# Patient Record
Sex: FEMALE | Race: OTHER | HISPANIC OR LATINO
[De-identification: names, ages, dates, MRNs, and addresses within clinical notes are randomized per-mention and may not be internally consistent; named-entity substitution may affect disease eponyms.]

---

## 2017-08-29 ENCOUNTER — LABORATORY RESULT (OUTPATIENT)
Age: 32
End: 2017-08-29

## 2017-08-30 ENCOUNTER — APPOINTMENT (OUTPATIENT)
Dept: INTERNAL MEDICINE | Facility: CLINIC | Age: 32
End: 2017-08-30
Payer: COMMERCIAL

## 2017-08-30 ENCOUNTER — OUTPATIENT (OUTPATIENT)
Dept: OUTPATIENT SERVICES | Facility: HOSPITAL | Age: 32
LOS: 1 days | End: 2017-08-30
Payer: SELF-PAY

## 2017-08-30 VITALS
HEIGHT: 61.5 IN | HEART RATE: 76 BPM | SYSTOLIC BLOOD PRESSURE: 110 MMHG | WEIGHT: 171 LBS | OXYGEN SATURATION: 98 % | DIASTOLIC BLOOD PRESSURE: 70 MMHG | BODY MASS INDEX: 31.87 KG/M2

## 2017-08-30 DIAGNOSIS — R63.5 ABNORMAL WEIGHT GAIN: ICD-10-CM

## 2017-08-30 DIAGNOSIS — I10 ESSENTIAL (PRIMARY) HYPERTENSION: ICD-10-CM

## 2017-08-30 PROCEDURE — 80053 COMPREHEN METABOLIC PANEL: CPT

## 2017-08-30 PROCEDURE — 84443 ASSAY THYROID STIM HORMONE: CPT

## 2017-08-30 PROCEDURE — 80061 LIPID PANEL: CPT

## 2017-08-30 PROCEDURE — 99203 OFFICE O/P NEW LOW 30 MIN: CPT | Mod: GC

## 2017-08-30 PROCEDURE — G0463: CPT

## 2017-08-30 PROCEDURE — 83036 HEMOGLOBIN GLYCOSYLATED A1C: CPT

## 2017-08-30 PROCEDURE — 87389 HIV-1 AG W/HIV-1&-2 AB AG IA: CPT

## 2017-08-31 LAB
ALBUMIN SERPL ELPH-MCNC: 4.6 G/DL — SIGNIFICANT CHANGE UP (ref 3.3–5)
ALP SERPL-CCNC: 76 U/L — SIGNIFICANT CHANGE UP (ref 40–120)
ALT FLD-CCNC: 43 U/L — SIGNIFICANT CHANGE UP (ref 10–45)
ANION GAP SERPL CALC-SCNC: 14 MMOL/L — SIGNIFICANT CHANGE UP (ref 5–17)
AST SERPL-CCNC: 33 U/L — SIGNIFICANT CHANGE UP (ref 10–40)
BILIRUB SERPL-MCNC: 0.4 MG/DL — SIGNIFICANT CHANGE UP (ref 0.2–1.2)
BUN SERPL-MCNC: 11 MG/DL — SIGNIFICANT CHANGE UP (ref 7–23)
C TRACH RRNA SPEC QL NAA+PROBE: SIGNIFICANT CHANGE UP
CALCIUM SERPL-MCNC: 9.8 MG/DL — SIGNIFICANT CHANGE UP (ref 8.4–10.5)
CHLORIDE SERPL-SCNC: 103 MMOL/L — SIGNIFICANT CHANGE UP (ref 96–108)
CHOLEST SERPL-MCNC: 201 MG/DL — HIGH (ref 10–199)
CO2 SERPL-SCNC: 24 MMOL/L — SIGNIFICANT CHANGE UP (ref 22–31)
CREAT SERPL-MCNC: 0.72 MG/DL — SIGNIFICANT CHANGE UP (ref 0.5–1.3)
GLUCOSE SERPL-MCNC: 93 MG/DL — SIGNIFICANT CHANGE UP (ref 70–99)
HBA1C BLD-MCNC: 5.3 % — SIGNIFICANT CHANGE UP (ref 4–5.6)
HDLC SERPL-MCNC: 56 MG/DL — SIGNIFICANT CHANGE UP (ref 40–125)
HIV 1+2 AB+HIV1 P24 AG SERPL QL IA: SIGNIFICANT CHANGE UP
LIPID PNL WITH DIRECT LDL SERPL: 112 MG/DL — SIGNIFICANT CHANGE UP
N GONORRHOEA RRNA SPEC QL NAA+PROBE: SIGNIFICANT CHANGE UP
POTASSIUM SERPL-MCNC: 3.8 MMOL/L — SIGNIFICANT CHANGE UP (ref 3.5–5.3)
POTASSIUM SERPL-SCNC: 3.8 MMOL/L — SIGNIFICANT CHANGE UP (ref 3.5–5.3)
PROT SERPL-MCNC: 7.5 G/DL — SIGNIFICANT CHANGE UP (ref 6–8.3)
SODIUM SERPL-SCNC: 141 MMOL/L — SIGNIFICANT CHANGE UP (ref 135–145)
SPECIMEN SOURCE: SIGNIFICANT CHANGE UP
T4 FREE+ TSH PNL SERPL: 2.88 UIU/ML — SIGNIFICANT CHANGE UP (ref 0.27–4.2)
TOTAL CHOLESTEROL/HDL RATIO MEASUREMENT: 3.6 RATIO — SIGNIFICANT CHANGE UP (ref 3.3–7.1)
TRIGL SERPL-MCNC: 166 MG/DL — HIGH (ref 10–149)

## 2017-09-18 ENCOUNTER — LABORATORY RESULT (OUTPATIENT)
Age: 32
End: 2017-09-18

## 2017-09-18 ENCOUNTER — RESULT REVIEW (OUTPATIENT)
Age: 32
End: 2017-09-18

## 2017-09-19 ENCOUNTER — APPOINTMENT (OUTPATIENT)
Dept: OBGYN | Facility: CLINIC | Age: 32
End: 2017-09-19
Payer: MEDICAID

## 2017-09-19 ENCOUNTER — OUTPATIENT (OUTPATIENT)
Dept: OUTPATIENT SERVICES | Facility: HOSPITAL | Age: 32
LOS: 1 days | End: 2017-09-19
Payer: SELF-PAY

## 2017-09-19 VITALS
SYSTOLIC BLOOD PRESSURE: 120 MMHG | DIASTOLIC BLOOD PRESSURE: 70 MMHG | HEIGHT: 61.5 IN | BODY MASS INDEX: 30.94 KG/M2 | WEIGHT: 166 LBS

## 2017-09-19 DIAGNOSIS — Z31.89 ENCOUNTER FOR OTHER PROCREATIVE MANAGEMENT: ICD-10-CM

## 2017-09-19 DIAGNOSIS — O34.30 MATERNAL CARE FOR CERVICAL INCOMPETENCE, UNSPECIFIED TRIMESTER: ICD-10-CM

## 2017-09-19 DIAGNOSIS — N76.0 ACUTE VAGINITIS: ICD-10-CM

## 2017-09-19 PROCEDURE — 81003 URINALYSIS AUTO W/O SCOPE: CPT | Mod: QW,NC

## 2017-09-19 PROCEDURE — 99214 OFFICE O/P EST MOD 30 MIN: CPT | Mod: NC

## 2017-09-20 ENCOUNTER — LABORATORY RESULT (OUTPATIENT)
Age: 32
End: 2017-09-20

## 2017-09-20 LAB — HPV HIGH+LOW RISK DNA PNL CVX: SIGNIFICANT CHANGE UP

## 2017-09-20 PROCEDURE — 83001 ASSAY OF GONADOTROPIN (FSH): CPT

## 2017-09-20 PROCEDURE — 87624 HPV HI-RISK TYP POOLED RSLT: CPT

## 2017-09-20 PROCEDURE — G0463: CPT

## 2017-09-20 PROCEDURE — 88175 CYTOPATH C/V AUTO FLUID REDO: CPT

## 2017-09-20 PROCEDURE — 81003 URINALYSIS AUTO W/O SCOPE: CPT

## 2017-09-21 LAB
CYTOLOGY SPEC DOC CYTO: SIGNIFICANT CHANGE UP
FSH SERPL-MCNC: 5.1 IU/L — SIGNIFICANT CHANGE UP

## 2017-10-09 ENCOUNTER — OUTPATIENT (OUTPATIENT)
Dept: OUTPATIENT SERVICES | Facility: HOSPITAL | Age: 32
LOS: 1 days | End: 2017-10-09
Payer: SELF-PAY

## 2017-10-09 ENCOUNTER — MESSAGE (OUTPATIENT)
Age: 32
End: 2017-10-09

## 2017-10-09 ENCOUNTER — LABORATORY RESULT (OUTPATIENT)
Age: 32
End: 2017-10-09

## 2017-10-09 DIAGNOSIS — O34.30 MATERNAL CARE FOR CERVICAL INCOMPETENCE, UNSPECIFIED TRIMESTER: ICD-10-CM

## 2017-10-09 DIAGNOSIS — Z31.89 ENCOUNTER FOR OTHER PROCREATIVE MANAGEMENT: ICD-10-CM

## 2017-10-09 PROCEDURE — 83001 ASSAY OF GONADOTROPIN (FSH): CPT

## 2017-10-10 LAB — FSH SERPL-MCNC: 5.4 IU/L — SIGNIFICANT CHANGE UP

## 2018-01-31 ENCOUNTER — APPOINTMENT (OUTPATIENT)
Dept: INTERNAL MEDICINE | Facility: CLINIC | Age: 33
End: 2018-01-31

## 2018-01-31 VITALS
BODY MASS INDEX: 31.13 KG/M2 | OXYGEN SATURATION: 96 % | DIASTOLIC BLOOD PRESSURE: 70 MMHG | HEART RATE: 88 BPM | SYSTOLIC BLOOD PRESSURE: 126 MMHG | WEIGHT: 167 LBS | HEIGHT: 61.5 IN

## 2018-01-31 LAB
HCG UR QL: POSITIVE
QUALITY CONTROL: YES

## 2018-02-08 ENCOUNTER — LABORATORY RESULT (OUTPATIENT)
Age: 33
End: 2018-02-08

## 2018-02-08 ENCOUNTER — APPOINTMENT (OUTPATIENT)
Dept: OBGYN | Facility: CLINIC | Age: 33
End: 2018-02-08
Payer: MEDICAID

## 2018-02-08 ENCOUNTER — OUTPATIENT (OUTPATIENT)
Dept: OUTPATIENT SERVICES | Facility: HOSPITAL | Age: 33
LOS: 1 days | End: 2018-02-08
Payer: SELF-PAY

## 2018-02-08 VITALS
HEIGHT: 61.5 IN | WEIGHT: 167.5 LBS | DIASTOLIC BLOOD PRESSURE: 58 MMHG | SYSTOLIC BLOOD PRESSURE: 100 MMHG | BODY MASS INDEX: 31.22 KG/M2

## 2018-02-08 DIAGNOSIS — Z30.41 ENCOUNTER FOR SURVEILLANCE OF CONTRACEPTIVE PILLS: ICD-10-CM

## 2018-02-08 DIAGNOSIS — N76.0 ACUTE VAGINITIS: ICD-10-CM

## 2018-02-08 DIAGNOSIS — Z87.898 PERSONAL HISTORY OF OTHER SPECIFIED CONDITIONS: ICD-10-CM

## 2018-02-08 DIAGNOSIS — Z31.89 ENCOUNTER FOR OTHER PROCREATIVE MANAGEMENT: ICD-10-CM

## 2018-02-08 LAB
HCT VFR BLD CALC: 39.3 % — SIGNIFICANT CHANGE UP (ref 34.5–45)
HGB BLD-MCNC: 13.8 G/DL — SIGNIFICANT CHANGE UP (ref 11.5–15.5)
MCHC RBC-ENTMCNC: 35.1 GM/DL — SIGNIFICANT CHANGE UP (ref 32–36)
MCHC RBC-ENTMCNC: 35.2 PG — HIGH (ref 27–34)
MCV RBC AUTO: 100.3 FL — HIGH (ref 80–100)
PLATELET # BLD AUTO: 211 K/UL — SIGNIFICANT CHANGE UP (ref 150–400)
RBC # BLD: 3.92 M/UL — SIGNIFICANT CHANGE UP (ref 3.8–5.2)
RBC # FLD: 12.8 % — SIGNIFICANT CHANGE UP (ref 10.3–14.5)
WBC # BLD: 9.75 K/UL — SIGNIFICANT CHANGE UP (ref 3.8–10.5)
WBC # FLD AUTO: 9.75 K/UL — SIGNIFICANT CHANGE UP (ref 3.8–10.5)

## 2018-02-08 PROCEDURE — 86480 TB TEST CELL IMMUN MEASURE: CPT

## 2018-02-08 PROCEDURE — 83020 HEMOGLOBIN ELECTROPHORESIS: CPT | Mod: 26

## 2018-02-08 PROCEDURE — 99214 OFFICE O/P EST MOD 30 MIN: CPT | Mod: 25,NC

## 2018-02-08 PROCEDURE — 76830 TRANSVAGINAL US NON-OB: CPT | Mod: 26,NC

## 2018-02-08 PROCEDURE — G0463: CPT | Mod: 25

## 2018-02-08 PROCEDURE — 84443 ASSAY THYROID STIM HORMONE: CPT

## 2018-02-08 PROCEDURE — 87340 HEPATITIS B SURFACE AG IA: CPT

## 2018-02-08 PROCEDURE — 83020 HEMOGLOBIN ELECTROPHORESIS: CPT

## 2018-02-08 PROCEDURE — 86787 VARICELLA-ZOSTER ANTIBODY: CPT

## 2018-02-08 PROCEDURE — 76830 TRANSVAGINAL US NON-OB: CPT

## 2018-02-08 PROCEDURE — 83655 ASSAY OF LEAD: CPT

## 2018-02-08 PROCEDURE — 86900 BLOOD TYPING SEROLOGIC ABO: CPT

## 2018-02-08 PROCEDURE — 85027 COMPLETE CBC AUTOMATED: CPT

## 2018-02-08 PROCEDURE — 86762 RUBELLA ANTIBODY: CPT

## 2018-02-08 PROCEDURE — 86850 RBC ANTIBODY SCREEN: CPT

## 2018-02-08 PROCEDURE — 86780 TREPONEMA PALLIDUM: CPT

## 2018-02-09 LAB
HBV SURFACE AG SER-ACNC: SIGNIFICANT CHANGE UP
LEAD BLD-MCNC: <1 UG/DL — SIGNIFICANT CHANGE UP (ref 0–19)
RUBV IGG SER-ACNC: 6.8 INDEX — SIGNIFICANT CHANGE UP
RUBV IGG SER-IMP: POSITIVE — SIGNIFICANT CHANGE UP
T PALLIDUM AB TITR SER: NEGATIVE — SIGNIFICANT CHANGE UP
TSH SERPL-MCNC: 2.44 UIU/ML — SIGNIFICANT CHANGE UP (ref 0.27–4.2)
VZV IGG FLD QL IA: 13.4 INDEX — SIGNIFICANT CHANGE UP
VZV IGG FLD QL IA: NEGATIVE — SIGNIFICANT CHANGE UP

## 2018-02-10 LAB
M TB TUBERC IFN-G BLD QL: 0.05 IU/ML — SIGNIFICANT CHANGE UP
M TB TUBERC IFN-G BLD QL: 0.11 IU/ML — SIGNIFICANT CHANGE UP
M TB TUBERC IFN-G BLD QL: NEGATIVE — SIGNIFICANT CHANGE UP
MITOGEN IGNF BCKGRD COR BLD-ACNC: >10 IU/ML — SIGNIFICANT CHANGE UP

## 2018-02-12 LAB
HEMOGLOBIN INTERPRETATION: SIGNIFICANT CHANGE UP
HGB A MFR BLD: 97 % — SIGNIFICANT CHANGE UP (ref 95.8–98)
HGB A2 MFR BLD: 2.8 % — SIGNIFICANT CHANGE UP (ref 2–3.2)
HGB F MFR BLD: 0.2 % — SIGNIFICANT CHANGE UP (ref 0–1)

## 2018-02-21 DIAGNOSIS — O34.30 MATERNAL CARE FOR CERVICAL INCOMPETENCE, UNSPECIFIED TRIMESTER: ICD-10-CM

## 2018-02-21 DIAGNOSIS — Z34.90 ENCOUNTER FOR SUPERVISION OF NORMAL PREGNANCY, UNSPECIFIED, UNSPECIFIED TRIMESTER: ICD-10-CM

## 2018-02-25 ENCOUNTER — LABORATORY RESULT (OUTPATIENT)
Age: 33
End: 2018-02-25

## 2018-02-26 ENCOUNTER — ASOB RESULT (OUTPATIENT)
Age: 33
End: 2018-02-26

## 2018-02-26 ENCOUNTER — OUTPATIENT (OUTPATIENT)
Dept: OUTPATIENT SERVICES | Facility: HOSPITAL | Age: 33
LOS: 1 days | End: 2018-02-26
Payer: SELF-PAY

## 2018-02-26 ENCOUNTER — APPOINTMENT (OUTPATIENT)
Dept: MATERNAL FETAL MEDICINE | Facility: CLINIC | Age: 33
End: 2018-02-26

## 2018-02-26 ENCOUNTER — APPOINTMENT (OUTPATIENT)
Dept: MATERNAL FETAL MEDICINE | Facility: CLINIC | Age: 33
End: 2018-02-26
Payer: SELF-PAY

## 2018-02-26 ENCOUNTER — NON-APPOINTMENT (OUTPATIENT)
Age: 33
End: 2018-02-26

## 2018-02-26 ENCOUNTER — APPOINTMENT (OUTPATIENT)
Dept: ANTEPARTUM | Facility: CLINIC | Age: 33
End: 2018-02-26
Payer: MEDICAID

## 2018-02-26 VITALS
DIASTOLIC BLOOD PRESSURE: 60 MMHG | HEIGHT: 61.5 IN | WEIGHT: 159 LBS | BODY MASS INDEX: 29.64 KG/M2 | SYSTOLIC BLOOD PRESSURE: 104 MMHG

## 2018-02-26 DIAGNOSIS — O09.899 SUPERVISION OF OTHER HIGH RISK PREGNANCIES, UNSPECIFIED TRIMESTER: ICD-10-CM

## 2018-02-26 PROCEDURE — 99203 OFFICE O/P NEW LOW 30 MIN: CPT | Mod: 25,GC

## 2018-02-26 PROCEDURE — 76801 OB US < 14 WKS SINGLE FETUS: CPT

## 2018-02-26 PROCEDURE — 90471 IMMUNIZATION ADMIN: CPT

## 2018-02-26 PROCEDURE — 81003 URINALYSIS AUTO W/O SCOPE: CPT

## 2018-02-26 PROCEDURE — G0463: CPT

## 2018-02-26 PROCEDURE — 84704 HCG FREE BETACHAIN TEST: CPT

## 2018-02-26 PROCEDURE — 86336 INHIBIN A: CPT

## 2018-02-26 PROCEDURE — 76801 OB US < 14 WKS SINGLE FETUS: CPT | Mod: 26

## 2018-02-26 PROCEDURE — 86900 BLOOD TYPING SEROLOGIC ABO: CPT

## 2018-02-26 PROCEDURE — 90471 IMMUNIZATION ADMIN: CPT | Mod: NC

## 2018-02-26 PROCEDURE — 90656 IIV3 VACC NO PRSV 0.5 ML IM: CPT

## 2018-02-26 PROCEDURE — 86850 RBC ANTIBODY SCREEN: CPT

## 2018-02-26 PROCEDURE — 81003 URINALYSIS AUTO W/O SCOPE: CPT | Mod: QW,NC

## 2018-02-26 PROCEDURE — 90656 IIV3 VACC NO PRSV 0.5 ML IM: CPT | Mod: NC

## 2018-02-27 DIAGNOSIS — O34.30 MATERNAL CARE FOR CERVICAL INCOMPETENCE, UNSPECIFIED TRIMESTER: ICD-10-CM

## 2018-02-27 DIAGNOSIS — Z34.90 ENCOUNTER FOR SUPERVISION OF NORMAL PREGNANCY, UNSPECIFIED, UNSPECIFIED TRIMESTER: ICD-10-CM

## 2018-03-02 ENCOUNTER — NON-APPOINTMENT (OUTPATIENT)
Age: 33
End: 2018-03-02

## 2018-03-05 ENCOUNTER — APPOINTMENT (OUTPATIENT)
Dept: MATERNAL FETAL MEDICINE | Facility: CLINIC | Age: 33
End: 2018-03-05
Payer: MEDICAID

## 2018-03-05 ENCOUNTER — OUTPATIENT (OUTPATIENT)
Dept: OUTPATIENT SERVICES | Facility: HOSPITAL | Age: 33
LOS: 1 days | End: 2018-03-05
Payer: MEDICAID

## 2018-03-05 ENCOUNTER — ASOB RESULT (OUTPATIENT)
Age: 33
End: 2018-03-05

## 2018-03-05 ENCOUNTER — APPOINTMENT (OUTPATIENT)
Dept: ANTEPARTUM | Facility: CLINIC | Age: 33
End: 2018-03-05
Payer: MEDICAID

## 2018-03-05 ENCOUNTER — NON-APPOINTMENT (OUTPATIENT)
Age: 33
End: 2018-03-05

## 2018-03-05 VITALS
HEIGHT: 61.5 IN | SYSTOLIC BLOOD PRESSURE: 110 MMHG | DIASTOLIC BLOOD PRESSURE: 60 MMHG | BODY MASS INDEX: 29.64 KG/M2 | WEIGHT: 159 LBS

## 2018-03-05 LAB
1ST TRIMESTER DATA: SIGNIFICANT CHANGE UP
ADDENDUM DOC: SIGNIFICANT CHANGE UP
AFP AMN-MCNC: SIGNIFICANT CHANGE UP
B-HCG FREE SERPL-MCNC: SIGNIFICANT CHANGE UP
BILIRUB UR QL STRIP: NEGATIVE
BILIRUB UR QL STRIP: NEGATIVE
CLINICAL BIOCHEMIST REVIEW: SIGNIFICANT CHANGE UP
CLINICAL BIOCHEMIST REVIEW: SIGNIFICANT CHANGE UP
COLLECTION METHOD: NORMAL
COLLECTION METHOD: NORMAL
DEMOGRAPHIC DATA: SIGNIFICANT CHANGE UP
GLUCOSE UR-MCNC: NEGATIVE
GLUCOSE UR-MCNC: NEGATIVE
HCG UR QL: 0.2 EU/DL
HCG UR QL: 1 EU/DL
HGB UR QL STRIP.AUTO: NEGATIVE
HGB UR QL STRIP.AUTO: NORMAL
KETONES UR-MCNC: NEGATIVE
KETONES UR-MCNC: NORMAL
LEUKOCYTE ESTERASE UR QL STRIP: NEGATIVE
LEUKOCYTE ESTERASE UR QL STRIP: NORMAL
NITRITE UR QL STRIP: NEGATIVE
NITRITE UR QL STRIP: NEGATIVE
NT: SIGNIFICANT CHANGE UP
PAPP-A SERPL-ACNC: SIGNIFICANT CHANGE UP
PH UR STRIP: 5.5
PH UR STRIP: 5.5
PROT UR STRIP-MCNC: NEGATIVE
PROT UR STRIP-MCNC: NEGATIVE
SCREEN-FOOTER: SIGNIFICANT CHANGE UP
SCREEN-RECOMMENDATIONS: SIGNIFICANT CHANGE UP
SP GR UR STRIP: 1.02
SP GR UR STRIP: <=1.005

## 2018-03-05 PROCEDURE — 76813 OB US NUCHAL MEAS 1 GEST: CPT

## 2018-03-05 PROCEDURE — 76813 OB US NUCHAL MEAS 1 GEST: CPT | Mod: 26

## 2018-03-05 PROCEDURE — G0463: CPT

## 2018-03-05 PROCEDURE — 81003 URINALYSIS AUTO W/O SCOPE: CPT

## 2018-03-05 PROCEDURE — 99213 OFFICE O/P EST LOW 20 MIN: CPT | Mod: 25,GC

## 2018-03-05 PROCEDURE — 81003 URINALYSIS AUTO W/O SCOPE: CPT | Mod: QW,NC

## 2018-03-07 DIAGNOSIS — O09.291 SUPERVISION OF PREGNANCY WITH OTHER POOR REPRODUCTIVE OR OBSTETRIC HISTORY, FIRST TRIMESTER: ICD-10-CM

## 2018-03-07 DIAGNOSIS — O36.80X0 PREGNANCY WITH INCONCLUSIVE FETAL VIABILITY, NOT APPLICABLE OR UNSPECIFIED: ICD-10-CM

## 2018-03-07 DIAGNOSIS — Z23 ENCOUNTER FOR IMMUNIZATION: ICD-10-CM

## 2018-03-07 DIAGNOSIS — Z3A.11 11 WEEKS GESTATION OF PREGNANCY: ICD-10-CM

## 2018-03-13 DIAGNOSIS — O09.291 SUPERVISION OF PREGNANCY WITH OTHER POOR REPRODUCTIVE OR OBSTETRIC HISTORY, FIRST TRIMESTER: ICD-10-CM

## 2018-03-13 DIAGNOSIS — O09.899 SUPERVISION OF OTHER HIGH RISK PREGNANCIES, UNSPECIFIED TRIMESTER: ICD-10-CM

## 2018-03-13 DIAGNOSIS — Z3A.11 11 WEEKS GESTATION OF PREGNANCY: ICD-10-CM

## 2018-03-15 DIAGNOSIS — N83.292 OTHER OVARIAN CYST, LEFT SIDE: ICD-10-CM

## 2018-03-15 DIAGNOSIS — Z36.3 ENCOUNTER FOR ANTENATAL SCREENING FOR MALFORMATIONS: ICD-10-CM

## 2018-03-15 DIAGNOSIS — Z3A.12 12 WEEKS GESTATION OF PREGNANCY: ICD-10-CM

## 2018-03-25 ENCOUNTER — LABORATORY RESULT (OUTPATIENT)
Age: 33
End: 2018-03-25

## 2018-03-26 ENCOUNTER — NON-APPOINTMENT (OUTPATIENT)
Age: 33
End: 2018-03-26

## 2018-03-26 ENCOUNTER — OUTPATIENT (OUTPATIENT)
Dept: OUTPATIENT SERVICES | Facility: HOSPITAL | Age: 33
LOS: 1 days | End: 2018-03-26
Payer: MEDICAID

## 2018-03-26 ENCOUNTER — APPOINTMENT (OUTPATIENT)
Dept: MATERNAL FETAL MEDICINE | Facility: CLINIC | Age: 33
End: 2018-03-26
Payer: MEDICAID

## 2018-03-26 ENCOUNTER — APPOINTMENT (OUTPATIENT)
Dept: ANTEPARTUM | Facility: CLINIC | Age: 33
End: 2018-03-26
Payer: MEDICAID

## 2018-03-26 ENCOUNTER — ASOB RESULT (OUTPATIENT)
Age: 33
End: 2018-03-26

## 2018-03-26 VITALS
WEIGHT: 158 LBS | HEIGHT: 61.5 IN | BODY MASS INDEX: 29.45 KG/M2 | DIASTOLIC BLOOD PRESSURE: 64 MMHG | SYSTOLIC BLOOD PRESSURE: 110 MMHG

## 2018-03-26 DIAGNOSIS — O34.30 MATERNAL CARE FOR CERVICAL INCOMPETENCE, UNSPECIFIED TRIMESTER: ICD-10-CM

## 2018-03-26 DIAGNOSIS — O09.899 SUPERVISION OF OTHER HIGH RISK PREGNANCIES, UNSPECIFIED TRIMESTER: ICD-10-CM

## 2018-03-26 LAB
BILIRUB UR QL STRIP: NEGATIVE
COLLECTION METHOD: NORMAL
GLUCOSE UR-MCNC: NEGATIVE
HCG UR QL: 1 EU/DL
HGB UR QL STRIP.AUTO: NEGATIVE
KETONES UR-MCNC: NORMAL
LEUKOCYTE ESTERASE UR QL STRIP: NEGATIVE
NITRITE UR QL STRIP: NEGATIVE
PH UR STRIP: 6
PROT UR STRIP-MCNC: NEGATIVE
SP GR UR STRIP: 1.01

## 2018-03-26 PROCEDURE — 99213 OFFICE O/P EST LOW 20 MIN: CPT

## 2018-03-26 PROCEDURE — 76817 TRANSVAGINAL US OBSTETRIC: CPT

## 2018-03-26 PROCEDURE — 76805 OB US >/= 14 WKS SNGL FETUS: CPT | Mod: 26

## 2018-03-26 PROCEDURE — 76805 OB US >/= 14 WKS SNGL FETUS: CPT

## 2018-03-26 PROCEDURE — 86336 INHIBIN A: CPT

## 2018-03-26 PROCEDURE — 81003 URINALYSIS AUTO W/O SCOPE: CPT

## 2018-03-26 PROCEDURE — 76817 TRANSVAGINAL US OBSTETRIC: CPT | Mod: 26,59

## 2018-03-26 PROCEDURE — 82677 ASSAY OF ESTRIOL: CPT

## 2018-03-26 PROCEDURE — 81003 URINALYSIS AUTO W/O SCOPE: CPT | Mod: QW,NC

## 2018-03-26 PROCEDURE — G0463: CPT

## 2018-03-26 PROCEDURE — 82105 ALPHA-FETOPROTEIN SERUM: CPT

## 2018-03-26 PROCEDURE — 84704 HCG FREE BETACHAIN TEST: CPT

## 2018-03-28 DIAGNOSIS — Z36.3 ENCOUNTER FOR ANTENATAL SCREENING FOR MALFORMATIONS: ICD-10-CM

## 2018-03-28 DIAGNOSIS — O09.292 SUPERVISION OF PREGNANCY WITH OTHER POOR REPRODUCTIVE OR OBSTETRIC HISTORY, SECOND TRIMESTER: ICD-10-CM

## 2018-03-28 DIAGNOSIS — N83.292 OTHER OVARIAN CYST, LEFT SIDE: ICD-10-CM

## 2018-03-29 LAB
1ST TRIMESTER DATA: SIGNIFICANT CHANGE UP
2ND TRIMESTER DATA: SIGNIFICANT CHANGE UP
AFP AMN-MCNC: SIGNIFICANT CHANGE UP
AFP SERPL-ACNC: SIGNIFICANT CHANGE UP
B-HCG FREE SERPL-MCNC: SIGNIFICANT CHANGE UP
B-HCG FREE SERPL-MCNC: SIGNIFICANT CHANGE UP
CLINICAL BIOCHEMIST REVIEW: SIGNIFICANT CHANGE UP
DEMOGRAPHIC DATA: SIGNIFICANT CHANGE UP
INHIBIN A SERPL-MCNC: SIGNIFICANT CHANGE UP
NT: SIGNIFICANT CHANGE UP
PAPP-A SERPL-ACNC: SIGNIFICANT CHANGE UP
SCREEN-FOOTER: SIGNIFICANT CHANGE UP
U ESTRIOL SERPL-SCNC: SIGNIFICANT CHANGE UP

## 2018-03-30 ENCOUNTER — NON-APPOINTMENT (OUTPATIENT)
Age: 33
End: 2018-03-30

## 2018-04-09 ENCOUNTER — APPOINTMENT (OUTPATIENT)
Dept: MATERNAL FETAL MEDICINE | Facility: CLINIC | Age: 33
End: 2018-04-09
Payer: MEDICAID

## 2018-04-09 ENCOUNTER — ASOB RESULT (OUTPATIENT)
Age: 33
End: 2018-04-09

## 2018-04-09 ENCOUNTER — NON-APPOINTMENT (OUTPATIENT)
Age: 33
End: 2018-04-09

## 2018-04-09 ENCOUNTER — APPOINTMENT (OUTPATIENT)
Dept: ANTEPARTUM | Facility: CLINIC | Age: 33
End: 2018-04-09
Payer: MEDICAID

## 2018-04-09 ENCOUNTER — OUTPATIENT (OUTPATIENT)
Dept: OUTPATIENT SERVICES | Facility: HOSPITAL | Age: 33
LOS: 1 days | End: 2018-04-09
Payer: MEDICAID

## 2018-04-09 VITALS
WEIGHT: 162.3 LBS | HEIGHT: 61.5 IN | SYSTOLIC BLOOD PRESSURE: 90 MMHG | DIASTOLIC BLOOD PRESSURE: 60 MMHG | BODY MASS INDEX: 30.25 KG/M2

## 2018-04-09 LAB
BILIRUB UR QL STRIP: NEGATIVE
COLLECTION METHOD: NORMAL
GLUCOSE UR-MCNC: NEGATIVE
HCG UR QL: 1 EU/DL
HGB UR QL STRIP.AUTO: NEGATIVE
KETONES UR-MCNC: NEGATIVE
LEUKOCYTE ESTERASE UR QL STRIP: NEGATIVE
NITRITE UR QL STRIP: NEGATIVE
PH UR STRIP: 7
PROT UR STRIP-MCNC: NORMAL
SP GR UR STRIP: 1.02

## 2018-04-09 PROCEDURE — 76817 TRANSVAGINAL US OBSTETRIC: CPT

## 2018-04-09 PROCEDURE — G0463: CPT

## 2018-04-09 PROCEDURE — 99213 OFFICE O/P EST LOW 20 MIN: CPT | Mod: 25

## 2018-04-09 PROCEDURE — 76815 OB US LIMITED FETUS(S): CPT

## 2018-04-09 PROCEDURE — 76817 TRANSVAGINAL US OBSTETRIC: CPT | Mod: 26

## 2018-04-09 PROCEDURE — 76815 OB US LIMITED FETUS(S): CPT | Mod: 26

## 2018-04-09 PROCEDURE — 81003 URINALYSIS AUTO W/O SCOPE: CPT

## 2018-04-09 PROCEDURE — 81003 URINALYSIS AUTO W/O SCOPE: CPT | Mod: QW,NC

## 2018-04-16 ENCOUNTER — OTHER (OUTPATIENT)
Age: 33
End: 2018-04-16

## 2018-04-16 ENCOUNTER — OUTPATIENT (OUTPATIENT)
Dept: OUTPATIENT SERVICES | Facility: HOSPITAL | Age: 33
LOS: 1 days | End: 2018-04-16
Payer: MEDICAID

## 2018-04-16 ENCOUNTER — MED ADMIN CHARGE (OUTPATIENT)
Age: 33
End: 2018-04-16

## 2018-04-16 ENCOUNTER — APPOINTMENT (OUTPATIENT)
Dept: MATERNAL FETAL MEDICINE | Facility: CLINIC | Age: 33
End: 2018-04-16
Payer: MEDICAID

## 2018-04-16 DIAGNOSIS — O09.90 SUPERVISION OF HIGH RISK PREGNANCY, UNSPECIFIED, UNSPECIFIED TRIMESTER: ICD-10-CM

## 2018-04-16 DIAGNOSIS — O34.30 MATERNAL CARE FOR CERVICAL INCOMPETENCE, UNSPECIFIED TRIMESTER: ICD-10-CM

## 2018-04-16 PROCEDURE — 81003 URINALYSIS AUTO W/O SCOPE: CPT

## 2018-04-16 PROCEDURE — G0463: CPT

## 2018-04-16 PROCEDURE — 81003 URINALYSIS AUTO W/O SCOPE: CPT | Mod: QW,NC

## 2018-04-16 PROCEDURE — 99213 OFFICE O/P EST LOW 20 MIN: CPT | Mod: 25,GC

## 2018-04-23 ENCOUNTER — APPOINTMENT (OUTPATIENT)
Dept: MATERNAL FETAL MEDICINE | Facility: CLINIC | Age: 33
End: 2018-04-23

## 2018-04-23 VITALS — SYSTOLIC BLOOD PRESSURE: 100 MMHG | DIASTOLIC BLOOD PRESSURE: 60 MMHG

## 2018-04-24 ENCOUNTER — MED ADMIN CHARGE (OUTPATIENT)
Age: 33
End: 2018-04-24

## 2018-04-24 ENCOUNTER — APPOINTMENT (OUTPATIENT)
Dept: MATERNAL FETAL MEDICINE | Facility: CLINIC | Age: 33
End: 2018-04-24
Payer: MEDICAID

## 2018-04-24 ENCOUNTER — OTHER (OUTPATIENT)
Age: 33
End: 2018-04-24

## 2018-04-24 ENCOUNTER — OUTPATIENT (OUTPATIENT)
Dept: OUTPATIENT SERVICES | Facility: HOSPITAL | Age: 33
LOS: 1 days | End: 2018-04-24
Payer: MEDICAID

## 2018-04-24 VITALS — WEIGHT: 163 LBS | DIASTOLIC BLOOD PRESSURE: 60 MMHG | SYSTOLIC BLOOD PRESSURE: 92 MMHG | BODY MASS INDEX: 30.3 KG/M2

## 2018-04-24 DIAGNOSIS — O47.00 FALSE LABOR BEFORE 37 COMPLETED WEEKS OF GESTATION, UNSPECIFIED TRIMESTER: ICD-10-CM

## 2018-04-24 PROCEDURE — G0463: CPT

## 2018-04-24 PROCEDURE — 99211 OFF/OP EST MAY X REQ PHY/QHP: CPT

## 2018-04-30 ENCOUNTER — OUTPATIENT (OUTPATIENT)
Dept: OUTPATIENT SERVICES | Facility: HOSPITAL | Age: 33
LOS: 1 days | End: 2018-04-30
Payer: MEDICAID

## 2018-04-30 ENCOUNTER — TRANSCRIPTION ENCOUNTER (OUTPATIENT)
Age: 33
End: 2018-04-30

## 2018-04-30 ENCOUNTER — INPATIENT (INPATIENT)
Facility: HOSPITAL | Age: 33
LOS: 3 days | Discharge: HOME | DRG: 782 | End: 2018-05-04
Attending: OBSTETRICS & GYNECOLOGY | Admitting: OBSTETRICS & GYNECOLOGY
Payer: MEDICAID

## 2018-04-30 ENCOUNTER — ASOB RESULT (OUTPATIENT)
Age: 33
End: 2018-04-30

## 2018-04-30 ENCOUNTER — APPOINTMENT (OUTPATIENT)
Dept: ANTEPARTUM | Facility: CLINIC | Age: 33
End: 2018-04-30
Payer: MEDICAID

## 2018-04-30 ENCOUNTER — APPOINTMENT (OUTPATIENT)
Dept: MATERNAL FETAL MEDICINE | Facility: CLINIC | Age: 33
End: 2018-04-30

## 2018-04-30 VITALS
WEIGHT: 163 LBS | DIASTOLIC BLOOD PRESSURE: 60 MMHG | SYSTOLIC BLOOD PRESSURE: 106 MMHG | BODY MASS INDEX: 30.38 KG/M2 | HEIGHT: 61.5 IN

## 2018-04-30 VITALS — WEIGHT: 169.76 LBS

## 2018-04-30 DIAGNOSIS — O09.899 SUPERVISION OF OTHER HIGH RISK PREGNANCIES, UNSPECIFIED TRIMESTER: ICD-10-CM

## 2018-04-30 DIAGNOSIS — O26.872 CERVICAL SHORTENING, SECOND TRIMESTER: ICD-10-CM

## 2018-04-30 DIAGNOSIS — Z3A.00 WEEKS OF GESTATION OF PREGNANCY NOT SPECIFIED: ICD-10-CM

## 2018-04-30 DIAGNOSIS — O26.899 OTHER SPECIFIED PREGNANCY RELATED CONDITIONS, UNSPECIFIED TRIMESTER: ICD-10-CM

## 2018-04-30 DIAGNOSIS — Z3A.20 20 WEEKS GESTATION OF PREGNANCY: ICD-10-CM

## 2018-04-30 DIAGNOSIS — Z34.80 ENCOUNTER FOR SUPERVISION OF OTHER NORMAL PREGNANCY, UNSPECIFIED TRIMESTER: ICD-10-CM

## 2018-04-30 DIAGNOSIS — O35.8XX0 MATERNAL CARE FOR OTHER (SUSPECTED) FETAL ABNORMALITY AND DAMAGE, NOT APPLICABLE OR UNSPECIFIED: ICD-10-CM

## 2018-04-30 DIAGNOSIS — O09.292 SUPERVISION OF PREGNANCY WITH OTHER POOR REPRODUCTIVE OR OBSTETRIC HISTORY, SECOND TRIMESTER: ICD-10-CM

## 2018-04-30 LAB
AMNISURE ROM (RUPTURE OF MEMBRANES): NEGATIVE — SIGNIFICANT CHANGE UP
APPEARANCE UR: CLEAR — SIGNIFICANT CHANGE UP
BACTERIA # UR AUTO: ABNORMAL /HPF
BASOPHILS # BLD AUTO: 0 K/UL — SIGNIFICANT CHANGE UP (ref 0–0.2)
BASOPHILS NFR BLD AUTO: 0.4 % — SIGNIFICANT CHANGE UP (ref 0–2)
BILIRUB UR-MCNC: NEGATIVE — SIGNIFICANT CHANGE UP
BLD GP AB SCN SERPL QL: NEGATIVE — SIGNIFICANT CHANGE UP
COLOR SPEC: YELLOW — SIGNIFICANT CHANGE UP
DIFF PNL FLD: ABNORMAL
EOSINOPHIL # BLD AUTO: 0.1 K/UL — SIGNIFICANT CHANGE UP (ref 0–0.5)
EOSINOPHIL NFR BLD AUTO: 1.2 % — SIGNIFICANT CHANGE UP (ref 0–6)
EPI CELLS # UR: SIGNIFICANT CHANGE UP /HPF
GLUCOSE UR QL: NEGATIVE — SIGNIFICANT CHANGE UP
HCT VFR BLD CALC: 40.2 % — SIGNIFICANT CHANGE UP (ref 34.5–45)
HGB BLD-MCNC: 14.2 G/DL — SIGNIFICANT CHANGE UP (ref 11.5–15.5)
KETONES UR-MCNC: NEGATIVE — SIGNIFICANT CHANGE UP
LEUKOCYTE ESTERASE UR-ACNC: NEGATIVE — SIGNIFICANT CHANGE UP
LYMPHOCYTES # BLD AUTO: 27.8 % — SIGNIFICANT CHANGE UP (ref 13–44)
LYMPHOCYTES # BLD AUTO: 3.1 K/UL — SIGNIFICANT CHANGE UP (ref 1–3.3)
MCHC RBC-ENTMCNC: 35.2 PG — HIGH (ref 27–34)
MCHC RBC-ENTMCNC: 35.3 GM/DL — SIGNIFICANT CHANGE UP (ref 32–36)
MCV RBC AUTO: 99.6 FL — SIGNIFICANT CHANGE UP (ref 80–100)
MONOCYTES # BLD AUTO: 0.7 K/UL — SIGNIFICANT CHANGE UP (ref 0–0.9)
MONOCYTES NFR BLD AUTO: 6.1 % — SIGNIFICANT CHANGE UP (ref 2–14)
NEUTROPHILS # BLD AUTO: 7.1 K/UL — SIGNIFICANT CHANGE UP (ref 1.8–7.4)
NEUTROPHILS NFR BLD AUTO: 64.5 % — SIGNIFICANT CHANGE UP (ref 43–77)
NITRITE UR-MCNC: NEGATIVE — SIGNIFICANT CHANGE UP
PH UR: 7 — SIGNIFICANT CHANGE UP (ref 5–8)
PLATELET # BLD AUTO: 192 K/UL — SIGNIFICANT CHANGE UP (ref 150–400)
PROT UR-MCNC: NEGATIVE — SIGNIFICANT CHANGE UP
RBC # BLD: 4.03 M/UL — SIGNIFICANT CHANGE UP (ref 3.8–5.2)
RBC # FLD: 12.1 % — SIGNIFICANT CHANGE UP (ref 10.3–14.5)
RBC CASTS # UR COMP ASSIST: SIGNIFICANT CHANGE UP /HPF (ref 0–2)
RH IG SCN BLD-IMP: POSITIVE — SIGNIFICANT CHANGE UP
SP GR SPEC: 1.01 — SIGNIFICANT CHANGE UP (ref 1.01–1.02)
UROBILINOGEN FLD QL: NEGATIVE — SIGNIFICANT CHANGE UP
WBC # BLD: 11.1 K/UL — HIGH (ref 3.8–10.5)
WBC # FLD AUTO: 11.1 K/UL — HIGH (ref 3.8–10.5)
WBC UR QL: SIGNIFICANT CHANGE UP /HPF (ref 0–5)

## 2018-04-30 PROCEDURE — 76811 OB US DETAILED SNGL FETUS: CPT

## 2018-04-30 PROCEDURE — 76811 OB US DETAILED SNGL FETUS: CPT | Mod: 26

## 2018-04-30 RX ORDER — DOCUSATE SODIUM 100 MG
100 CAPSULE ORAL THREE TIMES A DAY
Qty: 0 | Refills: 0 | Status: DISCONTINUED | OUTPATIENT
Start: 2018-04-30 | End: 2018-05-04

## 2018-05-01 ENCOUNTER — APPOINTMENT (OUTPATIENT)
Dept: ANTEPARTUM | Facility: CLINIC | Age: 33
End: 2018-05-01

## 2018-05-01 ENCOUNTER — ASOB RESULT (OUTPATIENT)
Age: 33
End: 2018-05-01

## 2018-05-01 ENCOUNTER — LABORATORY RESULT (OUTPATIENT)
Age: 33
End: 2018-05-01

## 2018-05-01 LAB
B PERT IGG+IGM PNL SER: ABNORMAL
BASOPHILS NFR AMN MANUAL: 14 /UL — HIGH (ref 0–5)
COLOR FLD: YELLOW — SIGNIFICANT CHANGE UP
CULTURE RESULTS: SIGNIFICANT CHANGE UP
FLUID INTAKE SUBSTANCE CLASS: SIGNIFICANT CHANGE UP
FLUID SEGMENTED GRANULOCYTES: 48 % — SIGNIFICANT CHANGE UP
GLUCOSE FLD-MCNC: 28 MG/DL — SIGNIFICANT CHANGE UP
GRAM STN FLD: SIGNIFICANT CHANGE UP
GRAM STN FLD: SIGNIFICANT CHANGE UP
LDH SERPL L TO P-CCNC: 103 U/L — SIGNIFICANT CHANGE UP
LYMPHOCYTES # FLD: 16 % — SIGNIFICANT CHANGE UP
MONOS+MACROS # FLD: 36 % — SIGNIFICANT CHANGE UP
RCV VOL RI: 98 /UL — HIGH (ref 0–5)
SPECIMEN SOURCE: SIGNIFICANT CHANGE UP
SPECIMEN SOURCE: SIGNIFICANT CHANGE UP
T PALLIDUM AB TITR SER: NEGATIVE — SIGNIFICANT CHANGE UP
TUBE TYPE: SIGNIFICANT CHANGE UP

## 2018-05-01 PROCEDURE — 88291 CYTO/MOLECULAR REPORT: CPT

## 2018-05-01 RX ORDER — METRONIDAZOLE 500 MG
500 TABLET ORAL ONCE
Qty: 0 | Refills: 0 | Status: COMPLETED | OUTPATIENT
Start: 2018-05-01 | End: 2018-05-01

## 2018-05-01 RX ORDER — METRONIDAZOLE 500 MG
500 TABLET ORAL EVERY 8 HOURS
Qty: 0 | Refills: 0 | Status: DISCONTINUED | OUTPATIENT
Start: 2018-05-01 | End: 2018-05-04

## 2018-05-01 RX ORDER — INDOMETHACIN 50 MG
50 CAPSULE ORAL ONCE
Qty: 0 | Refills: 0 | Status: COMPLETED | OUTPATIENT
Start: 2018-05-01 | End: 2018-05-01

## 2018-05-01 RX ORDER — AMPICILLIN TRIHYDRATE 250 MG
2000 CAPSULE ORAL ONCE
Qty: 0 | Refills: 0 | Status: COMPLETED | OUTPATIENT
Start: 2018-05-01 | End: 2018-05-01

## 2018-05-01 RX ORDER — METRONIDAZOLE 500 MG
TABLET ORAL
Qty: 0 | Refills: 0 | Status: DISCONTINUED | OUTPATIENT
Start: 2018-05-01 | End: 2018-05-04

## 2018-05-01 RX ORDER — CEFAZOLIN SODIUM 1 G
VIAL (EA) INJECTION
Qty: 0 | Refills: 0 | Status: DISCONTINUED | OUTPATIENT
Start: 2018-05-01 | End: 2018-05-04

## 2018-05-01 RX ORDER — CEFAZOLIN SODIUM 1 G
2000 VIAL (EA) INJECTION ONCE
Qty: 0 | Refills: 0 | Status: COMPLETED | OUTPATIENT
Start: 2018-05-01 | End: 2018-05-01

## 2018-05-01 RX ORDER — CEFAZOLIN SODIUM 1 G
2000 VIAL (EA) INJECTION EVERY 8 HOURS
Qty: 0 | Refills: 0 | Status: DISCONTINUED | OUTPATIENT
Start: 2018-05-01 | End: 2018-05-04

## 2018-05-01 RX ORDER — INDOMETHACIN 50 MG
25 CAPSULE ORAL EVERY 6 HOURS
Qty: 0 | Refills: 0 | Status: COMPLETED | OUTPATIENT
Start: 2018-05-01 | End: 2018-05-03

## 2018-05-01 RX ADMIN — Medication 100 MILLIGRAM(S): at 15:30

## 2018-05-01 RX ADMIN — Medication 216 MILLIGRAM(S): at 13:15

## 2018-05-01 RX ADMIN — Medication 100 MILLIGRAM(S): at 22:41

## 2018-05-01 RX ADMIN — Medication 25 MILLIGRAM(S): at 20:23

## 2018-05-01 RX ADMIN — Medication 100 MILLIGRAM(S): at 22:42

## 2018-05-01 RX ADMIN — Medication 50 MILLIGRAM(S): at 14:51

## 2018-05-01 RX ADMIN — Medication 100 MILLIGRAM(S): at 22:05

## 2018-05-01 RX ADMIN — Medication 100 MILLIGRAM(S): at 14:41

## 2018-05-02 ENCOUNTER — TRANSCRIPTION ENCOUNTER (OUTPATIENT)
Age: 33
End: 2018-05-02

## 2018-05-02 ENCOUNTER — ASOB RESULT (OUTPATIENT)
Age: 33
End: 2018-05-02

## 2018-05-02 ENCOUNTER — APPOINTMENT (OUTPATIENT)
Dept: ANTEPARTUM | Facility: CLINIC | Age: 33
End: 2018-05-02

## 2018-05-02 LAB
BASOPHILS # BLD AUTO: 0.1 K/UL — SIGNIFICANT CHANGE UP (ref 0–0.2)
BASOPHILS NFR BLD AUTO: 0.6 % — SIGNIFICANT CHANGE UP (ref 0–2)
EOSINOPHIL # BLD AUTO: 0.2 K/UL — SIGNIFICANT CHANGE UP (ref 0–0.5)
EOSINOPHIL NFR BLD AUTO: 2 % — SIGNIFICANT CHANGE UP (ref 0–6)
HCT VFR BLD CALC: 32.8 % — LOW (ref 34.5–45)
HCT VFR BLD CALC: 33.2 % — LOW (ref 34.5–45)
HGB BLD-MCNC: 12 G/DL — SIGNIFICANT CHANGE UP (ref 11.5–15.5)
HGB BLD-MCNC: 12 G/DL — SIGNIFICANT CHANGE UP (ref 11.5–15.5)
LYMPHOCYTES # BLD AUTO: 19.7 % — SIGNIFICANT CHANGE UP (ref 13–44)
LYMPHOCYTES # BLD AUTO: 2 K/UL — SIGNIFICANT CHANGE UP (ref 1–3.3)
MCHC RBC-ENTMCNC: 36.1 GM/DL — HIGH (ref 32–36)
MCHC RBC-ENTMCNC: 36.3 PG — HIGH (ref 27–34)
MCHC RBC-ENTMCNC: 36.6 GM/DL — HIGH (ref 32–36)
MCHC RBC-ENTMCNC: 36.8 PG — HIGH (ref 27–34)
MCV RBC AUTO: 100 FL — SIGNIFICANT CHANGE UP (ref 80–100)
MCV RBC AUTO: 100 FL — SIGNIFICANT CHANGE UP (ref 80–100)
MONOCYTES # BLD AUTO: 0.7 K/UL — SIGNIFICANT CHANGE UP (ref 0–0.9)
MONOCYTES NFR BLD AUTO: 6.6 % — SIGNIFICANT CHANGE UP (ref 2–14)
NEUTROPHILS # BLD AUTO: 7.2 K/UL — SIGNIFICANT CHANGE UP (ref 1.8–7.4)
NEUTROPHILS NFR BLD AUTO: 71.1 % — SIGNIFICANT CHANGE UP (ref 43–77)
PLATELET # BLD AUTO: 165 K/UL — SIGNIFICANT CHANGE UP (ref 150–400)
PLATELET # BLD AUTO: 175 K/UL — SIGNIFICANT CHANGE UP (ref 150–400)
RBC # BLD: 3.27 M/UL — LOW (ref 3.8–5.2)
RBC # BLD: 3.3 M/UL — LOW (ref 3.8–5.2)
RBC # FLD: 12.2 % — SIGNIFICANT CHANGE UP (ref 10.3–14.5)
RBC # FLD: 12.3 % — SIGNIFICANT CHANGE UP (ref 10.3–14.5)
WBC # BLD: 10.1 K/UL — SIGNIFICANT CHANGE UP (ref 3.8–10.5)
WBC # BLD: 10.4 K/UL — SIGNIFICANT CHANGE UP (ref 3.8–10.5)
WBC # FLD AUTO: 10.1 K/UL — SIGNIFICANT CHANGE UP (ref 3.8–10.5)
WBC # FLD AUTO: 10.4 K/UL — SIGNIFICANT CHANGE UP (ref 3.8–10.5)

## 2018-05-02 PROCEDURE — 76816 OB US FOLLOW-UP PER FETUS: CPT | Mod: 26

## 2018-05-02 RX ORDER — SODIUM CHLORIDE 9 MG/ML
1000 INJECTION, SOLUTION INTRAVENOUS
Qty: 0 | Refills: 0 | Status: DISCONTINUED | OUTPATIENT
Start: 2018-05-02 | End: 2018-05-04

## 2018-05-02 RX ADMIN — Medication 100 MILLIGRAM(S): at 08:39

## 2018-05-02 RX ADMIN — Medication 100 MILLIGRAM(S): at 06:19

## 2018-05-02 RX ADMIN — Medication 1 TABLET(S): at 09:02

## 2018-05-02 RX ADMIN — Medication 100 MILLIGRAM(S): at 16:42

## 2018-05-02 RX ADMIN — Medication 25 MILLIGRAM(S): at 15:41

## 2018-05-02 RX ADMIN — Medication 25 MILLIGRAM(S): at 09:01

## 2018-05-02 RX ADMIN — Medication 100 MILLIGRAM(S): at 22:46

## 2018-05-02 RX ADMIN — Medication 100 MILLIGRAM(S): at 14:40

## 2018-05-02 RX ADMIN — SODIUM CHLORIDE 125 MILLILITER(S): 9 INJECTION, SOLUTION INTRAVENOUS at 14:41

## 2018-05-02 RX ADMIN — Medication 25 MILLIGRAM(S): at 14:42

## 2018-05-02 RX ADMIN — Medication 25 MILLIGRAM(S): at 20:35

## 2018-05-02 RX ADMIN — Medication 25 MILLIGRAM(S): at 03:14

## 2018-05-02 RX ADMIN — Medication 25 MILLIGRAM(S): at 09:48

## 2018-05-02 NOTE — DISCHARGE NOTE ANTEPARTUM - CARE PROVIDER_API CALL
Sonu Golden (YENY), Obstetrics and Gynecology  [address] [place] [address]  Phone: [phone]  Fax: [phone]

## 2018-05-02 NOTE — DISCHARGE NOTE ANTEPARTUM - PATIENT PORTAL LINK FT
You can access the [facility], offered by [facility], by registering with the following website: [email or web address]

## 2018-05-02 NOTE — DISCHARGE NOTE ANTEPARTUM - HOSPITAL COURSE
Pt is a 33 yo  a/w Cervical dilation 4cm @ 20.0 wks, s/p amnio/amnioreduction (250cc) (). Pt has a h/o  cervical dilation 2 20 2ks , rescue cerclage placed, membranes past stitch on routine f/u. s/p KCL and IOL @ 23 weeks with . Pt was extensively counselled and wanted all possible intervention. Pt was taken to the OR and underwent membrane reduction and cervical cerclage (). Pt was kept on IV antibiotics for 48 hours and Indocin for 48 hours. Pt was discharged in stable condition with close outpatient follow up at high risk OB clinic. Pt is a 31 yo  a/w Cervical dilation 4cm @ 20.0 wks, s/p amnio/amnioreduction (250cc) (). Pt has a h/o  cervical dilation 2 20 2ks , rescue cerclage placed, membranes past stitch on routine f/u. s/p KCL and IOL @ 23 weeks with . Pt was extensively counselled and wanted all possible intervention. Pt was taken to the OR and underwent membrane reduction and cervical cerclage (). Pt was kept on IV antibiotics for 48 hours and Indocin for 48 hours. Pt was discharged on HD#5 in stable condition with close outpatient follow up at high [facility] OB clinic. Pt is a 33 yo  a/w Cervical dilation 4cm @ 20.0 wks, s/p amnio/amnioreduction (250cc) (). Pt has a h/o  cervical dilation 2 20 2ks , rescue cerclage placed, membranes past stitch on routine f/u. s/p KCL and IOL @ 23 weeks with . Pt was extensively counselled and wanted all possible intervention. Pt was taken to the OR and underwent membrane reduction and cervical cerclage (). Pt was kept on IV antibiotics for 48 hours and Indocin for 48 hours. Pt was discharged on HD#5 in stable condition with close outpatient follow up at high risk OB clinic on  @ 8AM.

## 2018-05-02 NOTE — DISCHARGE NOTE ANTEPARTUM - PLAN OF CARE
continued pregnancy close antepartum testing. HRC visit weekly, sonos weekly  Follow up in one week in [facility] ob clinic for a visit close antepartum monitoring  Follow up in one week (FRIDAY 5/11) in [facility] ob clinic for a visit @ 8AM

## 2018-05-02 NOTE — DISCHARGE NOTE ANTEPARTUM - CARE PLAN
Principal Discharge DX:	Cervical insufficiency during pregnancy in second trimester, antepartum  Goal:	continued pregnancy  Assessment and plan of treatment:	close antepartum testing. HRC visit weekly, sonos weekly  Follow up in one week in [facility] ob clinic for a visit Principal Discharge DX:	Cervical insufficiency during pregnancy in second trimester, antepartum  Goal:	continued pregnancy  Assessment and plan of treatment:	close antepartum monitoring  Follow up in one week (FRIDAY 5/11) in [facility] risk ob clinic for a visit @ 8AM

## 2018-05-03 ENCOUNTER — APPOINTMENT (OUTPATIENT)
Dept: ANTEPARTUM | Facility: CLINIC | Age: 33
End: 2018-05-03

## 2018-05-03 LAB
HCT VFR BLD CALC: 33.9 % — LOW (ref 34.5–45)
HGB BLD-MCNC: 11.7 G/DL — SIGNIFICANT CHANGE UP (ref 11.5–15.5)
MCHC RBC-ENTMCNC: 34.4 GM/DL — SIGNIFICANT CHANGE UP (ref 32–36)
MCHC RBC-ENTMCNC: 34.9 PG — HIGH (ref 27–34)
MCV RBC AUTO: 102 FL — HIGH (ref 80–100)
PLATELET # BLD AUTO: 147 K/UL — LOW (ref 150–400)
RBC # BLD: 3.34 M/UL — LOW (ref 3.8–5.2)
RBC # FLD: 12.3 % — SIGNIFICANT CHANGE UP (ref 10.3–14.5)
WBC # BLD: 10.5 K/UL — SIGNIFICANT CHANGE UP (ref 3.8–10.5)
WBC # FLD AUTO: 10.5 K/UL — SIGNIFICANT CHANGE UP (ref 3.8–10.5)

## 2018-05-03 RX ADMIN — Medication 100 MILLIGRAM(S): at 14:20

## 2018-05-03 RX ADMIN — Medication 100 MILLIGRAM(S): at 23:54

## 2018-05-03 RX ADMIN — Medication 100 MILLIGRAM(S): at 00:53

## 2018-05-03 RX ADMIN — Medication 100 MILLIGRAM(S): at 17:28

## 2018-05-03 RX ADMIN — Medication 100 MILLIGRAM(S): at 22:15

## 2018-05-03 RX ADMIN — Medication 25 MILLIGRAM(S): at 02:59

## 2018-05-03 RX ADMIN — Medication 100 MILLIGRAM(S): at 05:50

## 2018-05-03 RX ADMIN — Medication 1 TABLET(S): at 12:14

## 2018-05-03 RX ADMIN — Medication 100 MILLIGRAM(S): at 08:33

## 2018-05-03 RX ADMIN — Medication 25 MILLIGRAM(S): at 08:33

## 2018-05-03 RX ADMIN — Medication 100 MILLIGRAM(S): at 14:19

## 2018-05-04 ENCOUNTER — APPOINTMENT (OUTPATIENT)
Dept: ANTEPARTUM | Facility: CLINIC | Age: 33
End: 2018-05-04

## 2018-05-04 ENCOUNTER — ASOB RESULT (OUTPATIENT)
Age: 33
End: 2018-05-04

## 2018-05-04 VITALS
SYSTOLIC BLOOD PRESSURE: 90 MMHG | TEMPERATURE: 100 F | HEART RATE: 81 BPM | RESPIRATION RATE: 16 BRPM | OXYGEN SATURATION: 99 % | DIASTOLIC BLOOD PRESSURE: 61 MMHG

## 2018-05-04 LAB
BASOPHILS # BLD AUTO: 0 K/UL — SIGNIFICANT CHANGE UP (ref 0–0.2)
BASOPHILS NFR BLD AUTO: 0 % — SIGNIFICANT CHANGE UP (ref 0–2)
EOSINOPHIL # BLD AUTO: 0.2 K/UL — SIGNIFICANT CHANGE UP (ref 0–0.5)
EOSINOPHIL NFR BLD AUTO: 2 % — SIGNIFICANT CHANGE UP (ref 0–6)
HCT VFR BLD CALC: 33.5 % — LOW (ref 34.5–45)
HGB BLD-MCNC: 11.6 G/DL — SIGNIFICANT CHANGE UP (ref 11.5–15.5)
LYMPHOCYTES # BLD AUTO: 2.2 K/UL — SIGNIFICANT CHANGE UP (ref 1–3.3)
LYMPHOCYTES # BLD AUTO: 24 % — SIGNIFICANT CHANGE UP (ref 13–44)
MCHC RBC-ENTMCNC: 34.7 GM/DL — SIGNIFICANT CHANGE UP (ref 32–36)
MCHC RBC-ENTMCNC: 35 PG — HIGH (ref 27–34)
MCV RBC AUTO: 101 FL — HIGH (ref 80–100)
MONOCYTES # BLD AUTO: 0.6 K/UL — SIGNIFICANT CHANGE UP (ref 0–0.9)
MONOCYTES NFR BLD AUTO: 6 % — SIGNIFICANT CHANGE UP (ref 2–14)
NEUTROPHILS # BLD AUTO: 7 K/UL — SIGNIFICANT CHANGE UP (ref 1.8–7.4)
NEUTROPHILS NFR BLD AUTO: 68 % — SIGNIFICANT CHANGE UP (ref 43–77)
PLAT MORPH BLD: NORMAL — SIGNIFICANT CHANGE UP
PLATELET # BLD AUTO: 145 K/UL — LOW (ref 150–400)
RBC # BLD: 3.32 M/UL — LOW (ref 3.8–5.2)
RBC # FLD: 12.1 % — SIGNIFICANT CHANGE UP (ref 10.3–14.5)
RBC BLD AUTO: SIGNIFICANT CHANGE UP
WBC # BLD: 10 K/UL — SIGNIFICANT CHANGE UP (ref 3.8–10.5)
WBC # FLD AUTO: 10 K/UL — SIGNIFICANT CHANGE UP (ref 3.8–10.5)

## 2018-05-04 PROCEDURE — 76815 OB US LIMITED FETUS(S): CPT | Mod: 26

## 2018-05-04 PROCEDURE — 76817 TRANSVAGINAL US OBSTETRIC: CPT | Mod: 26

## 2018-05-04 RX ADMIN — Medication 100 MILLIGRAM(S): at 08:34

## 2018-05-04 RX ADMIN — Medication 1 TABLET(S): at 12:25

## 2018-05-04 RX ADMIN — Medication 100 MILLIGRAM(S): at 06:28

## 2018-05-06 LAB
CULTURE RESULTS: SIGNIFICANT CHANGE UP
SPECIMEN SOURCE: SIGNIFICANT CHANGE UP

## 2018-05-09 LAB — CHROM ANALY OVERALL INTERP SPEC-IMP: SIGNIFICANT CHANGE UP

## 2018-05-10 DIAGNOSIS — N83.292 OTHER OVARIAN CYST, LEFT SIDE: ICD-10-CM

## 2018-05-10 DIAGNOSIS — Z36.3 ENCOUNTER FOR ANTENATAL SCREENING FOR MALFORMATIONS: ICD-10-CM

## 2018-05-10 DIAGNOSIS — O09.899 SUPERVISION OF OTHER HIGH RISK PREGNANCIES, UNSPECIFIED TRIMESTER: ICD-10-CM

## 2018-05-10 DIAGNOSIS — O09.292 SUPERVISION OF PREGNANCY WITH OTHER POOR REPRODUCTIVE OR OBSTETRIC HISTORY, SECOND TRIMESTER: ICD-10-CM

## 2018-05-11 ENCOUNTER — APPOINTMENT (OUTPATIENT)
Dept: ANTEPARTUM | Facility: CLINIC | Age: 33
End: 2018-05-11
Payer: MEDICAID

## 2018-05-11 ENCOUNTER — APPOINTMENT (OUTPATIENT)
Dept: MATERNAL FETAL MEDICINE | Facility: CLINIC | Age: 33
End: 2018-05-11

## 2018-05-11 ENCOUNTER — APPOINTMENT (OUTPATIENT)
Dept: ANTEPARTUM | Facility: CLINIC | Age: 33
End: 2018-05-11

## 2018-05-11 ENCOUNTER — ASOB RESULT (OUTPATIENT)
Age: 33
End: 2018-05-11

## 2018-05-11 PROCEDURE — 76817 TRANSVAGINAL US OBSTETRIC: CPT | Mod: 59

## 2018-05-11 PROCEDURE — 99213 OFFICE O/P EST LOW 20 MIN: CPT | Mod: 25

## 2018-05-11 PROCEDURE — 76811 OB US DETAILED SNGL FETUS: CPT

## 2018-05-14 ENCOUNTER — APPOINTMENT (OUTPATIENT)
Dept: MATERNAL FETAL MEDICINE | Facility: CLINIC | Age: 33
End: 2018-05-14

## 2018-05-14 ENCOUNTER — APPOINTMENT (OUTPATIENT)
Dept: ANTEPARTUM | Facility: CLINIC | Age: 33
End: 2018-05-14

## 2018-05-15 ENCOUNTER — APPOINTMENT (OUTPATIENT)
Dept: MATERNAL FETAL MEDICINE | Facility: CLINIC | Age: 33
End: 2018-05-15
Payer: MEDICAID

## 2018-05-15 ENCOUNTER — OUTPATIENT (OUTPATIENT)
Dept: OUTPATIENT SERVICES | Facility: HOSPITAL | Age: 33
LOS: 1 days | End: 2018-05-15
Payer: MEDICAID

## 2018-05-15 ENCOUNTER — OTHER (OUTPATIENT)
Age: 33
End: 2018-05-15

## 2018-05-15 PROCEDURE — 99211 OFF/OP EST MAY X REQ PHY/QHP: CPT

## 2018-05-15 PROCEDURE — G0463: CPT

## 2018-05-16 ENCOUNTER — APPOINTMENT (OUTPATIENT)
Dept: PEDIATRIC CARDIOLOGY | Facility: CLINIC | Age: 33
End: 2018-05-16
Payer: MEDICAID

## 2018-05-16 ENCOUNTER — OUTPATIENT (OUTPATIENT)
Dept: OUTPATIENT SERVICES | Age: 33
LOS: 1 days | Discharge: HOME | End: 2018-05-16

## 2018-05-16 PROCEDURE — 93325 DOPPLER ECHO COLOR FLOW MAPG: CPT | Mod: 59

## 2018-05-16 PROCEDURE — 99203 OFFICE O/P NEW LOW 30 MIN: CPT | Mod: 25

## 2018-05-16 PROCEDURE — 76820 UMBILICAL ARTERY ECHO: CPT

## 2018-05-16 PROCEDURE — 76827 ECHO EXAM OF FETAL HEART: CPT

## 2018-05-16 PROCEDURE — 76825 ECHO EXAM OF FETAL HEART: CPT

## 2018-05-18 ENCOUNTER — APPOINTMENT (OUTPATIENT)
Dept: ANTEPARTUM | Facility: CLINIC | Age: 33
End: 2018-05-18
Payer: MEDICAID

## 2018-05-18 ENCOUNTER — OTHER (OUTPATIENT)
Age: 33
End: 2018-05-18

## 2018-05-18 ENCOUNTER — ASOB RESULT (OUTPATIENT)
Age: 33
End: 2018-05-18

## 2018-05-18 DIAGNOSIS — O09.899 SUPERVISION OF OTHER HIGH RISK PREGNANCIES, UNSPECIFIED TRIMESTER: ICD-10-CM

## 2018-05-18 DIAGNOSIS — O09.292 SUPERVISION OF PREGNANCY WITH OTHER POOR REPRODUCTIVE OR OBSTETRIC HISTORY, SECOND TRIMESTER: ICD-10-CM

## 2018-05-18 DIAGNOSIS — N83.292 OTHER OVARIAN CYST, LEFT SIDE: ICD-10-CM

## 2018-05-18 DIAGNOSIS — Z36.3 ENCOUNTER FOR ANTENATAL SCREENING FOR MALFORMATIONS: ICD-10-CM

## 2018-05-18 PROCEDURE — 76817 TRANSVAGINAL US OBSTETRIC: CPT

## 2018-05-21 ENCOUNTER — NON-APPOINTMENT (OUTPATIENT)
Age: 33
End: 2018-05-21

## 2018-05-21 ENCOUNTER — OUTPATIENT (OUTPATIENT)
Dept: OUTPATIENT SERVICES | Facility: HOSPITAL | Age: 33
LOS: 1 days | End: 2018-05-21
Payer: MEDICAID

## 2018-05-21 ENCOUNTER — APPOINTMENT (OUTPATIENT)
Dept: MATERNAL FETAL MEDICINE | Facility: CLINIC | Age: 33
End: 2018-05-21
Payer: MEDICAID

## 2018-05-21 VITALS
SYSTOLIC BLOOD PRESSURE: 90 MMHG | DIASTOLIC BLOOD PRESSURE: 50 MMHG | HEIGHT: 61.5 IN | BODY MASS INDEX: 30.57 KG/M2 | WEIGHT: 164 LBS

## 2018-05-21 DIAGNOSIS — O09.899 SUPERVISION OF OTHER HIGH RISK PREGNANCIES, UNSPECIFIED TRIMESTER: ICD-10-CM

## 2018-05-21 DIAGNOSIS — K59.09 OTHER CONSTIPATION: ICD-10-CM

## 2018-05-21 LAB
BILIRUB UR QL STRIP: NEGATIVE
COLLECTION METHOD: NORMAL
GLUCOSE UR-MCNC: NORMAL
HCG UR QL: 0.2 EU/DL
HGB UR QL STRIP.AUTO: NEGATIVE
KETONES UR-MCNC: NORMAL
LEUKOCYTE ESTERASE UR QL STRIP: NEGATIVE
NITRITE UR QL STRIP: NEGATIVE
PH UR STRIP: 6
PROT UR STRIP-MCNC: NEGATIVE
SP GR UR STRIP: 1.02

## 2018-05-21 PROCEDURE — 96372 THER/PROPH/DIAG INJ SC/IM: CPT

## 2018-05-21 PROCEDURE — 99213 OFFICE O/P EST LOW 20 MIN: CPT | Mod: 25,GE

## 2018-05-21 PROCEDURE — 96372 THER/PROPH/DIAG INJ SC/IM: CPT | Mod: NC

## 2018-05-21 PROCEDURE — G0463: CPT

## 2018-05-22 ENCOUNTER — NON-APPOINTMENT (OUTPATIENT)
Age: 33
End: 2018-05-22

## 2018-05-23 PROCEDURE — 84112 EVAL AMNIOTIC FLUID PROTEIN: CPT

## 2018-05-23 PROCEDURE — 87075 CULTR BACTERIA EXCEPT BLOOD: CPT

## 2018-05-23 PROCEDURE — 81001 URINALYSIS AUTO W/SCOPE: CPT

## 2018-05-23 PROCEDURE — 83615 LACTATE (LD) (LDH) ENZYME: CPT

## 2018-05-23 PROCEDURE — 88280 CHROMOSOME KARYOTYPE STUDY: CPT

## 2018-05-23 PROCEDURE — 86850 RBC ANTIBODY SCREEN: CPT

## 2018-05-23 PROCEDURE — 86901 BLOOD TYPING SEROLOGIC RH(D): CPT

## 2018-05-23 PROCEDURE — 86900 BLOOD TYPING SEROLOGIC ABO: CPT

## 2018-05-23 PROCEDURE — 88269 CHROMOSOME ANALYS AMNIOTIC: CPT

## 2018-05-23 PROCEDURE — 88235 TISSUE CULTURE PLACENTA: CPT

## 2018-05-23 PROCEDURE — 86780 TREPONEMA PALLIDUM: CPT

## 2018-05-23 PROCEDURE — 87205 SMEAR GRAM STAIN: CPT

## 2018-05-23 PROCEDURE — 89051 BODY FLUID CELL COUNT: CPT

## 2018-05-23 PROCEDURE — G0463: CPT

## 2018-05-23 PROCEDURE — 87086 URINE CULTURE/COLONY COUNT: CPT

## 2018-05-23 PROCEDURE — 85027 COMPLETE CBC AUTOMATED: CPT

## 2018-05-23 PROCEDURE — 82945 GLUCOSE OTHER FLUID: CPT

## 2018-05-23 PROCEDURE — 87070 CULTURE OTHR SPECIMN AEROBIC: CPT

## 2018-05-23 PROCEDURE — 88285 CHROMOSOME COUNT ADDITIONAL: CPT

## 2018-05-25 ENCOUNTER — ASOB RESULT (OUTPATIENT)
Age: 33
End: 2018-05-25

## 2018-05-25 ENCOUNTER — APPOINTMENT (OUTPATIENT)
Dept: ANTEPARTUM | Facility: CLINIC | Age: 33
End: 2018-05-25
Payer: MEDICAID

## 2018-05-25 PROCEDURE — 76817 TRANSVAGINAL US OBSTETRIC: CPT

## 2018-05-25 PROCEDURE — 76816 OB US FOLLOW-UP PER FETUS: CPT

## 2018-05-29 ENCOUNTER — OTHER (OUTPATIENT)
Age: 33
End: 2018-05-29

## 2018-05-29 ENCOUNTER — APPOINTMENT (OUTPATIENT)
Dept: MATERNAL FETAL MEDICINE | Facility: CLINIC | Age: 33
End: 2018-05-29
Payer: MEDICAID

## 2018-05-29 ENCOUNTER — OUTPATIENT (OUTPATIENT)
Dept: OUTPATIENT SERVICES | Facility: HOSPITAL | Age: 33
LOS: 1 days | End: 2018-05-29
Payer: MEDICAID

## 2018-05-29 VITALS — WEIGHT: 164 LBS | SYSTOLIC BLOOD PRESSURE: 100 MMHG | BODY MASS INDEX: 30.49 KG/M2 | DIASTOLIC BLOOD PRESSURE: 60 MMHG

## 2018-05-29 DIAGNOSIS — O09.292 SUPERVISION OF PREGNANCY WITH OTHER POOR REPRODUCTIVE OR OBSTETRIC HISTORY, SECOND TRIMESTER: ICD-10-CM

## 2018-05-29 DIAGNOSIS — O47.00 FALSE LABOR BEFORE 37 COMPLETED WEEKS OF GESTATION, UNSPECIFIED TRIMESTER: ICD-10-CM

## 2018-05-29 PROCEDURE — 99211 OFF/OP EST MAY X REQ PHY/QHP: CPT | Mod: 25

## 2018-05-29 PROCEDURE — G0463: CPT

## 2018-06-01 DIAGNOSIS — O09.899 SUPERVISION OF OTHER HIGH RISK PREGNANCIES, UNSPECIFIED TRIMESTER: ICD-10-CM

## 2018-06-02 DIAGNOSIS — O47.00 FALSE LABOR BEFORE 37 COMPLETED WEEKS OF GESTATION, UNSPECIFIED TRIMESTER: ICD-10-CM

## 2018-06-04 ENCOUNTER — ASOB RESULT (OUTPATIENT)
Age: 33
End: 2018-06-04

## 2018-06-04 ENCOUNTER — LABORATORY RESULT (OUTPATIENT)
Age: 33
End: 2018-06-04

## 2018-06-04 ENCOUNTER — APPOINTMENT (OUTPATIENT)
Dept: MATERNAL FETAL MEDICINE | Facility: CLINIC | Age: 33
End: 2018-06-04
Payer: MEDICAID

## 2018-06-04 ENCOUNTER — APPOINTMENT (OUTPATIENT)
Dept: ANTEPARTUM | Facility: CLINIC | Age: 33
End: 2018-06-04
Payer: MEDICAID

## 2018-06-04 ENCOUNTER — OUTPATIENT (OUTPATIENT)
Dept: OUTPATIENT SERVICES | Facility: HOSPITAL | Age: 33
LOS: 1 days | End: 2018-06-04
Payer: MEDICAID

## 2018-06-04 ENCOUNTER — NON-APPOINTMENT (OUTPATIENT)
Age: 33
End: 2018-06-04

## 2018-06-04 VITALS
SYSTOLIC BLOOD PRESSURE: 84 MMHG | BODY MASS INDEX: 30.94 KG/M2 | WEIGHT: 166 LBS | HEIGHT: 61.5 IN | DIASTOLIC BLOOD PRESSURE: 50 MMHG

## 2018-06-04 DIAGNOSIS — O09.899 SUPERVISION OF OTHER HIGH RISK PREGNANCIES, UNSPECIFIED TRIMESTER: ICD-10-CM

## 2018-06-04 LAB
BILIRUB UR QL STRIP: NEGATIVE
COLLECTION METHOD: NORMAL
GLUCOSE UR-MCNC: NEGATIVE
HCG UR QL: 0.2 EU/DL
HGB UR QL STRIP.AUTO: NEGATIVE
KETONES UR-MCNC: NEGATIVE
LEUKOCYTE ESTERASE UR QL STRIP: NEGATIVE
NITRITE UR QL STRIP: NEGATIVE
PH UR STRIP: 5.5
PROT UR STRIP-MCNC: NEGATIVE
SP GR UR STRIP: >=1.03

## 2018-06-04 PROCEDURE — G0463: CPT

## 2018-06-04 PROCEDURE — 76817 TRANSVAGINAL US OBSTETRIC: CPT | Mod: 26

## 2018-06-04 PROCEDURE — 76815 OB US LIMITED FETUS(S): CPT

## 2018-06-04 PROCEDURE — 99213 OFFICE O/P EST LOW 20 MIN: CPT | Mod: 25,GC

## 2018-06-04 PROCEDURE — 76815 OB US LIMITED FETUS(S): CPT | Mod: 26

## 2018-06-04 PROCEDURE — 76817 TRANSVAGINAL US OBSTETRIC: CPT

## 2018-06-04 PROCEDURE — 86900 BLOOD TYPING SEROLOGIC ABO: CPT

## 2018-06-04 PROCEDURE — 82950 GLUCOSE TEST: CPT

## 2018-06-04 PROCEDURE — 85027 COMPLETE CBC AUTOMATED: CPT

## 2018-06-04 PROCEDURE — 86850 RBC ANTIBODY SCREEN: CPT

## 2018-06-05 LAB
GLUCOSE 1H P MEAL SERPL-MCNC: 105 MG/DL — SIGNIFICANT CHANGE UP (ref 70–134)
HCT VFR BLD CALC: 37.8 % — SIGNIFICANT CHANGE UP (ref 34.5–45)
HGB BLD-MCNC: 12.4 G/DL — SIGNIFICANT CHANGE UP (ref 11.5–15.5)
MCHC RBC-ENTMCNC: 32.8 GM/DL — SIGNIFICANT CHANGE UP (ref 32–36)
MCHC RBC-ENTMCNC: 35 PG — HIGH (ref 27–34)
MCV RBC AUTO: 106.8 FL — HIGH (ref 80–100)
PLATELET # BLD AUTO: 163 K/UL — SIGNIFICANT CHANGE UP (ref 150–400)
RBC # BLD: 3.54 M/UL — LOW (ref 3.8–5.2)
RBC # FLD: 14.2 % — SIGNIFICANT CHANGE UP (ref 10.3–14.5)
WBC # BLD: 8.71 K/UL — SIGNIFICANT CHANGE UP (ref 3.8–10.5)
WBC # FLD AUTO: 8.71 K/UL — SIGNIFICANT CHANGE UP (ref 3.8–10.5)

## 2018-06-09 DIAGNOSIS — O47.00 FALSE LABOR BEFORE 37 COMPLETED WEEKS OF GESTATION, UNSPECIFIED TRIMESTER: ICD-10-CM

## 2018-06-09 DIAGNOSIS — O34.30 MATERNAL CARE FOR CERVICAL INCOMPETENCE, UNSPECIFIED TRIMESTER: ICD-10-CM

## 2018-06-11 ENCOUNTER — APPOINTMENT (OUTPATIENT)
Dept: MATERNAL FETAL MEDICINE | Facility: CLINIC | Age: 33
End: 2018-06-11
Payer: MEDICAID

## 2018-06-11 ENCOUNTER — APPOINTMENT (OUTPATIENT)
Dept: ANTEPARTUM | Facility: CLINIC | Age: 33
End: 2018-06-11
Payer: MEDICAID

## 2018-06-11 ENCOUNTER — ASOB RESULT (OUTPATIENT)
Age: 33
End: 2018-06-11

## 2018-06-11 ENCOUNTER — OUTPATIENT (OUTPATIENT)
Dept: OUTPATIENT SERVICES | Facility: HOSPITAL | Age: 33
LOS: 1 days | End: 2018-06-11
Payer: MEDICAID

## 2018-06-11 VITALS
WEIGHT: 166.2 LBS | DIASTOLIC BLOOD PRESSURE: 70 MMHG | SYSTOLIC BLOOD PRESSURE: 124 MMHG | HEIGHT: 61.5 IN | BODY MASS INDEX: 30.98 KG/M2

## 2018-06-11 DIAGNOSIS — O09.899 SUPERVISION OF OTHER HIGH RISK PREGNANCIES, UNSPECIFIED TRIMESTER: ICD-10-CM

## 2018-06-11 LAB
BILIRUB UR QL STRIP: NEGATIVE
COLLECTION METHOD: NORMAL
GLUCOSE UR-MCNC: NEGATIVE
HCG UR QL: 0.2 EU/DL
HGB UR QL STRIP.AUTO: NORMAL
KETONES UR-MCNC: NORMAL
LEUKOCYTE ESTERASE UR QL STRIP: NORMAL
NITRITE UR QL STRIP: NEGATIVE
PH UR STRIP: 5.5
PROT UR STRIP-MCNC: NEGATIVE
SP GR UR STRIP: 1.01

## 2018-06-11 PROCEDURE — G0463: CPT

## 2018-06-11 PROCEDURE — 76817 TRANSVAGINAL US OBSTETRIC: CPT | Mod: 26,59

## 2018-06-11 PROCEDURE — 76816 OB US FOLLOW-UP PER FETUS: CPT | Mod: 26

## 2018-06-11 PROCEDURE — 76816 OB US FOLLOW-UP PER FETUS: CPT

## 2018-06-11 PROCEDURE — 99213 OFFICE O/P EST LOW 20 MIN: CPT | Mod: GE

## 2018-06-11 PROCEDURE — 76817 TRANSVAGINAL US OBSTETRIC: CPT

## 2018-06-18 ENCOUNTER — NON-APPOINTMENT (OUTPATIENT)
Age: 33
End: 2018-06-18

## 2018-06-18 ENCOUNTER — APPOINTMENT (OUTPATIENT)
Dept: MATERNAL FETAL MEDICINE | Facility: CLINIC | Age: 33
End: 2018-06-18
Payer: MEDICAID

## 2018-06-18 ENCOUNTER — APPOINTMENT (OUTPATIENT)
Dept: ANTEPARTUM | Facility: CLINIC | Age: 33
End: 2018-06-18
Payer: MEDICAID

## 2018-06-18 ENCOUNTER — ASOB RESULT (OUTPATIENT)
Age: 33
End: 2018-06-18

## 2018-06-18 ENCOUNTER — OUTPATIENT (OUTPATIENT)
Dept: OUTPATIENT SERVICES | Facility: HOSPITAL | Age: 33
LOS: 1 days | End: 2018-06-18
Payer: MEDICAID

## 2018-06-18 DIAGNOSIS — O47.00 FALSE LABOR BEFORE 37 COMPLETED WEEKS OF GESTATION, UNSPECIFIED TRIMESTER: ICD-10-CM

## 2018-06-18 DIAGNOSIS — O09.292 SUPERVISION OF PREGNANCY WITH OTHER POOR REPRODUCTIVE OR OBSTETRIC HISTORY, SECOND TRIMESTER: ICD-10-CM

## 2018-06-18 DIAGNOSIS — O09.899 SUPERVISION OF OTHER HIGH RISK PREGNANCIES, UNSPECIFIED TRIMESTER: ICD-10-CM

## 2018-06-18 DIAGNOSIS — O09.90 SUPERVISION OF HIGH RISK PREGNANCY, UNSPECIFIED, UNSPECIFIED TRIMESTER: ICD-10-CM

## 2018-06-18 LAB
BILIRUB UR QL STRIP: NEGATIVE
COLLECTION METHOD: NORMAL
GLUCOSE UR-MCNC: NEGATIVE
HCG UR QL: 0.2 EU/DL
HGB UR QL STRIP.AUTO: NEGATIVE
KETONES UR-MCNC: NEGATIVE
LEUKOCYTE ESTERASE UR QL STRIP: NEGATIVE
NITRITE UR QL STRIP: NEGATIVE
PH UR STRIP: 6.5
PROT UR STRIP-MCNC: NEGATIVE
SP GR UR STRIP: 1.02

## 2018-06-18 PROCEDURE — 81003 URINALYSIS AUTO W/O SCOPE: CPT

## 2018-06-18 PROCEDURE — 76815 OB US LIMITED FETUS(S): CPT | Mod: 26

## 2018-06-18 PROCEDURE — 99213 OFFICE O/P EST LOW 20 MIN: CPT | Mod: 25,GC

## 2018-06-18 PROCEDURE — 96372 THER/PROPH/DIAG INJ SC/IM: CPT | Mod: NC

## 2018-06-18 PROCEDURE — 90471 IMMUNIZATION ADMIN: CPT | Mod: NC

## 2018-06-18 PROCEDURE — 90471 IMMUNIZATION ADMIN: CPT

## 2018-06-18 PROCEDURE — 81003 URINALYSIS AUTO W/O SCOPE: CPT | Mod: NC,QW

## 2018-06-18 PROCEDURE — 76815 OB US LIMITED FETUS(S): CPT

## 2018-06-18 PROCEDURE — 96372 THER/PROPH/DIAG INJ SC/IM: CPT

## 2018-06-18 PROCEDURE — G0463: CPT | Mod: 25

## 2018-06-19 DIAGNOSIS — O34.30 MATERNAL CARE FOR CERVICAL INCOMPETENCE, UNSPECIFIED TRIMESTER: ICD-10-CM

## 2018-06-19 DIAGNOSIS — O26.872 CERVICAL SHORTENING, SECOND TRIMESTER: ICD-10-CM

## 2018-06-19 DIAGNOSIS — O09.292 SUPERVISION OF PREGNANCY WITH OTHER POOR REPRODUCTIVE OR OBSTETRIC HISTORY, SECOND TRIMESTER: ICD-10-CM

## 2018-06-19 DIAGNOSIS — O35.8XX0 MATERNAL CARE FOR OTHER (SUSPECTED) FETAL ABNORMALITY AND DAMAGE, NOT APPLICABLE OR UNSPECIFIED: ICD-10-CM

## 2018-06-19 DIAGNOSIS — Z3A.27 27 WEEKS GESTATION OF PREGNANCY: ICD-10-CM

## 2018-06-26 ENCOUNTER — APPOINTMENT (OUTPATIENT)
Dept: MATERNAL FETAL MEDICINE | Facility: CLINIC | Age: 33
End: 2018-06-26

## 2018-06-26 ENCOUNTER — OTHER (OUTPATIENT)
Age: 33
End: 2018-06-26

## 2018-06-26 DIAGNOSIS — O47.00 FALSE LABOR BEFORE 37 COMPLETED WEEKS OF GESTATION, UNSPECIFIED TRIMESTER: ICD-10-CM

## 2018-06-26 DIAGNOSIS — O09.899 SUPERVISION OF OTHER HIGH RISK PREGNANCIES, UNSPECIFIED TRIMESTER: ICD-10-CM

## 2018-06-26 DIAGNOSIS — O34.30 MATERNAL CARE FOR CERVICAL INCOMPETENCE, UNSPECIFIED TRIMESTER: ICD-10-CM

## 2018-07-02 ENCOUNTER — ASOB RESULT (OUTPATIENT)
Age: 33
End: 2018-07-02

## 2018-07-02 ENCOUNTER — APPOINTMENT (OUTPATIENT)
Dept: ANTEPARTUM | Facility: CLINIC | Age: 33
End: 2018-07-02
Payer: MEDICAID

## 2018-07-02 ENCOUNTER — APPOINTMENT (OUTPATIENT)
Dept: MATERNAL FETAL MEDICINE | Facility: CLINIC | Age: 33
End: 2018-07-02
Payer: MEDICAID

## 2018-07-02 ENCOUNTER — OUTPATIENT (OUTPATIENT)
Dept: OUTPATIENT SERVICES | Facility: HOSPITAL | Age: 33
LOS: 1 days | End: 2018-07-02
Payer: MEDICAID

## 2018-07-02 VITALS
WEIGHT: 169 LBS | SYSTOLIC BLOOD PRESSURE: 86 MMHG | DIASTOLIC BLOOD PRESSURE: 50 MMHG | BODY MASS INDEX: 31.5 KG/M2 | HEIGHT: 61.5 IN

## 2018-07-02 DIAGNOSIS — O09.899 SUPERVISION OF OTHER HIGH RISK PREGNANCIES, UNSPECIFIED TRIMESTER: ICD-10-CM

## 2018-07-02 LAB
BILIRUB UR QL STRIP: NEGATIVE
COLLECTION METHOD: NORMAL
GLUCOSE UR-MCNC: NORMAL
HCG UR QL: 0.2 EU/DL
HGB UR QL STRIP.AUTO: NEGATIVE
KETONES UR-MCNC: NORMAL
LEUKOCYTE ESTERASE UR QL STRIP: NORMAL
NITRITE UR QL STRIP: NEGATIVE
PH UR STRIP: 6
PROT UR STRIP-MCNC: NEGATIVE
SP GR UR STRIP: >=1.03

## 2018-07-02 PROCEDURE — 81003 URINALYSIS AUTO W/O SCOPE: CPT | Mod: NC,QW

## 2018-07-02 PROCEDURE — 99213 OFFICE O/P EST LOW 20 MIN: CPT | Mod: 25,GE

## 2018-07-02 PROCEDURE — 76816 OB US FOLLOW-UP PER FETUS: CPT | Mod: 26

## 2018-07-02 PROCEDURE — 81003 URINALYSIS AUTO W/O SCOPE: CPT

## 2018-07-02 PROCEDURE — G0463: CPT

## 2018-07-03 ENCOUNTER — NON-APPOINTMENT (OUTPATIENT)
Age: 33
End: 2018-07-03

## 2018-07-10 ENCOUNTER — OTHER (OUTPATIENT)
Age: 33
End: 2018-07-10

## 2018-07-10 ENCOUNTER — MED ADMIN CHARGE (OUTPATIENT)
Age: 33
End: 2018-07-10

## 2018-07-10 DIAGNOSIS — O09.93 SUPERVISION OF HIGH RISK PREGNANCY, UNSPECIFIED, THIRD TRIMESTER: ICD-10-CM

## 2018-07-16 ENCOUNTER — NON-APPOINTMENT (OUTPATIENT)
Age: 33
End: 2018-07-16

## 2018-07-16 ENCOUNTER — APPOINTMENT (OUTPATIENT)
Dept: ANTEPARTUM | Facility: CLINIC | Age: 33
End: 2018-07-16
Payer: MEDICAID

## 2018-07-16 ENCOUNTER — APPOINTMENT (OUTPATIENT)
Dept: MATERNAL FETAL MEDICINE | Facility: CLINIC | Age: 33
End: 2018-07-16
Payer: MEDICAID

## 2018-07-16 ENCOUNTER — OUTPATIENT (OUTPATIENT)
Dept: OUTPATIENT SERVICES | Facility: HOSPITAL | Age: 33
LOS: 1 days | End: 2018-07-16
Payer: MEDICAID

## 2018-07-16 VITALS
BODY MASS INDEX: 31.87 KG/M2 | SYSTOLIC BLOOD PRESSURE: 90 MMHG | DIASTOLIC BLOOD PRESSURE: 56 MMHG | HEIGHT: 61.5 IN | WEIGHT: 171 LBS

## 2018-07-16 DIAGNOSIS — O09.899 SUPERVISION OF OTHER HIGH RISK PREGNANCIES, UNSPECIFIED TRIMESTER: ICD-10-CM

## 2018-07-16 LAB
BILIRUB UR QL STRIP: NEGATIVE
COLLECTION METHOD: NORMAL
GLUCOSE UR-MCNC: NEGATIVE
HCG UR QL: 1 EU/DL
HGB UR QL STRIP.AUTO: NEGATIVE
KETONES UR-MCNC: NEGATIVE
LEUKOCYTE ESTERASE UR QL STRIP: NEGATIVE
NITRITE UR QL STRIP: NEGATIVE
PH UR STRIP: 5.5
PROT UR STRIP-MCNC: NEGATIVE
SP GR UR STRIP: >=1.03

## 2018-07-16 PROCEDURE — 81003 URINALYSIS AUTO W/O SCOPE: CPT

## 2018-07-16 PROCEDURE — 81003 URINALYSIS AUTO W/O SCOPE: CPT | Mod: NC,QW

## 2018-07-16 PROCEDURE — 99213 OFFICE O/P EST LOW 20 MIN: CPT | Mod: 25,GE

## 2018-07-16 PROCEDURE — 76819 FETAL BIOPHYS PROFIL W/O NST: CPT

## 2018-07-16 PROCEDURE — 76819 FETAL BIOPHYS PROFIL W/O NST: CPT | Mod: 26

## 2018-07-16 PROCEDURE — G0463: CPT

## 2018-07-17 ENCOUNTER — NON-APPOINTMENT (OUTPATIENT)
Age: 33
End: 2018-07-17

## 2018-07-17 DIAGNOSIS — O35.8XX0 MATERNAL CARE FOR OTHER (SUSPECTED) FETAL ABNORMALITY AND DAMAGE, NOT APPLICABLE OR UNSPECIFIED: ICD-10-CM

## 2018-07-17 DIAGNOSIS — O34.30 MATERNAL CARE FOR CERVICAL INCOMPETENCE, UNSPECIFIED TRIMESTER: ICD-10-CM

## 2018-07-17 DIAGNOSIS — O09.293 SUPERVISION OF PREGNANCY WITH OTHER POOR REPRODUCTIVE OR OBSTETRIC HISTORY, THIRD TRIMESTER: ICD-10-CM

## 2018-07-17 DIAGNOSIS — O26.873 CERVICAL SHORTENING, THIRD TRIMESTER: ICD-10-CM

## 2018-07-17 DIAGNOSIS — Z3A.31 31 WEEKS GESTATION OF PREGNANCY: ICD-10-CM

## 2018-07-24 ENCOUNTER — MED ADMIN CHARGE (OUTPATIENT)
Age: 33
End: 2018-07-24

## 2018-07-24 ENCOUNTER — OTHER (OUTPATIENT)
Age: 33
End: 2018-07-24

## 2018-07-24 ENCOUNTER — APPOINTMENT (OUTPATIENT)
Dept: MATERNAL FETAL MEDICINE | Facility: CLINIC | Age: 33
End: 2018-07-24
Payer: MEDICAID

## 2018-07-24 PROCEDURE — 99211 OFF/OP EST MAY X REQ PHY/QHP: CPT

## 2018-07-30 ENCOUNTER — APPOINTMENT (OUTPATIENT)
Dept: MATERNAL FETAL MEDICINE | Facility: CLINIC | Age: 33
End: 2018-07-30
Payer: MEDICAID

## 2018-07-30 ENCOUNTER — ASOB RESULT (OUTPATIENT)
Age: 33
End: 2018-07-30

## 2018-07-30 ENCOUNTER — OUTPATIENT (OUTPATIENT)
Dept: OUTPATIENT SERVICES | Facility: HOSPITAL | Age: 33
LOS: 1 days | End: 2018-07-30
Payer: MEDICAID

## 2018-07-30 ENCOUNTER — NON-APPOINTMENT (OUTPATIENT)
Age: 33
End: 2018-07-30

## 2018-07-30 ENCOUNTER — APPOINTMENT (OUTPATIENT)
Dept: ANTEPARTUM | Facility: CLINIC | Age: 33
End: 2018-07-30
Payer: MEDICAID

## 2018-07-30 DIAGNOSIS — O09.899 SUPERVISION OF OTHER HIGH RISK PREGNANCIES, UNSPECIFIED TRIMESTER: ICD-10-CM

## 2018-07-30 PROCEDURE — 76816 OB US FOLLOW-UP PER FETUS: CPT | Mod: 26

## 2018-07-30 PROCEDURE — 99213 OFFICE O/P EST LOW 20 MIN: CPT | Mod: 25,GE

## 2018-07-30 PROCEDURE — G0463: CPT

## 2018-07-30 PROCEDURE — 76816 OB US FOLLOW-UP PER FETUS: CPT

## 2018-07-30 PROCEDURE — 81003 URINALYSIS AUTO W/O SCOPE: CPT | Mod: NC,QW

## 2018-07-30 PROCEDURE — 81003 URINALYSIS AUTO W/O SCOPE: CPT

## 2018-08-03 DIAGNOSIS — Z87.51 PERSONAL HISTORY OF PRE-TERM LABOR: ICD-10-CM

## 2018-08-03 DIAGNOSIS — O09.93 SUPERVISION OF HIGH RISK PREGNANCY, UNSPECIFIED, THIRD TRIMESTER: ICD-10-CM

## 2018-08-06 ENCOUNTER — APPOINTMENT (OUTPATIENT)
Dept: ANTEPARTUM | Facility: CLINIC | Age: 33
End: 2018-08-06
Payer: MEDICAID

## 2018-08-06 ENCOUNTER — ASOB RESULT (OUTPATIENT)
Age: 33
End: 2018-08-06

## 2018-08-06 ENCOUNTER — RESULT REVIEW (OUTPATIENT)
Age: 33
End: 2018-08-06

## 2018-08-06 ENCOUNTER — OUTPATIENT (OUTPATIENT)
Dept: OUTPATIENT SERVICES | Facility: HOSPITAL | Age: 33
LOS: 1 days | End: 2018-08-06
Payer: MEDICAID

## 2018-08-06 ENCOUNTER — TRANSCRIPTION ENCOUNTER (OUTPATIENT)
Age: 33
End: 2018-08-06

## 2018-08-06 ENCOUNTER — INPATIENT (INPATIENT)
Facility: HOSPITAL | Age: 33
LOS: 3 days | Discharge: HOME | End: 2018-08-10
Attending: OBSTETRICS & GYNECOLOGY | Admitting: OBSTETRICS & GYNECOLOGY
Payer: MEDICAID

## 2018-08-06 DIAGNOSIS — Z3A.00 WEEKS OF GESTATION OF PREGNANCY NOT SPECIFIED: ICD-10-CM

## 2018-08-06 DIAGNOSIS — O26.899 OTHER SPECIFIED PREGNANCY RELATED CONDITIONS, UNSPECIFIED TRIMESTER: ICD-10-CM

## 2018-08-06 DIAGNOSIS — Z34.80 ENCOUNTER FOR SUPERVISION OF OTHER NORMAL PREGNANCY, UNSPECIFIED TRIMESTER: ICD-10-CM

## 2018-08-06 LAB
AMNISURE ROM (RUPTURE OF MEMBRANES): POSITIVE
APPEARANCE UR: ABNORMAL
BILIRUB UR-MCNC: NEGATIVE — SIGNIFICANT CHANGE UP
COLOR SPEC: YELLOW — SIGNIFICANT CHANGE UP
COMMENT - URINE: SIGNIFICANT CHANGE UP
DIFF PNL FLD: NEGATIVE — SIGNIFICANT CHANGE UP
EPI CELLS # UR: SIGNIFICANT CHANGE UP /HPF
GLUCOSE UR QL: NEGATIVE — SIGNIFICANT CHANGE UP
HCT VFR BLD CALC: 39.7 % — SIGNIFICANT CHANGE UP (ref 34.5–45)
HGB BLD-MCNC: 13.5 G/DL — SIGNIFICANT CHANGE UP (ref 11.5–15.5)
KETONES UR-MCNC: NEGATIVE — SIGNIFICANT CHANGE UP
LEUKOCYTE ESTERASE UR-ACNC: ABNORMAL
MCHC RBC-ENTMCNC: 33.3 PG — SIGNIFICANT CHANGE UP (ref 27–34)
MCHC RBC-ENTMCNC: 34 GM/DL — SIGNIFICANT CHANGE UP (ref 32–36)
MCV RBC AUTO: 97.9 FL — SIGNIFICANT CHANGE UP (ref 80–100)
NITRITE UR-MCNC: NEGATIVE — SIGNIFICANT CHANGE UP
PH UR: 6.5 — SIGNIFICANT CHANGE UP (ref 5–8)
PLATELET # BLD AUTO: 169 K/UL — SIGNIFICANT CHANGE UP (ref 150–400)
PROT UR-MCNC: SIGNIFICANT CHANGE UP
RBC # BLD: 4.05 M/UL — SIGNIFICANT CHANGE UP (ref 3.8–5.2)
RBC # FLD: 12.6 % — SIGNIFICANT CHANGE UP (ref 10.3–14.5)
RBC CASTS # UR COMP ASSIST: NEGATIVE /HPF — SIGNIFICANT CHANGE UP (ref 0–2)
SP GR SPEC: 1.02 — SIGNIFICANT CHANGE UP (ref 1.01–1.02)
UROBILINOGEN FLD QL: NEGATIVE — SIGNIFICANT CHANGE UP
WBC # BLD: 10 K/UL — SIGNIFICANT CHANGE UP (ref 3.8–10.5)
WBC # FLD AUTO: 10 K/UL — SIGNIFICANT CHANGE UP (ref 3.8–10.5)
WBC UR QL: SIGNIFICANT CHANGE UP /HPF (ref 0–5)

## 2018-08-06 PROCEDURE — 76818 FETAL BIOPHYS PROFILE W/NST: CPT | Mod: 26

## 2018-08-06 PROCEDURE — 76818 FETAL BIOPHYS PROFILE W/NST: CPT

## 2018-08-06 RX ORDER — AMPICILLIN TRIHYDRATE 250 MG
2 CAPSULE ORAL ONCE
Qty: 0 | Refills: 0 | Status: COMPLETED | OUTPATIENT
Start: 2018-08-06 | End: 2018-08-07

## 2018-08-06 RX ORDER — AMPICILLIN TRIHYDRATE 250 MG
CAPSULE ORAL
Qty: 0 | Refills: 0 | Status: DISCONTINUED | OUTPATIENT
Start: 2018-08-07 | End: 2018-08-07

## 2018-08-06 RX ORDER — AMPICILLIN TRIHYDRATE 250 MG
2 CAPSULE ORAL EVERY 6 HOURS
Qty: 0 | Refills: 0 | Status: DISCONTINUED | OUTPATIENT
Start: 2018-08-07 | End: 2018-08-07

## 2018-08-06 RX ORDER — AZITHROMYCIN 500 MG/1
1000 TABLET, FILM COATED ORAL ONCE
Qty: 0 | Refills: 0 | Status: COMPLETED | OUTPATIENT
Start: 2018-08-06 | End: 2018-08-06

## 2018-08-06 RX ADMIN — AZITHROMYCIN 1000 MILLIGRAM(S): 500 TABLET, FILM COATED ORAL at 23:25

## 2018-08-06 RX ADMIN — Medication 12 MILLIGRAM(S): at 23:18

## 2018-08-07 VITALS
SYSTOLIC BLOOD PRESSURE: 97 MMHG | HEART RATE: 84 BPM | RESPIRATION RATE: 16 BRPM | OXYGEN SATURATION: 97 % | TEMPERATURE: 98 F | DIASTOLIC BLOOD PRESSURE: 50 MMHG

## 2018-08-07 LAB
BASOPHILS # BLD AUTO: 0 K/UL — SIGNIFICANT CHANGE UP (ref 0–0.2)
BLD GP AB SCN SERPL QL: NEGATIVE — SIGNIFICANT CHANGE UP
EOSINOPHIL # BLD AUTO: 0.1 K/UL — SIGNIFICANT CHANGE UP (ref 0–0.5)
EOSINOPHIL NFR BLD AUTO: 1 % — SIGNIFICANT CHANGE UP (ref 0–6)
LYMPHOCYTES # BLD AUTO: 2.4 K/UL — SIGNIFICANT CHANGE UP (ref 1–3.3)
LYMPHOCYTES # BLD AUTO: 25 % — SIGNIFICANT CHANGE UP (ref 13–44)
MONOCYTES # BLD AUTO: 0.7 K/UL — SIGNIFICANT CHANGE UP (ref 0–0.9)
MONOCYTES NFR BLD AUTO: 7 % — SIGNIFICANT CHANGE UP (ref 2–14)
NEUTROPHILS # BLD AUTO: 7.2 K/UL — SIGNIFICANT CHANGE UP (ref 1.8–7.4)
NEUTROPHILS NFR BLD AUTO: 66 % — SIGNIFICANT CHANGE UP (ref 43–77)
NEUTS BAND # BLD: 1 % — SIGNIFICANT CHANGE UP (ref 0–8)
PLAT MORPH BLD: NORMAL — SIGNIFICANT CHANGE UP
RBC BLD AUTO: SIGNIFICANT CHANGE UP
RH IG SCN BLD-IMP: POSITIVE — SIGNIFICANT CHANGE UP
T PALLIDUM AB TITR SER: NEGATIVE — SIGNIFICANT CHANGE UP

## 2018-08-07 PROCEDURE — 59514 CESAREAN DELIVERY ONLY: CPT | Mod: U7

## 2018-08-07 PROCEDURE — 59871 REMOVE CERCLAGE SUTURE: CPT

## 2018-08-07 RX ORDER — ACETAMINOPHEN 500 MG
975 TABLET ORAL EVERY 6 HOURS
Qty: 0 | Refills: 0 | Status: DISCONTINUED | OUTPATIENT
Start: 2018-08-07 | End: 2018-08-10

## 2018-08-07 RX ORDER — FERROUS SULFATE 325(65) MG
325 TABLET ORAL DAILY
Qty: 0 | Refills: 0 | Status: DISCONTINUED | OUTPATIENT
Start: 2018-08-07 | End: 2018-08-10

## 2018-08-07 RX ORDER — GLYCERIN ADULT
1 SUPPOSITORY, RECTAL RECTAL AT BEDTIME
Qty: 0 | Refills: 0 | Status: DISCONTINUED | OUTPATIENT
Start: 2018-08-07 | End: 2018-08-10

## 2018-08-07 RX ORDER — LANOLIN
1 OINTMENT (GRAM) TOPICAL
Qty: 0 | Refills: 0 | Status: DISCONTINUED | OUTPATIENT
Start: 2018-08-07 | End: 2018-08-10

## 2018-08-07 RX ORDER — NALOXONE HYDROCHLORIDE 4 MG/.1ML
0.1 SPRAY NASAL
Qty: 0 | Refills: 0 | Status: DISCONTINUED | OUTPATIENT
Start: 2018-08-07 | End: 2018-08-09

## 2018-08-07 RX ORDER — TETANUS TOXOID, REDUCED DIPHTHERIA TOXOID AND ACELLULAR PERTUSSIS VACCINE, ADSORBED 5; 2.5; 8; 8; 2.5 [IU]/.5ML; [IU]/.5ML; UG/.5ML; UG/.5ML; UG/.5ML
0.5 SUSPENSION INTRAMUSCULAR ONCE
Qty: 0 | Refills: 0 | Status: DISCONTINUED | OUTPATIENT
Start: 2018-08-07 | End: 2018-08-10

## 2018-08-07 RX ORDER — GENTAMICIN SULFATE 40 MG/ML
160 VIAL (ML) INJECTION ONCE
Qty: 0 | Refills: 0 | Status: COMPLETED | OUTPATIENT
Start: 2018-08-07 | End: 2018-08-07

## 2018-08-07 RX ORDER — OXYTOCIN 10 UNIT/ML
41.67 VIAL (ML) INJECTION
Qty: 20 | Refills: 0 | Status: DISCONTINUED | OUTPATIENT
Start: 2018-08-07 | End: 2018-08-10

## 2018-08-07 RX ORDER — HYDROMORPHONE HYDROCHLORIDE 2 MG/ML
0.5 INJECTION INTRAMUSCULAR; INTRAVENOUS; SUBCUTANEOUS
Qty: 0 | Refills: 0 | Status: DISCONTINUED | OUTPATIENT
Start: 2018-08-07 | End: 2018-08-09

## 2018-08-07 RX ORDER — SODIUM CHLORIDE 9 MG/ML
1000 INJECTION, SOLUTION INTRAVENOUS
Qty: 0 | Refills: 0 | Status: DISCONTINUED | OUTPATIENT
Start: 2018-08-07 | End: 2018-08-10

## 2018-08-07 RX ORDER — OXYCODONE HYDROCHLORIDE 5 MG/1
5 TABLET ORAL EVERY 4 HOURS
Qty: 0 | Refills: 0 | Status: COMPLETED | OUTPATIENT
Start: 2018-08-07 | End: 2018-08-14

## 2018-08-07 RX ORDER — DOCUSATE SODIUM 100 MG
100 CAPSULE ORAL
Qty: 0 | Refills: 0 | Status: DISCONTINUED | OUTPATIENT
Start: 2018-08-07 | End: 2018-08-10

## 2018-08-07 RX ORDER — HEPARIN SODIUM 5000 [USP'U]/ML
5000 INJECTION INTRAVENOUS; SUBCUTANEOUS EVERY 12 HOURS
Qty: 0 | Refills: 0 | Status: DISCONTINUED | OUTPATIENT
Start: 2018-08-07 | End: 2018-08-10

## 2018-08-07 RX ORDER — GENTAMICIN SULFATE 40 MG/ML
VIAL (ML) INJECTION
Qty: 0 | Refills: 0 | Status: DISCONTINUED | OUTPATIENT
Start: 2018-08-07 | End: 2018-08-08

## 2018-08-07 RX ORDER — HYDROMORPHONE HYDROCHLORIDE 2 MG/ML
30 INJECTION INTRAMUSCULAR; INTRAVENOUS; SUBCUTANEOUS
Qty: 0 | Refills: 0 | Status: DISCONTINUED | OUTPATIENT
Start: 2018-08-07 | End: 2018-08-09

## 2018-08-07 RX ORDER — ONDANSETRON 8 MG/1
4 TABLET, FILM COATED ORAL EVERY 6 HOURS
Qty: 0 | Refills: 0 | Status: DISCONTINUED | OUTPATIENT
Start: 2018-08-07 | End: 2018-08-09

## 2018-08-07 RX ORDER — GENTAMICIN SULFATE 40 MG/ML
120 VIAL (ML) INJECTION EVERY 8 HOURS
Qty: 0 | Refills: 0 | Status: DISCONTINUED | OUTPATIENT
Start: 2018-08-07 | End: 2018-08-08

## 2018-08-07 RX ORDER — KETOROLAC TROMETHAMINE 30 MG/ML
30 SYRINGE (ML) INJECTION EVERY 6 HOURS
Qty: 0 | Refills: 0 | Status: DISCONTINUED | OUTPATIENT
Start: 2018-08-07 | End: 2018-08-08

## 2018-08-07 RX ORDER — IBUPROFEN 200 MG
600 TABLET ORAL EVERY 6 HOURS
Qty: 0 | Refills: 0 | Status: DISCONTINUED | OUTPATIENT
Start: 2018-08-07 | End: 2018-08-10

## 2018-08-07 RX ORDER — DIPHENHYDRAMINE HCL 50 MG
25 CAPSULE ORAL EVERY 6 HOURS
Qty: 0 | Refills: 0 | Status: DISCONTINUED | OUTPATIENT
Start: 2018-08-07 | End: 2018-08-10

## 2018-08-07 RX ORDER — SIMETHICONE 80 MG/1
80 TABLET, CHEWABLE ORAL EVERY 4 HOURS
Qty: 0 | Refills: 0 | Status: DISCONTINUED | OUTPATIENT
Start: 2018-08-07 | End: 2018-08-10

## 2018-08-07 RX ORDER — OXYCODONE HYDROCHLORIDE 5 MG/1
5 TABLET ORAL
Qty: 0 | Refills: 0 | Status: COMPLETED | OUTPATIENT
Start: 2018-08-07 | End: 2018-08-14

## 2018-08-07 RX ADMIN — HEPARIN SODIUM 5000 UNIT(S): 5000 INJECTION INTRAVENOUS; SUBCUTANEOUS at 18:36

## 2018-08-07 RX ADMIN — Medication 200 MILLIGRAM(S): at 05:45

## 2018-08-07 RX ADMIN — Medication 200 MILLIGRAM(S): at 15:37

## 2018-08-07 RX ADMIN — Medication 100 MILLIGRAM(S): at 13:26

## 2018-08-07 RX ADMIN — Medication 0.2 MILLIGRAM(S): at 19:55

## 2018-08-07 RX ADMIN — HYDROMORPHONE HYDROCHLORIDE 30 MILLILITER(S): 2 INJECTION INTRAMUSCULAR; INTRAVENOUS; SUBCUTANEOUS at 07:12

## 2018-08-07 RX ADMIN — Medication 200 MILLIGRAM(S): at 23:44

## 2018-08-07 RX ADMIN — Medication 216 GRAM(S): at 01:00

## 2018-08-07 RX ADMIN — HYDROMORPHONE HYDROCHLORIDE 30 MILLILITER(S): 2 INJECTION INTRAMUSCULAR; INTRAVENOUS; SUBCUTANEOUS at 04:37

## 2018-08-07 RX ADMIN — Medication 1 TABLET(S): at 13:18

## 2018-08-07 RX ADMIN — Medication 30 MILLIGRAM(S): at 22:45

## 2018-08-07 RX ADMIN — Medication 0.2 MILLIGRAM(S): at 05:49

## 2018-08-07 RX ADMIN — Medication 30 MILLIGRAM(S): at 22:15

## 2018-08-07 RX ADMIN — Medication 975 MILLIGRAM(S): at 19:05

## 2018-08-07 RX ADMIN — Medication 975 MILLIGRAM(S): at 18:36

## 2018-08-07 RX ADMIN — Medication 0.2 MILLIGRAM(S): at 15:38

## 2018-08-07 RX ADMIN — Medication 0.2 MILLIGRAM(S): at 10:04

## 2018-08-07 RX ADMIN — Medication 100 MILLIGRAM(S): at 04:58

## 2018-08-07 RX ADMIN — Medication 325 MILLIGRAM(S): at 13:18

## 2018-08-07 RX ADMIN — Medication 100 MILLIGRAM(S): at 22:06

## 2018-08-07 RX ADMIN — Medication 0.2 MILLIGRAM(S): at 23:44

## 2018-08-07 NOTE — PROVIDER CONTACT NOTE (OTHER) - ACTION/TREATMENT ORDERED:
Methergine 0.25 mg to be given IM Now, and then PO series to follow. Continue to monitor bleeding [id number] until stable.

## 2018-08-07 NOTE — PROVIDER CONTACT NOTE (OTHER) - ASSESSMENT
Uterus firm with fundal massage. Moderate bleeding. Vital signs stable. Pt a&o x4. Urine output adequate. Pt denies pain.

## 2018-08-07 NOTE — PROVIDER CONTACT NOTE (OTHER) - BACKGROUND
Pt is s/p primary c/s at 0301. Pt is  34.1 weeks GA with PPROM/PTL. Pt received 0.25mg intrauterine methergine in the OR for uterine atony. Recovery was started at 0410.

## 2018-08-08 LAB
BASOPHILS # BLD AUTO: 0 K/UL — SIGNIFICANT CHANGE UP (ref 0–0.2)
BASOPHILS NFR BLD AUTO: 0.1 % — SIGNIFICANT CHANGE UP (ref 0–2)
EOSINOPHIL # BLD AUTO: 0.1 K/UL — SIGNIFICANT CHANGE UP (ref 0–0.5)
EOSINOPHIL NFR BLD AUTO: 0.5 % — SIGNIFICANT CHANGE UP (ref 0–6)
HCT VFR BLD CALC: 31.7 % — LOW (ref 34.5–45)
HGB BLD-MCNC: 11.6 G/DL — SIGNIFICANT CHANGE UP (ref 11.5–15.5)
LYMPHOCYTES # BLD AUTO: 18.2 % — SIGNIFICANT CHANGE UP (ref 13–44)
LYMPHOCYTES # BLD AUTO: 2.4 K/UL — SIGNIFICANT CHANGE UP (ref 1–3.3)
MCHC RBC-ENTMCNC: 36.2 PG — HIGH (ref 27–34)
MCHC RBC-ENTMCNC: 36.7 GM/DL — HIGH (ref 32–36)
MCV RBC AUTO: 98.5 FL — SIGNIFICANT CHANGE UP (ref 80–100)
MONOCYTES # BLD AUTO: 1.2 K/UL — HIGH (ref 0–0.9)
MONOCYTES NFR BLD AUTO: 9.2 % — SIGNIFICANT CHANGE UP (ref 2–14)
NEUTROPHILS # BLD AUTO: 9.5 K/UL — HIGH (ref 1.8–7.4)
NEUTROPHILS NFR BLD AUTO: 72 % — SIGNIFICANT CHANGE UP (ref 43–77)
PLATELET # BLD AUTO: 137 K/UL — LOW (ref 150–400)
RBC # BLD: 3.22 M/UL — LOW (ref 3.8–5.2)
RBC # FLD: 12.3 % — SIGNIFICANT CHANGE UP (ref 10.3–14.5)
WBC # BLD: 13.1 K/UL — HIGH (ref 3.8–10.5)
WBC # FLD AUTO: 13.1 K/UL — HIGH (ref 3.8–10.5)

## 2018-08-08 RX ADMIN — Medication 30 MILLIGRAM(S): at 04:30

## 2018-08-08 RX ADMIN — Medication 975 MILLIGRAM(S): at 17:53

## 2018-08-08 RX ADMIN — Medication 30 MILLIGRAM(S): at 11:16

## 2018-08-08 RX ADMIN — Medication 30 MILLIGRAM(S): at 11:46

## 2018-08-08 RX ADMIN — Medication 1 TABLET(S): at 11:22

## 2018-08-08 RX ADMIN — Medication 30 MILLIGRAM(S): at 17:23

## 2018-08-08 RX ADMIN — Medication 0.2 MILLIGRAM(S): at 04:30

## 2018-08-08 RX ADMIN — HEPARIN SODIUM 5000 UNIT(S): 5000 INJECTION INTRAVENOUS; SUBCUTANEOUS at 17:22

## 2018-08-08 RX ADMIN — Medication 30 MILLIGRAM(S): at 23:32

## 2018-08-08 RX ADMIN — Medication 975 MILLIGRAM(S): at 17:23

## 2018-08-08 RX ADMIN — Medication 325 MILLIGRAM(S): at 11:23

## 2018-08-08 RX ADMIN — Medication 30 MILLIGRAM(S): at 05:00

## 2018-08-08 RX ADMIN — Medication 30 MILLIGRAM(S): at 17:53

## 2018-08-08 RX ADMIN — Medication 0.2 MILLIGRAM(S): at 08:11

## 2018-08-08 RX ADMIN — Medication 975 MILLIGRAM(S): at 23:32

## 2018-08-08 RX ADMIN — HEPARIN SODIUM 5000 UNIT(S): 5000 INJECTION INTRAVENOUS; SUBCUTANEOUS at 06:20

## 2018-08-08 NOTE — PROGRESS NOTE ADULT - SUBJECTIVE AND OBJECTIVE BOX
Day 1 of Anesthesia Pain Management Service    SUBJECTIVE: Patient is doing well with IV PCA    Pain Scale Score:	[X] Refer to charted pain scores    THERAPY:    [ ] IV PCA Morphine		[ ] 5 mg/mL	[ ] 1 mg/mL  [X] IV PCA Hydromorphone	[ ] 5 mg/mL	[X] 1 mg/mL  [ ] IV PCA Fentanyl		[ ] 50 micrograms/mL    Demand dose: 0.2 mg     Lockout: 6 minutes   Continuous Rate: 0 mg/hr  4 Hour Limit: 4 mg    MEDICATIONS  (STANDING):  acetaminophen   Tablet. 975 milliGRAM(s) Oral every 6 hours  betamethasone Injectable 12 milliGRAM(s) IntraMuscular every 24 hours  diphtheria/tetanus/pertussis (acellular) Vaccine (ADAcel) 0.5 milliLiter(s) IntraMuscular once  ferrous    sulfate 325 milliGRAM(s) Oral daily  heparin  Injectable 5000 Unit(s) SubCutaneous every 12 hours  HYDROmorphone PCA (1 mG/mL) 30 milliLiter(s) PCA Continuous PCA Continuous  ibuprofen  Tablet 600 milliGRAM(s) Oral every 6 hours  ketorolac   Injectable 30 milliGRAM(s) IV Push every 6 hours  lactated ringers. 1000 milliLiter(s) (125 mL/Hr) IV Continuous <Continuous>  oxyCODONE    IR 5 milliGRAM(s) Oral every 3 hours  oxytocin Infusion 41.667 milliUNIT(s)/Min (125 mL/Hr) IV Continuous <Continuous>  prenatal multivitamin 1 Tablet(s) Oral daily    MEDICATIONS  (PRN):  diphenhydrAMINE   Capsule 25 milliGRAM(s) Oral every 6 hours PRN Itching  docusate sodium 100 milliGRAM(s) Oral two times a day PRN Stool Softening  glycerin Suppository - Adult 1 Suppository(s) Rectal at bedtime PRN Constipation  HYDROmorphone PCA (1 mG/mL) Rescue Clinician Bolus 0.5 milliGRAM(s) IV Push every 15 minutes PRN for Pain Scale GREATER THAN 6  lanolin Ointment 1 Application(s) Topical every 3 hours PRN Sore Nipples  naloxone Injectable 0.1 milliGRAM(s) IV Push every 3 minutes PRN For ANY of the following changes in patient status:  A. RR LESS THAN 10 breaths per minute, B. Oxygen saturation LESS THAN 90%, C. Sedation score of 6  ondansetron Injectable 4 milliGRAM(s) IV Push every 6 hours PRN Nausea  oxyCODONE    IR 5 milliGRAM(s) Oral every 4 hours PRN Severe Pain (7 - 10)  simethicone 80 milliGRAM(s) Chew every 4 hours PRN Gas      OBJECTIVE:    Sedation Score:	[ X] Alert	[ ] Drowsy 	[ ] Arousable	[ ] Asleep	[ ] Unresponsive    Side Effects:	[X ] None	[ ] Nausea	[ ] Vomiting	[ ] Pruritus  		[ ] Other:    Vital Signs Last 24 Hrs  T(C): 36.7 (08 Aug 2018 09:19), Max: 36.9 (07 Aug 2018 13:30)  T(F): 98.1 (08 Aug 2018 09:19), Max: 98.5 (07 Aug 2018 13:30)  HR: 76 (08 Aug 2018 09:19) (73 - 78)  BP: 92/62 (08 Aug 2018 09:19) (90/61 - 93/63)  BP(mean): --  RR: 18 (08 Aug 2018 09:19) (18 - 18)  SpO2: 99% (08 Aug 2018 09:19) (97% - 99%)    ASSESSMENT/ PLAN    Therapy to  be:               [X] Continued   [ ] Discontinued   [ ] Changed to PRN Analgesics    Documentation and Verification of current medications:   [X] Done	[ ] Not done, not eligible    Comments:

## 2018-08-08 NOTE — PROGRESS NOTE ADULT - SUBJECTIVE AND OBJECTIVE BOX
OB Progress Note:  Delivery, POD#1    S: 33y POD#1 s/p LTCS . No acute overnight events. No acute complaints, pain is well controlled. She is tolerating a regular diet and passing flatus. Denies N/V, CP, SOB, lightheadedness, dizziness.   Ambulating without difficulty  Patient is voiding spontaneously.  Patient is breastfeeding.    O:   Vital Signs Last 24 Hrs  T(C): 36.8 (08 Aug 2018 00:39), Max: 36.9 (07 Aug 2018 13:30)  T(F): 98.2 (08 Aug 2018 00:39), Max: 98.5 (07 Aug 2018 13:30)  HR: 73 (08 Aug 2018 00:39) (73 - 79)  BP: 93/63 (08 Aug 2018 00:39) (90/61 - 101/66)  BP(mean): --  RR: 18 (08 Aug 2018 00:39) (18 - 18)  SpO2: 97% (07 Aug 2018 21:40) (97% - 98%)    18 @ 07:01  -  18 @ 07:00  --------------------------------------------------------  IN: 0.05 mL/kg/hr / OUT: 0.61 mL/kg/hr / NET: -0.56 mL/kg/hr        PE:  General: NAD  Pulm: Clear to auscultation bilaterally  Cardio: Regular rate and normal rhythm  Abdomen: Mildly distended, appropriately tender, firm uterine fundus   Incision: clean, dry, intact w/ steri strips vs staples  Extremities: No erythema, no pitting edema, non-tender    Labs:  Blood Type: O Positive  Antibody Screen: Negative       RPR: Negative                            13.5   10.0  )-----------( 169      ( 06 Aug 2018 22:56 )             39.7             Urinalysis Basic - ( 06 Aug 2018 22:56 )    Color: Yellow / Appearance: SL Turbid / S.018 / pH: x  Gluc: x / Ketone: Negative  / Bili: Negative / Urobili: Negative   Blood: x / Protein: Trace / Nitrite: Negative   Leuk Esterase: Moderate / RBC: Negative /HPF / WBC 2-5 /HPF   Sq Epi: x / Non Sq Epi: Occasional /HPF / Bacteria: x        Medications:  MEDICATIONS  (STANDING):  acetaminophen   Tablet. 975 milliGRAM(s) Oral every 6 hours  betamethasone Injectable 12 milliGRAM(s) IntraMuscular every 24 hours  diphtheria/tetanus/pertussis (acellular) Vaccine (ADAcel) 0.5 milliLiter(s) IntraMuscular once  ferrous    sulfate 325 milliGRAM(s) Oral daily  heparin  Injectable 5000 Unit(s) SubCutaneous every 12 hours  HYDROmorphone PCA (1 mG/mL) 30 milliLiter(s) PCA Continuous PCA Continuous  ibuprofen  Tablet 600 milliGRAM(s) Oral every 6 hours  ketorolac   Injectable 30 milliGRAM(s) IV Push every 6 hours  lactated ringers. 1000 milliLiter(s) (125 mL/Hr) IV Continuous <Continuous>  methylergonovine 0.2 milliGRAM(s) Oral every 4 hours  oxyCODONE    IR 5 milliGRAM(s) Oral every 3 hours  oxytocin Infusion 41.667 milliUNIT(s)/Min (125 mL/Hr) IV Continuous <Continuous>  prenatal multivitamin 1 Tablet(s) Oral daily    MEDICATIONS  (PRN):  diphenhydrAMINE   Capsule 25 milliGRAM(s) Oral every 6 hours PRN Itching  docusate sodium 100 milliGRAM(s) Oral two times a day PRN Stool Softening  glycerin Suppository - Adult 1 Suppository(s) Rectal at bedtime PRN Constipation  HYDROmorphone PCA (1 mG/mL) Rescue Clinician Bolus 0.5 milliGRAM(s) IV Push every 15 minutes PRN for Pain Scale GREATER THAN 6  lanolin Ointment 1 Application(s) Topical every 3 hours PRN Sore Nipples  naloxone Injectable 0.1 milliGRAM(s) IV Push every 3 minutes PRN For ANY of the following changes in patient status:  A. RR LESS THAN 10 breaths per minute, B. Oxygen saturation LESS THAN 90%, C. Sedation score of 6  ondansetron Injectable 4 milliGRAM(s) IV Push every 6 hours PRN Nausea  oxyCODONE    IR 5 milliGRAM(s) Oral every 4 hours PRN Severe Pain (7 - 10)  simethicone 80 milliGRAM(s) Chew every 4 hours PRN Gas        Maria L K. Lisseth, PGY-1 OB Progress Note:  Delivery, POD#1    S: 33y POD#1 s/p LTCS . No acute overnight events. No acute complaints, pain is well controlled. She is tolerating a regular diet and passing flatus. Denies N/V, CP, SOB, lightheadedness, dizziness.   Ambulating without difficulty  Patient is voiding spontaneously.  Patient is breastfeeding.    O:   Vital Signs Last 24 Hrs  T(C): 36.8 (08 Aug 2018 00:39), Max: 36.9 (07 Aug 2018 13:30)  T(F): 98.2 (08 Aug 2018 00:39), Max: 98.5 (07 Aug 2018 13:30)  HR: 73 (08 Aug 2018 00:39) (73 - 79)  BP: 93/63 (08 Aug 2018 00:39) (90/61 - 101/66)  BP(mean): --  RR: 18 (08 Aug 2018 00:39) (18 - 18)  SpO2: 97% (07 Aug 2018 21:40) (97% - 98%)    18 @ 07:01  -  18 @ 07:00  --------------------------------------------------------  IN: 0.05 mL/kg/hr / OUT: 0.61 mL/kg/hr / NET: -0.56 mL/kg/hr        PE:  General: NAD  Pulm: Clear to auscultation bilaterally  Cardio: Regular rate and normal rhythm  Abdomen: Mildly distended, appropriately tender, firm uterine fundus   Incision: clean, dry, intact  Extremities: No erythema, no pitting edema, non-tender    Labs:  Blood Type: O Positive  Antibody Screen: Negative       RPR: Negative                            13.5   10.0  )-----------( 169      ( 06 Aug 2018 22:56 )             39.7             Urinalysis Basic - ( 06 Aug 2018 22:56 )    Color: Yellow / Appearance: SL Turbid / S.018 / pH: x  Gluc: x / Ketone: Negative  / Bili: Negative / Urobili: Negative   Blood: x / Protein: Trace / Nitrite: Negative   Leuk Esterase: Moderate / RBC: Negative /HPF / WBC 2-5 /HPF   Sq Epi: x / Non Sq Epi: Occasional /HPF / Bacteria: x        Medications:  MEDICATIONS  (STANDING):  acetaminophen   Tablet. 975 milliGRAM(s) Oral every 6 hours  betamethasone Injectable 12 milliGRAM(s) IntraMuscular every 24 hours  diphtheria/tetanus/pertussis (acellular) Vaccine (ADAcel) 0.5 milliLiter(s) IntraMuscular once  ferrous    sulfate 325 milliGRAM(s) Oral daily  heparin  Injectable 5000 Unit(s) SubCutaneous every 12 hours  HYDROmorphone PCA (1 mG/mL) 30 milliLiter(s) PCA Continuous PCA Continuous  ibuprofen  Tablet 600 milliGRAM(s) Oral every 6 hours  ketorolac   Injectable 30 milliGRAM(s) IV Push every 6 hours  lactated ringers. 1000 milliLiter(s) (125 mL/Hr) IV Continuous <Continuous>  methylergonovine 0.2 milliGRAM(s) Oral every 4 hours  oxyCODONE    IR 5 milliGRAM(s) Oral every 3 hours  oxytocin Infusion 41.667 milliUNIT(s)/Min (125 mL/Hr) IV Continuous <Continuous>  prenatal multivitamin 1 Tablet(s) Oral daily    MEDICATIONS  (PRN):  diphenhydrAMINE   Capsule 25 milliGRAM(s) Oral every 6 hours PRN Itching  docusate sodium 100 milliGRAM(s) Oral two times a day PRN Stool Softening  glycerin Suppository - Adult 1 Suppository(s) Rectal at bedtime PRN Constipation  HYDROmorphone PCA (1 mG/mL) Rescue Clinician Bolus 0.5 milliGRAM(s) IV Push every 15 minutes PRN for Pain Scale GREATER THAN 6  lanolin Ointment 1 Application(s) Topical every 3 hours PRN Sore Nipples  naloxone Injectable 0.1 milliGRAM(s) IV Push every 3 minutes PRN For ANY of the following changes in patient status:  A. RR LESS THAN 10 breaths per minute, B. Oxygen saturation LESS THAN 90%, C. Sedation score of 6  ondansetron Injectable 4 milliGRAM(s) IV Push every 6 hours PRN Nausea  oxyCODONE    IR 5 milliGRAM(s) Oral every 4 hours PRN Severe Pain (7 - 10)  simethicone 80 milliGRAM(s) Chew every 4 hours PRN Gas        Maria L Montanez, PGY-1

## 2018-08-09 RX ORDER — OXYCODONE HYDROCHLORIDE 5 MG/1
5 TABLET ORAL
Qty: 0 | Refills: 0 | Status: DISCONTINUED | OUTPATIENT
Start: 2018-08-09 | End: 2018-08-10

## 2018-08-09 RX ORDER — KETOROLAC TROMETHAMINE 30 MG/ML
30 SYRINGE (ML) INJECTION ONCE
Qty: 0 | Refills: 0 | Status: DISCONTINUED | OUTPATIENT
Start: 2018-08-09 | End: 2018-08-09

## 2018-08-09 RX ORDER — OXYCODONE HYDROCHLORIDE 5 MG/1
5 TABLET ORAL EVERY 4 HOURS
Qty: 0 | Refills: 0 | Status: DISCONTINUED | OUTPATIENT
Start: 2018-08-09 | End: 2018-08-10

## 2018-08-09 RX ADMIN — Medication 975 MILLIGRAM(S): at 05:41

## 2018-08-09 RX ADMIN — Medication 975 MILLIGRAM(S): at 17:22

## 2018-08-09 RX ADMIN — Medication 1 TABLET(S): at 19:07

## 2018-08-09 RX ADMIN — Medication 100 MILLIGRAM(S): at 08:13

## 2018-08-09 RX ADMIN — OXYCODONE HYDROCHLORIDE 5 MILLIGRAM(S): 5 TABLET ORAL at 08:43

## 2018-08-09 RX ADMIN — SIMETHICONE 80 MILLIGRAM(S): 80 TABLET, CHEWABLE ORAL at 08:13

## 2018-08-09 RX ADMIN — Medication 975 MILLIGRAM(S): at 16:52

## 2018-08-09 RX ADMIN — Medication 30 MILLIGRAM(S): at 05:38

## 2018-08-09 RX ADMIN — OXYCODONE HYDROCHLORIDE 5 MILLIGRAM(S): 5 TABLET ORAL at 08:13

## 2018-08-09 RX ADMIN — HEPARIN SODIUM 5000 UNIT(S): 5000 INJECTION INTRAVENOUS; SUBCUTANEOUS at 05:39

## 2018-08-09 RX ADMIN — Medication 30 MILLIGRAM(S): at 00:15

## 2018-08-09 RX ADMIN — Medication 975 MILLIGRAM(S): at 00:15

## 2018-08-09 RX ADMIN — HEPARIN SODIUM 5000 UNIT(S): 5000 INJECTION INTRAVENOUS; SUBCUTANEOUS at 19:48

## 2018-08-09 NOTE — PROGRESS NOTE ADULT - SUBJECTIVE AND OBJECTIVE BOX
Day 2 of Anesthesia Pain Management Service    SUBJECTIVE: Patient is doing well with IV PCA    Pain Scale Score:	[X] Refer to charted pain scores    THERAPY:    [ ] IV PCA Morphine		[ ] 5 mg/mL	[ ] 1 mg/mL  [X] IV PCA Hydromorphone	[ ] 5 mg/mL	[X] 1 mg/mL  [ ] IV PCA Fentanyl		[ ] 50 micrograms/mL    Demand dose: 0.2 mg     Lockout: 6 minutes   Continuous Rate: 0 mg/hr  4 Hour Limit: 4 mg    MEDICATIONS  (STANDING):  acetaminophen   Tablet. 975 milliGRAM(s) Oral every 6 hours  betamethasone Injectable 12 milliGRAM(s) IntraMuscular every 24 hours  diphtheria/tetanus/pertussis (acellular) Vaccine (ADAcel) 0.5 milliLiter(s) IntraMuscular once  ferrous    sulfate 325 milliGRAM(s) Oral daily  heparin  Injectable 5000 Unit(s) SubCutaneous every 12 hours  HYDROmorphone PCA (1 mG/mL) 30 milliLiter(s) PCA Continuous PCA Continuous  ibuprofen  Tablet 600 milliGRAM(s) Oral every 6 hours  lactated ringers. 1000 milliLiter(s) (125 mL/Hr) IV Continuous <Continuous>  oxyCODONE    IR 5 milliGRAM(s) Oral every 3 hours  oxytocin Infusion 41.667 milliUNIT(s)/Min (125 mL/Hr) IV Continuous <Continuous>  prenatal multivitamin 1 Tablet(s) Oral daily    MEDICATIONS  (PRN):  diphenhydrAMINE   Capsule 25 milliGRAM(s) Oral every 6 hours PRN Itching  docusate sodium 100 milliGRAM(s) Oral two times a day PRN Stool Softening  glycerin Suppository - Adult 1 Suppository(s) Rectal at bedtime PRN Constipation  HYDROmorphone PCA (1 mG/mL) Rescue Clinician Bolus 0.5 milliGRAM(s) IV Push every 15 minutes PRN for Pain Scale GREATER THAN 6  lanolin Ointment 1 Application(s) Topical every 3 hours PRN Sore Nipples  naloxone Injectable 0.1 milliGRAM(s) IV Push every 3 minutes PRN For ANY of the following changes in patient status:  A. RR LESS THAN 10 breaths per minute, B. Oxygen saturation LESS THAN 90%, C. Sedation score of 6  ondansetron Injectable 4 milliGRAM(s) IV Push every 6 hours PRN Nausea  oxyCODONE    IR 5 milliGRAM(s) Oral every 4 hours PRN Severe Pain (7 - 10)  simethicone 80 milliGRAM(s) Chew every 4 hours PRN Gas      OBJECTIVE:    Sedation Score:	[ X] Alert	[ ] Drowsy 	[ ] Arousable	[ ] Asleep	[ ] Unresponsive    Side Effects:	[X ] None	[ ] Nausea	[ ] Vomiting	[ ] Pruritus  		[ ] Other:    Vital Signs Last 24 Hrs  T(C): 36.9 (09 Aug 2018 10:00), Max: 36.9 (09 Aug 2018 05:00)  T(F): 98.5 (09 Aug 2018 10:00), Max: 98.5 (09 Aug 2018 10:00)  HR: 74 (09 Aug 2018 10:00) (70 - 79)  BP: 100/74 (09 Aug 2018 10:00) (90/60 - 119/69)  BP(mean): --  RR: 18 (09 Aug 2018 10:00) (17 - 18)  SpO2: 98% (09 Aug 2018 10:00) (98% - 98%)    ASSESSMENT/ PLAN    Therapy to  be:               [X] Continued   [ ] Discontinued   [ ] Changed to PRN Analgesics    Documentation and Verification of current medications:   [X] Done	[ ] Not done, not eligible    Comments:

## 2018-08-09 NOTE — PROGRESS NOTE ADULT - SUBJECTIVE AND OBJECTIVE BOX
OB Progress Note: LTCS, POD#2    S: 32yo POD#2 s/p LTCS. Pain is well controlled. She is tolerating a regular diet and passing flatus. She is voiding spontaneously, and ambulating without difficulty. Denies CP/SOB. Denies lightheadedness/dizziness. Denies N/V. Denies heavy vaginal bleeding.    O:  Vitals:  Vital Signs Last 24 Hrs  T(C): 36.9 (09 Aug 2018 05:00), Max: 36.9 (09 Aug 2018 05:00)  T(F): 98.4 (09 Aug 2018 05:00), Max: 98.4 (09 Aug 2018 05:00)  HR: 75 (09 Aug 2018 05:00) (70 - 79)  BP: 96/60 (09 Aug 2018 05:00) (90/60 - 119/69)  BP(mean): --  RR: 18 (09 Aug 2018 05:00) (17 - 18)  SpO2: 98% (09 Aug 2018 00:30) (98% - 99%)    MEDICATIONS  (STANDING):  acetaminophen   Tablet. 975 milliGRAM(s) Oral every 6 hours  betamethasone Injectable 12 milliGRAM(s) IntraMuscular every 24 hours  diphtheria/tetanus/pertussis (acellular) Vaccine (ADAcel) 0.5 milliLiter(s) IntraMuscular once  ferrous    sulfate 325 milliGRAM(s) Oral daily  heparin  Injectable 5000 Unit(s) SubCutaneous every 12 hours  HYDROmorphone PCA (1 mG/mL) 30 milliLiter(s) PCA Continuous PCA Continuous  ibuprofen  Tablet 600 milliGRAM(s) Oral every 6 hours  lactated ringers. 1000 milliLiter(s) (125 mL/Hr) IV Continuous <Continuous>  oxyCODONE    IR 5 milliGRAM(s) Oral every 3 hours  oxytocin Infusion 41.667 milliUNIT(s)/Min (125 mL/Hr) IV Continuous <Continuous>  prenatal multivitamin 1 Tablet(s) Oral daily      MEDICATIONS  (PRN):  diphenhydrAMINE   Capsule 25 milliGRAM(s) Oral every 6 hours PRN Itching  docusate sodium 100 milliGRAM(s) Oral two times a day PRN Stool Softening  glycerin Suppository - Adult 1 Suppository(s) Rectal at bedtime PRN Constipation  HYDROmorphone PCA (1 mG/mL) Rescue Clinician Bolus 0.5 milliGRAM(s) IV Push every 15 minutes PRN for Pain Scale GREATER THAN 6  lanolin Ointment 1 Application(s) Topical every 3 hours PRN Sore Nipples  naloxone Injectable 0.1 milliGRAM(s) IV Push every 3 minutes PRN For ANY of the following changes in patient status:  A. RR LESS THAN 10 breaths per minute, B. Oxygen saturation LESS THAN 90%, C. Sedation score of 6  ondansetron Injectable 4 milliGRAM(s) IV Push every 6 hours PRN Nausea  oxyCODONE    IR 5 milliGRAM(s) Oral every 4 hours PRN Severe Pain (7 - 10)  simethicone 80 milliGRAM(s) Chew every 4 hours PRN Gas      Labs:  Blood type: O Positive  Rubella IgG: RPR: Negative                          11.6   13.1<H> >-----------< 137<L>    ( 08-08 @ 07:56 )             31.7<L>                        13.5   10.0 >-----------< 169    ( 08-06 @ 22:56 )             39.7    PE:  General: NAD  Abdomen: Soft, appropriately tender, incision c/d/i.  Extremities: No erythema, no pitting edema

## 2018-08-10 ENCOUNTER — TRANSCRIPTION ENCOUNTER (OUTPATIENT)
Age: 33
End: 2018-08-10

## 2018-08-10 VITALS
OXYGEN SATURATION: 100 % | TEMPERATURE: 98 F | RESPIRATION RATE: 18 BRPM | SYSTOLIC BLOOD PRESSURE: 120 MMHG | DIASTOLIC BLOOD PRESSURE: 70 MMHG | HEART RATE: 72 BPM

## 2018-08-10 LAB
BASOPHILS # BLD AUTO: 0.1 K/UL — SIGNIFICANT CHANGE UP (ref 0–0.2)
BASOPHILS NFR BLD AUTO: 0.7 % — SIGNIFICANT CHANGE UP (ref 0–2)
EOSINOPHIL # BLD AUTO: 0.3 K/UL — SIGNIFICANT CHANGE UP (ref 0–0.5)
EOSINOPHIL NFR BLD AUTO: 2.8 % — SIGNIFICANT CHANGE UP (ref 0–6)
HCT VFR BLD CALC: 31.5 % — LOW (ref 34.5–45)
HGB BLD-MCNC: 10.8 G/DL — LOW (ref 11.5–15.5)
LYMPHOCYTES # BLD AUTO: 2.8 K/UL — SIGNIFICANT CHANGE UP (ref 1–3.3)
LYMPHOCYTES # BLD AUTO: 27.4 % — SIGNIFICANT CHANGE UP (ref 13–44)
MCHC RBC-ENTMCNC: 34.3 GM/DL — SIGNIFICANT CHANGE UP (ref 32–36)
MCHC RBC-ENTMCNC: 34.5 PG — HIGH (ref 27–34)
MCV RBC AUTO: 101 FL — HIGH (ref 80–100)
MONOCYTES # BLD AUTO: 0.8 K/UL — SIGNIFICANT CHANGE UP (ref 0–0.9)
MONOCYTES NFR BLD AUTO: 7.5 % — SIGNIFICANT CHANGE UP (ref 2–14)
NEUTROPHILS # BLD AUTO: 6.2 K/UL — SIGNIFICANT CHANGE UP (ref 1.8–7.4)
NEUTROPHILS NFR BLD AUTO: 61.6 % — SIGNIFICANT CHANGE UP (ref 43–77)
PLATELET # BLD AUTO: 164 K/UL — SIGNIFICANT CHANGE UP (ref 150–400)
RBC # BLD: 3.13 M/UL — LOW (ref 3.8–5.2)
RBC # FLD: 13 % — SIGNIFICANT CHANGE UP (ref 10.3–14.5)
WBC # BLD: 10.1 K/UL — SIGNIFICANT CHANGE UP (ref 3.8–10.5)
WBC # FLD AUTO: 10.1 K/UL — SIGNIFICANT CHANGE UP (ref 3.8–10.5)

## 2018-08-10 PROCEDURE — G0463: CPT

## 2018-08-10 PROCEDURE — 81001 URINALYSIS AUTO W/SCOPE: CPT

## 2018-08-10 PROCEDURE — 86780 TREPONEMA PALLIDUM: CPT

## 2018-08-10 PROCEDURE — 59025 FETAL NON-STRESS TEST: CPT

## 2018-08-10 PROCEDURE — 59050 FETAL MONITOR W/REPORT: CPT

## 2018-08-10 PROCEDURE — 87070 CULTURE OTHR SPECIMN AEROBIC: CPT

## 2018-08-10 PROCEDURE — 84112 EVAL AMNIOTIC FLUID PROTEIN: CPT

## 2018-08-10 PROCEDURE — 85027 COMPLETE CBC AUTOMATED: CPT

## 2018-08-10 RX ORDER — DOCUSATE SODIUM 100 MG
1 CAPSULE ORAL
Qty: 0 | Refills: 0 | DISCHARGE
Start: 2018-08-10

## 2018-08-10 RX ORDER — IBUPROFEN 200 MG
3 TABLET ORAL
Qty: 0 | Refills: 0 | DISCHARGE
Start: 2018-08-10

## 2018-08-10 RX ORDER — NORETHINDRONE 0.35 MG/1
1 TABLET ORAL
Qty: 28 | Refills: 6
Start: 2018-08-10 | End: 2019-02-21

## 2018-08-10 RX ADMIN — Medication 325 MILLIGRAM(S): at 12:12

## 2018-08-10 RX ADMIN — Medication 1 TABLET(S): at 12:12

## 2018-08-10 RX ADMIN — Medication 975 MILLIGRAM(S): at 05:56

## 2018-08-10 RX ADMIN — Medication 975 MILLIGRAM(S): at 12:11

## 2018-08-10 RX ADMIN — HEPARIN SODIUM 5000 UNIT(S): 5000 INJECTION INTRAVENOUS; SUBCUTANEOUS at 06:06

## 2018-08-10 RX ADMIN — Medication 975 MILLIGRAM(S): at 12:41

## 2018-08-10 NOTE — DISCHARGE NOTE OB - HOSPITAL COURSE
Patient had uncomplicated low transverse  section for breech presentation.  Please see operative note for details.  During postpartum course patient's vitals were stable, vaginal bleeding appropriate, and pain well controlled.  Post operation day one hematocrit was appropriate.  On day of discharge patient was ambulating, her pain controlled with oral medications, had adequate oral intake, and was voiding freely.  Discharge instructions and precautions were given.  Will return to clinic in 2 weeks for incision check.  Postpartum birth control plan is micronor.

## 2018-08-10 NOTE — PROGRESS NOTE ADULT - SUBJECTIVE AND OBJECTIVE BOX
OB Postpartum Note:  Delivery, POD#3    S: 33y POD#3 s/p pLTCS. No acute overnight events. The patient feels well.  Pain is well controlled. She is tolerating a regular diet and passing flatus. She is voiding spontaneously and ambulating without difficulty. Denies CP, SOB, lightheadedness, dizziness. Denies N/V.    O:   Vital Signs Last 24 Hrs  T(C): 36.7 (10 Aug 2018 06:14), Max: 37.1 (09 Aug 2018 17:00)  T(F): 98.1 (10 Aug 2018 06:14), Max: 98.7 (09 Aug 2018 17:00)  HR: 72 (10 Aug 2018 06:14) (72 - 86)  BP: 120/70 (10 Aug 2018 06:14) (99/63 - 120/70)  RR: 18 (10 Aug 2018 06:14) (17 - 18)  SpO2: 100% (10 Aug 2018 06:14) (98% - 100%)    PE:  General: NAD  Pulm: Clear to auscultation bilaterally  Cardio: Regular rate and normal rhythm  Abdomen: Mildly distended, appropriately tender, firm uterine fundus  Incision: clean, dry, intact w/ steri strips  Extremities: No erythema, no pitting edema, non-tender    Labs:  Blood Type: O Positive  Antibody Screen: Negative  RPR: Negative                            10.8   10.1  )-----------( 164      ( 10 Aug 2018 06:02 )             31.5                 Medications:  MEDICATIONS  (STANDING):  acetaminophen   Tablet. 975 milliGRAM(s) Oral every 6 hours  betamethasone Injectable 12 milliGRAM(s) IntraMuscular every 24 hours  diphtheria/tetanus/pertussis (acellular) Vaccine (ADAcel) 0.5 milliLiter(s) IntraMuscular once  ferrous    sulfate 325 milliGRAM(s) Oral daily  heparin  Injectable 5000 Unit(s) SubCutaneous every 12 hours  ibuprofen  Tablet 600 milliGRAM(s) Oral every 6 hours  lactated ringers. 1000 milliLiter(s) (125 mL/Hr) IV Continuous <Continuous>  oxyCODONE    IR 5 milliGRAM(s) Oral every 3 hours  oxytocin Infusion 41.667 milliUNIT(s)/Min (125 mL/Hr) IV Continuous <Continuous>  prenatal multivitamin 1 Tablet(s) Oral daily    MEDICATIONS  (PRN):  diphenhydrAMINE   Capsule 25 milliGRAM(s) Oral every 6 hours PRN Itching  docusate sodium 100 milliGRAM(s) Oral two times a day PRN Stool Softening  glycerin Suppository - Adult 1 Suppository(s) Rectal at bedtime PRN Constipation  lanolin Ointment 1 Application(s) Topical every 3 hours PRN Sore Nipples  oxyCODONE    IR 5 milliGRAM(s) Oral every 4 hours PRN Severe Pain (7 - 10)  simethicone 80 milliGRAM(s) Chew every 4 hours PRN Gas      Maria L Montanez, PGY-1 OB Postpartum Note:  Delivery, POD#3    S: 33y POD#3 s/p pLTCS. No acute overnight events. The patient feels well.  Pain is well controlled. She is tolerating a regular diet and passing flatus. She is voiding spontaneously and ambulating without difficulty. Denies CP, SOB, lightheadedness, dizziness. Denies N/V.    O:   Vital Signs Last 24 Hrs  T(C): 36.7 (10 Aug 2018 06:14), Max: 37.1 (09 Aug 2018 17:00)  T(F): 98.1 (10 Aug 2018 06:14), Max: 98.7 (09 Aug 2018 17:00)  HR: 72 (10 Aug 2018 06:14) (72 - 86)  BP: 120/70 (10 Aug 2018 06:14) (99/63 - 120/70)  RR: 18 (10 Aug 2018 06:14) (17 - 18)  SpO2: 100% (10 Aug 2018 06:14) (98% - 100%)    PE:  General: NAD  Pulm: Clear to auscultation bilaterally  Cardio: Regular rate and normal rhythm  Abdomen: Mildly distended, appropriately tender, firm uterine fundus  Incision: clean, dry, intact   Extremities: No erythema, no pitting edema, non-tender    Labs:  Blood Type: O Positive  Antibody Screen: Negative  RPR: Negative                            10.8   10.1  )-----------( 164      ( 10 Aug 2018 06:02 )             31.5                 Medications:  MEDICATIONS  (STANDING):  acetaminophen   Tablet. 975 milliGRAM(s) Oral every 6 hours  betamethasone Injectable 12 milliGRAM(s) IntraMuscular every 24 hours  diphtheria/tetanus/pertussis (acellular) Vaccine (ADAcel) 0.5 milliLiter(s) IntraMuscular once  ferrous    sulfate 325 milliGRAM(s) Oral daily  heparin  Injectable 5000 Unit(s) SubCutaneous every 12 hours  ibuprofen  Tablet 600 milliGRAM(s) Oral every 6 hours  lactated ringers. 1000 milliLiter(s) (125 mL/Hr) IV Continuous <Continuous>  oxyCODONE    IR 5 milliGRAM(s) Oral every 3 hours  oxytocin Infusion 41.667 milliUNIT(s)/Min (125 mL/Hr) IV Continuous <Continuous>  prenatal multivitamin 1 Tablet(s) Oral daily    MEDICATIONS  (PRN):  diphenhydrAMINE   Capsule 25 milliGRAM(s) Oral every 6 hours PRN Itching  docusate sodium 100 milliGRAM(s) Oral two times a day PRN Stool Softening  glycerin Suppository - Adult 1 Suppository(s) Rectal at bedtime PRN Constipation  lanolin Ointment 1 Application(s) Topical every 3 hours PRN Sore Nipples  oxyCODONE    IR 5 milliGRAM(s) Oral every 4 hours PRN Severe Pain (7 - 10)  simethicone 80 milliGRAM(s) Chew every 4 hours PRN Gas      Maria L Montanez, PGY-1

## 2018-08-10 NOTE — PROGRESS NOTE ADULT - PROBLEM SELECTOR PLAN 1
- Continue motrin, tylenol, oxycodone PRN for pain control.  - Increase ambulation  - Continue regular diet  - Discharge planning  - F/U AM CBC
- Continue regular diet.  - Increase ambulation.  - Continue PCEA for pain.  - F/U AM CBC
- Continue regular diet.  - Increase ambulation.  - Continue motrin, tylenol, oxycodone PRN for pain control.    Rodríguez Miller PGY-1

## 2018-08-10 NOTE — PROGRESS NOTE ADULT - ATTENDING COMMENTS
Patient doing well, no complaints, out of bed.   No HA, CP, SOB  No heavy vaginal bleeding (VB)/normal lochia    ICU Vital Signs Last 24 Hrs  T(C): 36.9 (09 Aug 2018 10:00), Max: 36.9 (09 Aug 2018 05:00)  T(F): 98.5 (09 Aug 2018 10:00), Max: 98.5 (09 Aug 2018 10:00)  HR: 74 (09 Aug 2018 10:00) (70 - 79)  BP: 100/74 (09 Aug 2018 10:00) (90/60 - 119/69)  RR: 18 (09 Aug 2018 10:00) (17 - 18)  SpO2: 98% (09 Aug 2018 10:00) (98% - 98%)    POD # 2 s/p C/S @ 34 wks for PPROM, breech  baby doing well in NICU  Patient seen and evaluated by me. I agree with resident note unless otherwise stated.   Routine postpartum care, regular diet as tolerated, ambulate and pain control as needed.     CHAPARRITA Archuleta MD  Attending
Pt seen and examined.  agree with note above.  pt doing well  routine PP care.  dc home today
Pt w no complaints.  +OOB     +edgar reg  VSS  Cont post op care

## 2018-08-10 NOTE — DISCHARGE NOTE OB - CARE PROVIDER_API CALL
.,   Please follow up with us 6 weeks after your delivery date for vaginal deliveries or 2 weeks after your delivery date for  sections.      Your postpartum visit will be at [address] [place] [address], 2nd floor.    Please schedule an appointment (PHONE #  [phone] )  Phone: (   )    -  Fax: (   )    -

## 2018-08-10 NOTE — DISCHARGE NOTE OB - MATERIALS PROVIDED
[facility] [place] Screening Program/Vaccinations/Shaken Baby Prevention Handout/Bottle Feeding Log/Back To Sleep Handout/Breastfeeding Guide and Packet/  Immunization Record/Breastfeeding Log/Breastfeeding Mother’s Support Group Information/Guide to Postpartum Care/Discharge Medication Information for Patients and Families Pocket Guide/[facility] Hearing Screen Program

## 2018-08-10 NOTE — PROGRESS NOTE ADULT - ASSESSMENT
33y POD#3 s/p LTCS.  Patient is stable and is doing well post-operatively.
33y POD#1 s/p LTCS.  Patient is stable and doing well post-operatively.
A/P: 34yo POD#2 s/p LTCS.  Patient is stable and doing well post-operatively.

## 2018-08-10 NOTE — DISCHARGE NOTE OB - MEDICATION SUMMARY - MEDICATIONS TO TAKE
I will START or STAY ON the medications listed below when I get home from the hospital:    breast pump  -- 1 unit(s) by transdermal patch prn   -- Indication: For  delivery delivered    ibuprofen 200 mg oral tablet  -- 3 tab(s) by mouth every 6 hours, As Needed  -- Indication: For  delivery delivered    Prenatal Multivitamins with Folic Acid 1 mg oral tablet  -- 1 tab(s) by mouth once a day  -- Indication: For  delivery delivered    docusate sodium 100 mg oral capsule  -- 1 cap(s) by mouth 2 times a day, As needed, Stool Softening  -- Indication: For  delivery delivered

## 2018-08-10 NOTE — DISCHARGE NOTE OB - PROVIDER TOKENS
FREE:[LAST:[.],PHONE:[(   )    -],FAX:[(   )    -],ADDRESS:[Please follow up with us 6 weeks after your delivery date for vaginal deliveries or 2 weeks after your delivery date for  sections.      Your postpartum visit will be at [address] [place] [address], 2nd floor.    Please schedule an appointment (PHONE #  [phone] )]]

## 2018-08-10 NOTE — DISCHARGE NOTE OB - PATIENT PORTAL LINK FT
No
You can access the [facility] Patient Portal, offered by [facility], by registering with the following website: [email or web address]

## 2018-08-12 LAB
CULTURE RESULTS: SIGNIFICANT CHANGE UP
CULTURE RESULTS: SIGNIFICANT CHANGE UP
SPECIMEN SOURCE: SIGNIFICANT CHANGE UP
SPECIMEN SOURCE: SIGNIFICANT CHANGE UP

## 2018-08-13 ENCOUNTER — APPOINTMENT (OUTPATIENT)
Dept: MATERNAL FETAL MEDICINE | Facility: CLINIC | Age: 33
End: 2018-08-13

## 2018-08-13 ENCOUNTER — APPOINTMENT (OUTPATIENT)
Dept: ANTEPARTUM | Facility: CLINIC | Age: 33
End: 2018-08-13

## 2018-08-13 DIAGNOSIS — Z3A.33 33 WEEKS GESTATION OF PREGNANCY: ICD-10-CM

## 2018-08-20 ENCOUNTER — APPOINTMENT (OUTPATIENT)
Dept: ANTEPARTUM | Facility: CLINIC | Age: 33
End: 2018-08-20

## 2018-08-20 ENCOUNTER — OUTPATIENT (OUTPATIENT)
Dept: OUTPATIENT SERVICES | Facility: HOSPITAL | Age: 33
LOS: 1 days | End: 2018-08-20
Payer: MEDICAID

## 2018-08-20 ENCOUNTER — APPOINTMENT (OUTPATIENT)
Dept: MATERNAL FETAL MEDICINE | Facility: CLINIC | Age: 33
End: 2018-08-20
Payer: MEDICAID

## 2018-08-20 VITALS — DIASTOLIC BLOOD PRESSURE: 60 MMHG | SYSTOLIC BLOOD PRESSURE: 100 MMHG | TEMPERATURE: 98.6 F

## 2018-08-20 PROCEDURE — G0463: CPT

## 2018-08-20 PROCEDURE — 90471 IMMUNIZATION ADMIN: CPT

## 2018-08-20 PROCEDURE — 90471 IMMUNIZATION ADMIN: CPT | Mod: NC

## 2018-08-20 PROCEDURE — 99213 OFFICE O/P EST LOW 20 MIN: CPT | Mod: GE,25

## 2018-08-20 PROCEDURE — 96372 THER/PROPH/DIAG INJ SC/IM: CPT | Mod: NC

## 2018-08-20 RX ADMIN — Medication 0 MG/ML: at 00:00

## 2018-08-28 DIAGNOSIS — Z87.51 PERSONAL HISTORY OF PRE-TERM LABOR: ICD-10-CM

## 2018-08-28 DIAGNOSIS — O09.93 SUPERVISION OF HIGH RISK PREGNANCY, UNSPECIFIED, THIRD TRIMESTER: ICD-10-CM

## 2018-08-28 DIAGNOSIS — Z3A.33 33 WEEKS GESTATION OF PREGNANCY: ICD-10-CM

## 2018-08-28 LAB — SURGICAL PATHOLOGY STUDY: SIGNIFICANT CHANGE UP

## 2018-09-17 ENCOUNTER — APPOINTMENT (OUTPATIENT)
Dept: MATERNAL FETAL MEDICINE | Facility: CLINIC | Age: 33
End: 2018-09-17
Payer: MEDICAID

## 2018-09-17 ENCOUNTER — OUTPATIENT (OUTPATIENT)
Dept: OUTPATIENT SERVICES | Facility: HOSPITAL | Age: 33
LOS: 1 days | End: 2018-09-17
Payer: MEDICAID

## 2018-09-17 VITALS — BODY MASS INDEX: 29.97 KG/M2 | WEIGHT: 161.2 LBS | DIASTOLIC BLOOD PRESSURE: 74 MMHG | SYSTOLIC BLOOD PRESSURE: 100 MMHG

## 2018-09-17 DIAGNOSIS — O09.899 SUPERVISION OF OTHER HIGH RISK PREGNANCIES, UNSPECIFIED TRIMESTER: ICD-10-CM

## 2018-09-17 DIAGNOSIS — Z34.90 ENCOUNTER FOR SUPERVISION OF NORMAL PREGNANCY, UNSPECIFIED, UNSPECIFIED TRIMESTER: ICD-10-CM

## 2018-09-17 PROCEDURE — 90656 IIV3 VACC NO PRSV 0.5 ML IM: CPT | Mod: NC

## 2018-09-17 PROCEDURE — 99213 OFFICE O/P EST LOW 20 MIN: CPT | Mod: GE,25

## 2018-09-17 PROCEDURE — 90656 IIV3 VACC NO PRSV 0.5 ML IM: CPT

## 2018-09-17 PROCEDURE — G0463: CPT | Mod: 25

## 2018-09-17 PROCEDURE — 90471 IMMUNIZATION ADMIN: CPT

## 2018-09-17 PROCEDURE — 90471 IMMUNIZATION ADMIN: CPT | Mod: NC

## 2018-09-24 DIAGNOSIS — Z23 ENCOUNTER FOR IMMUNIZATION: ICD-10-CM

## 2018-11-12 ENCOUNTER — RESULT CHARGE (OUTPATIENT)
Age: 33
End: 2018-11-12

## 2018-11-12 ENCOUNTER — MED ADMIN CHARGE (OUTPATIENT)
Age: 33
End: 2018-11-12

## 2018-11-12 ENCOUNTER — OUTPATIENT (OUTPATIENT)
Dept: OUTPATIENT SERVICES | Facility: HOSPITAL | Age: 33
LOS: 1 days | End: 2018-11-12
Payer: SELF-PAY

## 2018-11-12 ENCOUNTER — APPOINTMENT (OUTPATIENT)
Dept: OBGYN | Facility: CLINIC | Age: 33
End: 2018-11-12
Payer: SELF-PAY

## 2018-11-12 DIAGNOSIS — N76.0 ACUTE VAGINITIS: ICD-10-CM

## 2018-11-12 LAB
HCG UR QL: NEGATIVE
QUALITY CONTROL: NORMAL

## 2018-11-12 PROCEDURE — 81025 URINE PREGNANCY TEST: CPT | Mod: NC

## 2018-11-12 PROCEDURE — 96372 THER/PROPH/DIAG INJ SC/IM: CPT | Mod: NC

## 2018-11-12 PROCEDURE — 81025 URINE PREGNANCY TEST: CPT

## 2018-11-12 PROCEDURE — 96372 THER/PROPH/DIAG INJ SC/IM: CPT

## 2018-11-12 RX ORDER — MEDROXYPROGESTERONE ACETATE 150 MG/ML
150 INJECTION, SUSPENSION INTRAMUSCULAR
Qty: 0 | Refills: 0 | Status: COMPLETED | OUTPATIENT
Start: 2018-11-12

## 2018-11-12 RX ADMIN — MEDROXYPROGESTERONE ACETATE 0 MG/ML: 150 INJECTION, SUSPENSION, EXTENDED RELEASE INTRAMUSCULAR at 00:00

## 2018-11-15 DIAGNOSIS — Z30.42 ENCOUNTER FOR SURVEILLANCE OF INJECTABLE CONTRACEPTIVE: ICD-10-CM

## 2019-08-28 ENCOUNTER — EMERGENCY (EMERGENCY)
Facility: HOSPITAL | Age: 34
LOS: 1 days | Discharge: HOME | End: 2019-08-28
Attending: EMERGENCY MEDICINE
Payer: MEDICAID

## 2019-08-28 VITALS
RESPIRATION RATE: 18 BRPM | WEIGHT: 175.05 LBS | HEIGHT: 63 IN | HEART RATE: 83 BPM | SYSTOLIC BLOOD PRESSURE: 131 MMHG | DIASTOLIC BLOOD PRESSURE: 90 MMHG | TEMPERATURE: 98 F | OXYGEN SATURATION: 99 %

## 2019-08-28 PROCEDURE — 99284 EMERGENCY DEPT VISIT MOD MDM: CPT

## 2019-08-29 VITALS
RESPIRATION RATE: 18 BRPM | HEART RATE: 68 BPM | TEMPERATURE: 98 F | DIASTOLIC BLOOD PRESSURE: 70 MMHG | SYSTOLIC BLOOD PRESSURE: 104 MMHG | OXYGEN SATURATION: 100 %

## 2019-08-29 LAB
ALBUMIN SERPL ELPH-MCNC: 4.7 G/DL — SIGNIFICANT CHANGE UP (ref 3.3–5)
ALP SERPL-CCNC: 90 U/L — SIGNIFICANT CHANGE UP (ref 40–120)
ALT FLD-CCNC: 68 U/L — HIGH (ref 10–45)
ANION GAP SERPL CALC-SCNC: 13 MMOL/L — SIGNIFICANT CHANGE UP (ref 5–17)
AST SERPL-CCNC: 35 U/L — SIGNIFICANT CHANGE UP (ref 10–40)
BASOPHILS # BLD AUTO: 0.1 K/UL — SIGNIFICANT CHANGE UP (ref 0–0.2)
BASOPHILS NFR BLD AUTO: 0.9 % — SIGNIFICANT CHANGE UP (ref 0–2)
BILIRUB SERPL-MCNC: 0.5 MG/DL — SIGNIFICANT CHANGE UP (ref 0.2–1.2)
BUN SERPL-MCNC: 8 MG/DL — SIGNIFICANT CHANGE UP (ref 7–23)
CALCIUM SERPL-MCNC: 10.3 MG/DL — SIGNIFICANT CHANGE UP (ref 8.4–10.5)
CHLORIDE SERPL-SCNC: 103 MMOL/L — SIGNIFICANT CHANGE UP (ref 96–108)
CO2 SERPL-SCNC: 22 MMOL/L — SIGNIFICANT CHANGE UP (ref 22–31)
CREAT SERPL-MCNC: 0.53 MG/DL — SIGNIFICANT CHANGE UP (ref 0.5–1.3)
EOSINOPHIL # BLD AUTO: 0.2 K/UL — SIGNIFICANT CHANGE UP (ref 0–0.5)
EOSINOPHIL NFR BLD AUTO: 2.7 % — SIGNIFICANT CHANGE UP (ref 0–6)
GLUCOSE SERPL-MCNC: 87 MG/DL — SIGNIFICANT CHANGE UP (ref 70–99)
HCG SERPL-ACNC: <2 MIU/ML — SIGNIFICANT CHANGE UP
HCT VFR BLD CALC: 44.5 % — SIGNIFICANT CHANGE UP (ref 34.5–45)
HGB BLD-MCNC: 15.5 G/DL — SIGNIFICANT CHANGE UP (ref 11.5–15.5)
LYMPHOCYTES # BLD AUTO: 3.4 K/UL — HIGH (ref 1–3.3)
LYMPHOCYTES # BLD AUTO: 45.3 % — HIGH (ref 13–44)
MCHC RBC-ENTMCNC: 33.1 PG — SIGNIFICANT CHANGE UP (ref 27–34)
MCHC RBC-ENTMCNC: 34.9 GM/DL — SIGNIFICANT CHANGE UP (ref 32–36)
MCV RBC AUTO: 95 FL — SIGNIFICANT CHANGE UP (ref 80–100)
MONOCYTES # BLD AUTO: 0.5 K/UL — SIGNIFICANT CHANGE UP (ref 0–0.9)
MONOCYTES NFR BLD AUTO: 7.1 % — SIGNIFICANT CHANGE UP (ref 2–14)
NEUTROPHILS # BLD AUTO: 3.3 K/UL — SIGNIFICANT CHANGE UP (ref 1.8–7.4)
NEUTROPHILS NFR BLD AUTO: 44 % — SIGNIFICANT CHANGE UP (ref 43–77)
PLATELET # BLD AUTO: 217 K/UL — SIGNIFICANT CHANGE UP (ref 150–400)
POTASSIUM SERPL-MCNC: 4.4 MMOL/L — SIGNIFICANT CHANGE UP (ref 3.5–5.3)
POTASSIUM SERPL-SCNC: 4.4 MMOL/L — SIGNIFICANT CHANGE UP (ref 3.5–5.3)
PROT SERPL-MCNC: 7.6 G/DL — SIGNIFICANT CHANGE UP (ref 6–8.3)
RBC # BLD: 4.69 M/UL — SIGNIFICANT CHANGE UP (ref 3.8–5.2)
RBC # FLD: 11.5 % — SIGNIFICANT CHANGE UP (ref 10.3–14.5)
SODIUM SERPL-SCNC: 138 MMOL/L — SIGNIFICANT CHANGE UP (ref 135–145)
WBC # BLD: 7.6 K/UL — SIGNIFICANT CHANGE UP (ref 3.8–10.5)
WBC # FLD AUTO: 7.6 K/UL — SIGNIFICANT CHANGE UP (ref 3.8–10.5)

## 2019-08-29 PROCEDURE — 84702 CHORIONIC GONADOTROPIN TEST: CPT

## 2019-08-29 PROCEDURE — 85027 COMPLETE CBC AUTOMATED: CPT

## 2019-08-29 PROCEDURE — 80053 COMPREHEN METABOLIC PANEL: CPT

## 2019-08-29 PROCEDURE — 84443 ASSAY THYROID STIM HORMONE: CPT

## 2019-08-29 PROCEDURE — 99283 EMERGENCY DEPT VISIT LOW MDM: CPT

## 2019-08-29 RX ORDER — ACETAMINOPHEN 500 MG
650 TABLET ORAL ONCE
Refills: 0 | Status: COMPLETED | OUTPATIENT
Start: 2019-08-29 | End: 2019-08-29

## 2019-08-29 RX ADMIN — Medication 650 MILLIGRAM(S): at 02:51

## 2019-08-29 NOTE — ED PROVIDER NOTE - ATTENDING CONTRIBUTION TO CARE
34F no pmh p/w right ear pain and sensation in throat while swallowing x 6 months. Exam unremarkable. States pain is directly over thyroid although thyroid not palpable. No coughing or choking episodes. Discussed outpatient PCP and ENT follow up, pt agreed. Labs with TSH

## 2019-08-29 NOTE — ED PROVIDER NOTE - NS ED ROS FT
ROS:  GENERAL: No fever, no chills  EYES: no change in vision  HEENT: +R ear pain and sore throat. no trouble swallowing, no trouble speaking  CARDIAC: no chest pain  PULMONARY: no cough, no shortness of breath  GI: no abdominal pain, no nausea, no vomiting, no diarrhea, no constipation  : No dysuria, no frequency, no change in appearance, or odor of urine  SKIN: no rashes  NEURO: no headache, no weakness  MSK: No joint pain    Ky Meyers PGY2 ROS:  GENERAL: No fever, no chills  EYES: no change in vision  HEENT: +R ear pain and abnormal throat sensation while swalling. no difficulty swallowing, no trouble speaking  CARDIAC: no chest pain  PULMONARY: no cough, no shortness of breath  GI: no abdominal pain, no nausea, no vomiting, no diarrhea, no constipation  : No dysuria, no frequency, no change in appearance, or odor of urine  SKIN: no rashes  NEURO: no headache, no weakness  MSK: No joint pain    Ky Meyers PGY2

## 2019-08-29 NOTE — ED PROVIDER NOTE - PATIENT PORTAL LINK FT
You can access the FollowMyHealth Patient Portal offered by [facility] by registering at the following website: [email or web address]. By joining [employer]’s FollowMyHealth portal, you will also be able to view your health information using other applications (apps) compatible with our system.

## 2019-08-29 NOTE — ED PROVIDER NOTE - NSFOLLOWUPCLINICS_GEN_ALL_ED_FT
[facility]  ENT  [address], [address]  [place] [address]  Phone: [phone]  Fax:   Follow Up Time:

## 2019-08-29 NOTE — ED ADULT NURSE NOTE - OBJECTIVE STATEMENT
Pt is 34 Y A&O x3 F with no PMH presenting to ED with c/o intermittent R ear pain associated with "feeling a lump in my throat when I eat" and R sided headaches x6 months, worsening over last 2 weeks. Pt endorses intermittent dizziness, denies syncope. Pt denies chest pain, SOB/trouble breathing, hearing loss, fevers, abd pain. Pt arrives to ED breathing unlabored on RA. Lungs CTA. Abd soft and non-tender. Skin is warm and dry. Safety and comfort maintained.

## 2019-08-29 NOTE — ED PROVIDER NOTE - PHYSICAL EXAMINATION
Gen: AAOx3, non-toxic  Head: NCAT  HEENT: normal oropharynx no signs of infection, no cervical adenopathy. normal appearing TMs and auditory canals. no mastoid TTP. neck supple no meningismus. EOMI, oral mucosa moist, normal conjunctiva  Lung: CTAB, no respiratory distress, no wheezes/rhonchi/rales B/L, speaking in full sentences  CV: RRR, no murmurs, rubs or gallops  Abd: soft, NTND, no guarding, no CVA tenderness  MSK: no visible deformities  Neuro: No focal sensory or motor deficits, normal CN exam   Skin: Warm, well perfused, no rash  Psych: normal affect.     ~Ky Meyers PGY2

## 2019-08-29 NOTE — ED PROVIDER NOTE - CLINICAL SUMMARY MEDICAL DECISION MAKING FREE TEXT BOX
R ear and sore throat. No signs of infection in ears, throat. No meningismus. Pt well appearing, HD stable. Will check basic labs and reassess.

## 2019-08-29 NOTE — ED PROVIDER NOTE - NSFOLLOWUPINSTRUCTIONS_ED_ALL_ED_FT
Follow up with your PCP in 24-48 hours.   May take Tylenol and Motrin as directed on the bottle for pain control.   Return to the ER if you develop any new or worsening symptoms such as fever, chest pain, shortness of breath, numbness, weakness, abdominal pain, nausea, vomiting, or visual changes.

## 2019-08-29 NOTE — ED PROVIDER NOTE - OBJECTIVE STATEMENT
34F with no PMH p/w R ear pain and throat pain x 6 months worsening over the past 2 weeks. Worse on swallowing. Denies any other symptoms including fever,  SOB, cough, trouble swallowing, chest pain, abd pain, N/V/D, HA.

## 2019-08-29 NOTE — ED ADULT NURSE NOTE - NSIMPLEMENTINTERV_GEN_ALL_ED
Implemented All Universal Safety Interventions:  [place] to call system. Call bell, personal items and telephone within reach. Instruct patient to call for assistance. Room bathroom lighting operational. Non-slip footwear when patient is off stretcher. Physically safe environment: no spills, clutter or unnecessary equipment. Stretcher in lowest position, wheels locked, appropriate side rails in place.

## 2019-08-30 LAB — TSH SERPL-MCNC: 4.43 UIU/ML — HIGH (ref 0.27–4.2)

## 2019-08-30 NOTE — ED POST DISCHARGE NOTE - DETAILS
: [id number], Results given to patient with teach back, advised to speak with PCP regarding results, patient agrees, states she is feeling better since leaving ED. -Ginger Vicente PA-C

## 2019-09-02 ENCOUNTER — EMERGENCY (EMERGENCY)
Facility: HOSPITAL | Age: 34
LOS: 1 days | Discharge: HOME | End: 2019-09-02
Attending: EMERGENCY MEDICINE
Payer: SELF-PAY

## 2019-09-02 VITALS
DIASTOLIC BLOOD PRESSURE: 85 MMHG | OXYGEN SATURATION: 99 % | WEIGHT: 182.98 LBS | TEMPERATURE: 98 F | RESPIRATION RATE: 18 BRPM | HEART RATE: 81 BPM | SYSTOLIC BLOOD PRESSURE: 135 MMHG

## 2019-09-02 DIAGNOSIS — R07.0 PAIN IN THROAT: ICD-10-CM

## 2019-09-02 PROCEDURE — 70360 X-RAY EXAM OF NECK: CPT | Mod: 26

## 2019-09-02 PROCEDURE — 99283 EMERGENCY DEPT VISIT LOW MDM: CPT

## 2019-09-02 PROCEDURE — 31505 DIAGNOSTIC LARYNGOSCOPY: CPT

## 2019-09-02 PROCEDURE — 70360 X-RAY EXAM OF NECK: CPT

## 2019-09-02 PROCEDURE — 31575 DIAGNOSTIC LARYNGOSCOPY: CPT

## 2019-09-02 PROCEDURE — 99285 EMERGENCY DEPT VISIT HI MDM: CPT | Mod: 25

## 2019-09-02 PROCEDURE — 81025 URINE PREGNANCY TEST: CPT

## 2019-09-02 RX ORDER — ACETAMINOPHEN 500 MG
650 TABLET ORAL ONCE
Refills: 0 | Status: COMPLETED | OUTPATIENT
Start: 2019-09-02 | End: 2019-09-02

## 2019-09-02 RX ADMIN — Medication 650 MILLIGRAM(S): at 23:06

## 2019-09-02 NOTE — ED PROVIDER NOTE - PROGRESS NOTE DETAILS
Xray negative. ENT scoped patient, no foreign body. Discussed endocrinology follow up, pt verbalized understanding. Recommended continuing OTC meds for pain control.

## 2019-09-02 NOTE — ED PROVIDER NOTE - NSFOLLOWUPINSTRUCTIONS_ED_ALL_ED_FT
Please follow up with your primary physician in 24-48 hr.  Seek immediate medical care for any new/worsening signs or symptoms.  Please take 600 mg of Ibuprofen (aka Motrin, Advil) and/or 650 mg Acetaminophen (aka Tylenol) every 6 hours, as needed, for mild-moderate pain.    Please do not take these medications if you do not have pain or if you have any history of bleeding disorders, kidney or liver disease.   Do not use ibuprofen if you are on blood thinners (anti-coagulation).  FOLLOW UP WITH ENDOCRINOLOGY FOR YOUR ABNORMAL THYROID LEVEL.     -----      543/5000  Kristal un seguimiento con knight médico de cabecera en 24-48 h.  Busque atención médica inmediata para cualquier signo o síntoma nuevo o que empeore.  [place] 600 mg de ibuprofeno (también conocido johana Motrin, Advil) y / o 650 mg de acetaminofén (también conocido johana Tylenol) cada 6 horas, según sea necesario, para el dolor leve a moderado.  No tome estos medicamentos si no tiene dolor o si tiene antecedentes de trastornos hemorrágicos, enfermedad renal o hepática.  No use ibuprofeno si está tomando anticoagulantes (anticoagulación).  SEGUIMIENTO CON ENDOCRINOLOGÍA PARA KNIGHT NIVEL ANTIGUAL DE TIROIDES.

## 2019-09-02 NOTE — ED ADULT NURSE NOTE - CHPI ED NUR SYMPTOMS NEG
no dizziness/no weakness/no tingling/no fever/no chills/no decreased eating/drinking/no nausea/no vomiting

## 2019-09-02 NOTE — CONSULT NOTE ADULT - ASSESSMENT
33 yo female pmh obesity p/w throat pain. Pt reports she has intermittent difficulty swallowing. Tolerating secretions. Laryngoscopy done at bedside, normal exam. Airway patent. No foreign body.

## 2019-09-02 NOTE — ED PROVIDER NOTE - OBJECTIVE STATEMENT
35 y/o F who presented last week for sore throat and difficulty swallowing, now presents to ED again for same complaint. Here tonight because although she has ENT f/u, appointment not until next month and she wanted to be seen by someone sooner. States that when she turns her head or tries to swallow food, feels like something is stuck in her throat. Endorses 10 pound weight gain in past 3 months, lethargy/weakness, headache, mild constipation and menstrual irregularities that began about a year ago. Denies rash, skin changes, hair changes or SOB.

## 2019-09-02 NOTE — ED PROVIDER NOTE - PATIENT PORTAL LINK FT
You can access the FollowMyHealth Patient Portal offered by [facility] by registering at the following website: [email or web address]. By joining [employer]’s FollowMyHealth portal, you will also be able to view your health information using other applications (apps) compatible with our system.

## 2019-09-02 NOTE — ED ADULT NURSE NOTE - OBJECTIVE STATEMENT
34 year old female presented to ED with c/o of throat pain, pt states 'on Wednesday I was told I had elevated thyroid levels'. pt airway patent, no throat swelling/itching. pt denies CP, SOB, nausea/vomiting, numbness/tingling, fever, cough, chills, dizziness, headache, blurred vision, neuro intact. pt a&ox3, lung sounds clear, heart rate regular, abdomen soft nontender nondistended to palp. skin intact. MD and PA at bedside. Will continue to monitor and assess while offering support and reassurance.

## 2019-09-02 NOTE — ED PROVIDER NOTE - NSFOLLOWUPCLINICS_GEN_ALL_ED_FT
[facility] Endocrinology  Endocrinology  [address] [place] [address]  Phone: [phone]  Fax:   Follow Up Time:

## 2019-09-02 NOTE — ED PROVIDER NOTE - CLINICAL SUMMARY MEDICAL DECISION MAKING FREE TEXT BOX
35yo female pmh obesity p/w throat pain. Was in ED last week for similar issue, here gain because their earliest appointment with ENT is next month, but pt worried appointment too far away. Possible goiter at that time, TSH returned slightly elevated, pt's sx consistent with hypothyroidism (weight gain, menstrual irregularities, fatigue/weakness, headaches). ENT c/s for scope--no fb, vocal cords visualized without dysfunction. Patient will be referred to endocrinologist for further evaluation. Discharged in stable condition, patient amenable to plan.

## 2019-09-02 NOTE — CONSULT NOTE ADULT - SUBJECTIVE AND OBJECTIVE BOX
CC:    HPI:   **Include at least 4 modifiers (Onset? Duration? Quality? Radiation? Severity? Laterality? What makes it better or worse?)**      PAST MEDICAL & SURGICAL HISTORY:  No pertinent past medical history  No significant past surgical history    Allergies    No Known Allergies    Intolerances      MEDICATIONS  (STANDING):    MEDICATIONS  (PRN):      Social History: **??**    Family history: **??**    ROS:   ENT: all negative except as noted in HPI   CV: denies palpitations  Pulm: denies SOB, cough, hemoptysis  GI: denies change in apetite, indigestion, n/v  : denies pertinent urinary symptoms, urgency  Neuro: denies numbness/tingling, loss of sensation  Psych: denies anxiety  MS: denies muscle weakness, instability  Heme: denies easy bruising or bleeding  Endo: denies heat/cold intolerance, excessive sweating  Vascular: denies LE edema    Vital Signs Last 24 Hrs  T(C): 36.8 (02 Sep 2019 18:41), Max: 36.8 (02 Sep 2019 18:41)  T(F): 98.3 (02 Sep 2019 18:41), Max: 98.3 (02 Sep 2019 18:41)  HR: 81 (02 Sep 2019 18:41) (81 - 81)  BP: 135/85 (02 Sep 2019 18:41) (135/85 - 135/85)  BP(mean): --  RR: 18 (02 Sep 2019 18:41) (18 - 18)  SpO2: 99% (02 Sep 2019 18:41) (99% - 99%)              PHYSICAL EXAM:  Gen: NAD  Skin: No rashes, bruises, or lesions  Head: Normocephalic, Atraumatic  Face: no edema, erythema, or fluctuance. Parotid glands soft without mass  Eyes: no scleral injection  Nose: Nares bilaterally patent, no discharge  Mouth: No Stridor / Drooling / Trismus.  Mucosa moist, tongue/uvula midline, oropharynx clear  Neck: Flat, supple, no lymphadenopathy, trachea midline, no masses  Lymphatic: No lymphadenopathy  Resp: breathing easily, no stridor  CV: no peripheral edema/cyanosis  GI: nondistended   Peripheral vascular: no JVD or edema  Neuro: facial nerve intact, no facial droop        Diagnostic Nasal Endoscopy: (Scope #2 used)    Fiberoptic Indirect laryngoscopy:  (Scope #2 used)        IMAGING/ADDITIONAL STUDIES: CC: throat pain     HPI: 35 yo female pmh obesity p/w throat pain. Pt reports she has intermittent difficulty swallowing. Tolerating secretions. Started after eating plantain. Pt with weight gain, migraines, irregular menses c/w hypothyroidism. Translation done by family member.     PAST MEDICAL & SURGICAL HISTORY:  No pertinent past medical history  No significant past surgical history    Allergies    No Known Allergies    Intolerances      MEDICATIONS  (STANDING):    MEDICATIONS  (PRN):      Social History: nonsmoker     Family history: no pertinent family hx     ROS:   ENT: all negative except as noted in HPI       Vital Signs Last 24 Hrs  T(C): 36.8 (02 Sep 2019 18:41), Max: 36.8 (02 Sep 2019 18:41)  T(F): 98.3 (02 Sep 2019 18:41), Max: 98.3 (02 Sep 2019 18:41)  HR: 81 (02 Sep 2019 18:41) (81 - 81)  BP: 135/85 (02 Sep 2019 18:41) (135/85 - 135/85)  BP(mean): --  RR: 18 (02 Sep 2019 18:41) (18 - 18)  SpO2: 99% (02 Sep 2019 18:41) (99% - 99%)              PHYSICAL EXAM:  Gen: NAD  Skin: No rashes, bruises, or lesions  Head: Normocephalic, Atraumatic  Face: no edema, erythema, or fluctuance. Parotid glands soft without mass  Eyes: no scleral injection  Nose: Nares bilaterally patent, no discharge  Mouth: No Stridor / Drooling / Trismus.  Mucosa moist, tongue/uvula midline, oropharynx clear  Neck: Flat, supple, no lymphadenopathy, trachea midline, no masses  Lymphatic: No lymphadenopathy  Resp: breathing easily, no stridor  CV: no peripheral edema/cyanosis  GI: nondistended   Peripheral vascular: no JVD or edema  Neuro: facial nerve intact, no facial droop    Fiberoptic Indirect laryngoscopy:  (Scope #2 used)  Reason for Laryngoscopy:      Nasopharynx, oropharynx, and hypopharynx clear, no bleeding. Tongue base, posterior pharyngeal wall, vallecula, epiglottis, and subglottis appear normal. No erythema, edema, pooling of secretions, masses or lesions. Airway patent, no foreign body visualized. No glottic/supraglottic edema. True vocal cords, arytenoids, vestibular folds, ventricles, pyriform sinuses, and aryepiglottic folds appear normal bilaterally. Vocal cords mobile with good contact b/l.

## 2019-09-02 NOTE — ED PROVIDER NOTE - ATTENDING CONTRIBUTION TO CARE
35yo female pmh obesity p/w throat pain. Pt was in ED, evaluated by myself last week, discharged after pain improved with Tylenol. Possible goiter palpated at that time, I sent TSH which came back slightly elevated, pt told by phone post-discharge. Pt returned as she has intermittent difficulty swallowing. Tolerating secretions. Started after eating plantains. Soft tissue neck, ENT consult for scope to rule out foreign body. Pt with weight gain, migraines, irregular menses c/w hypothyroidism. Will give pain control and provide outpatient endocrinology follow up.

## 2019-09-03 VITALS
SYSTOLIC BLOOD PRESSURE: 122 MMHG | HEART RATE: 76 BPM | OXYGEN SATURATION: 100 % | DIASTOLIC BLOOD PRESSURE: 76 MMHG | TEMPERATURE: 98 F | RESPIRATION RATE: 16 BRPM

## 2019-09-03 LAB — HCG UR QL: NEGATIVE — SIGNIFICANT CHANGE UP

## 2019-10-01 ENCOUNTER — LABORATORY RESULT (OUTPATIENT)
Age: 34
End: 2019-10-01

## 2019-10-01 ENCOUNTER — APPOINTMENT (OUTPATIENT)
Dept: INTERNAL MEDICINE | Facility: CLINIC | Age: 34
End: 2019-10-01

## 2019-10-01 ENCOUNTER — NON-APPOINTMENT (OUTPATIENT)
Age: 34
End: 2019-10-01

## 2019-10-01 ENCOUNTER — OUTPATIENT (OUTPATIENT)
Dept: OUTPATIENT SERVICES | Facility: HOSPITAL | Age: 34
LOS: 1 days | End: 2019-10-01
Payer: SELF-PAY

## 2019-10-01 VITALS
HEIGHT: 61.5 IN | WEIGHT: 179 LBS | SYSTOLIC BLOOD PRESSURE: 120 MMHG | DIASTOLIC BLOOD PRESSURE: 82 MMHG | HEART RATE: 90 BPM | OXYGEN SATURATION: 95 % | BODY MASS INDEX: 33.36 KG/M2

## 2019-10-01 DIAGNOSIS — Z83.79 FAMILY HISTORY OF OTHER DISEASES OF THE DIGESTIVE SYSTEM: ICD-10-CM

## 2019-10-01 DIAGNOSIS — R94.5 ABNORMAL RESULTS OF LIVER FUNCTION STUDIES: ICD-10-CM

## 2019-10-01 DIAGNOSIS — E07.9 DISORDER OF THYROID, UNSPECIFIED: ICD-10-CM

## 2019-10-01 DIAGNOSIS — I10 ESSENTIAL (PRIMARY) HYPERTENSION: ICD-10-CM

## 2019-10-01 LAB
ALBUMIN SERPL ELPH-MCNC: 4.8 G/DL — SIGNIFICANT CHANGE UP (ref 3.3–5)
ALP SERPL-CCNC: 85 U/L — SIGNIFICANT CHANGE UP (ref 40–120)
ALT FLD-CCNC: 60 U/L — HIGH (ref 10–45)
ANION GAP SERPL CALC-SCNC: 11 MMOL/L — SIGNIFICANT CHANGE UP (ref 5–17)
AST SERPL-CCNC: 31 U/L — SIGNIFICANT CHANGE UP (ref 10–40)
BILIRUB SERPL-MCNC: 0.5 MG/DL — SIGNIFICANT CHANGE UP (ref 0.2–1.2)
BUN SERPL-MCNC: 8 MG/DL — SIGNIFICANT CHANGE UP (ref 7–23)
CALCIUM SERPL-MCNC: 9.7 MG/DL — SIGNIFICANT CHANGE UP (ref 8.4–10.5)
CHLORIDE SERPL-SCNC: 101 MMOL/L — SIGNIFICANT CHANGE UP (ref 96–108)
CO2 SERPL-SCNC: 25 MMOL/L — SIGNIFICANT CHANGE UP (ref 22–31)
CREAT SERPL-MCNC: 0.57 MG/DL — SIGNIFICANT CHANGE UP (ref 0.5–1.3)
GLUCOSE SERPL-MCNC: 90 MG/DL — SIGNIFICANT CHANGE UP (ref 70–99)
HCT VFR BLD CALC: 41.7 % — SIGNIFICANT CHANGE UP (ref 34.5–45)
HGB BLD-MCNC: 14.7 G/DL — SIGNIFICANT CHANGE UP (ref 11.5–15.5)
MCHC RBC-ENTMCNC: 35.3 GM/DL — SIGNIFICANT CHANGE UP (ref 32–36)
MCHC RBC-ENTMCNC: 35.3 PG — HIGH (ref 27–34)
MCV RBC AUTO: 100.2 FL — HIGH (ref 80–100)
PLATELET # BLD AUTO: 202 K/UL — SIGNIFICANT CHANGE UP (ref 150–400)
POTASSIUM SERPL-MCNC: 4.1 MMOL/L — SIGNIFICANT CHANGE UP (ref 3.5–5.3)
POTASSIUM SERPL-SCNC: 4.1 MMOL/L — SIGNIFICANT CHANGE UP (ref 3.5–5.3)
PROT SERPL-MCNC: 7.1 G/DL — SIGNIFICANT CHANGE UP (ref 6–8.3)
RBC # BLD: 4.16 M/UL — SIGNIFICANT CHANGE UP (ref 3.8–5.2)
RBC # FLD: 12.4 % — SIGNIFICANT CHANGE UP (ref 10.3–14.5)
SODIUM SERPL-SCNC: 137 MMOL/L — SIGNIFICANT CHANGE UP (ref 135–145)
WBC # BLD: 7.91 K/UL — SIGNIFICANT CHANGE UP (ref 3.8–10.5)
WBC # FLD AUTO: 7.91 K/UL — SIGNIFICANT CHANGE UP (ref 3.8–10.5)

## 2019-10-01 PROCEDURE — 84443 ASSAY THYROID STIM HORMONE: CPT

## 2019-10-01 PROCEDURE — G0463: CPT | Mod: 25

## 2019-10-01 PROCEDURE — 80053 COMPREHEN METABOLIC PANEL: CPT

## 2019-10-01 PROCEDURE — 86800 THYROGLOBULIN ANTIBODY: CPT

## 2019-10-01 PROCEDURE — 85027 COMPLETE CBC AUTOMATED: CPT

## 2019-10-01 PROCEDURE — 93005 ELECTROCARDIOGRAM TRACING: CPT

## 2019-10-01 PROCEDURE — 36415 COLL VENOUS BLD VENIPUNCTURE: CPT

## 2019-10-02 ENCOUNTER — FORM ENCOUNTER (OUTPATIENT)
Age: 34
End: 2019-10-02

## 2019-10-02 LAB
T4 FREE+ TSH PNL SERPL: 2.47 UIU/ML — SIGNIFICANT CHANGE UP (ref 0.27–4.2)
THYROGLOB AB FLD IA-ACNC: <20 IU/ML — SIGNIFICANT CHANGE UP (ref 0–40)
THYROGLOB AB SERPL-ACNC: <20 IU/ML — SIGNIFICANT CHANGE UP (ref 0–40)
THYROPEROXIDASE AB SERPL-ACNC: <10 IU/ML — SIGNIFICANT CHANGE UP (ref 0–34.9)

## 2019-10-03 ENCOUNTER — APPOINTMENT (OUTPATIENT)
Dept: ULTRASOUND IMAGING | Facility: CLINIC | Age: 34
End: 2019-10-03
Payer: COMMERCIAL

## 2019-10-03 ENCOUNTER — OUTPATIENT (OUTPATIENT)
Dept: OUTPATIENT SERVICES | Facility: HOSPITAL | Age: 34
LOS: 1 days | End: 2019-10-03

## 2019-10-03 DIAGNOSIS — I10 ESSENTIAL (PRIMARY) HYPERTENSION: ICD-10-CM

## 2019-10-03 PROBLEM — Z83.79 FAMILY HISTORY OF HEPATITIS: Status: ACTIVE | Noted: 2019-10-03

## 2019-10-03 PROBLEM — R94.5 LIVER FUNCTION TEST ABNORMALITY: Status: ACTIVE | Noted: 2019-10-01

## 2019-10-03 PROCEDURE — 76536 US EXAM OF HEAD AND NECK: CPT | Mod: 26

## 2019-10-03 RX ORDER — PNV NO.95/FERROUS FUM/FOLIC AC 28MG-0.8MG
TABLET ORAL
Refills: 0 | Status: DISCONTINUED | COMMUNITY
Start: 2018-01-31 | End: 2019-10-03

## 2019-10-03 RX ORDER — DOCUSATE SODIUM 100 MG/1
100 CAPSULE ORAL DAILY
Qty: 30 | Refills: 2 | Status: DISCONTINUED | COMMUNITY
Start: 2018-05-21 | End: 2019-10-03

## 2019-10-03 NOTE — REVIEW OF SYSTEMS
[Fatigue] : fatigue [Palpitations] : palpitations [Fever] : no fever [Chills] : no chills [Earache] : no earache [Hearing Loss] : no hearing loss [Abdominal Pain] : no abdominal pain [Nausea] : no nausea [Constipation] : no constipation [Diarrhea] : diarrhea [Vomiting] : no vomiting [Dysuria] : no dysuria [Incontinence] : no incontinence [Frequency] : no frequency [Joint Pain] : no joint pain [Itching] : no itching [Skin Rash] : no skin rash [Headache] : no headache [Dizziness] : no dizziness [FreeTextEntry2] : Sensation of feeling cold [FreeTextEntry4] : Throat pain [FreeTextEntry7] : Dysphagia

## 2019-10-03 NOTE — HISTORY OF PRESENT ILLNESS
[FreeTextEntry1] : Patient presents for follow-up regarding thyroid issues. [de-identified] : 34F PMHx constipation presents to the clinic for follow-up regarding an issue with her thyroid. The patient reports that she started having throat pain 2 months ago. The pain was intermittent and minor when it first started, but approximately 5 weeks ago, the pain increased in frequency and intensity. The patient describes the pain as an 8/10, stabbing, constant pain with no exacerbating factors. The patient states that the pain makes her crave sweets and when she eats sweets, the neck pain goes down to a 3/10 pain. \par \par She went to the emergency department on August 29th for excruciating throat pain; the staff did a throat exam and daniella labs but offered no interventions. The patient was informed that her thyroid levels were high. She returned on September 2nd because of persistent throat pain. On this second visit, she received a fiberoptic exam by ENT which was negative, was advised to follow up with them as outpatient. She was told that her thyroid was the issue; she was informed that she needed a sonogram of the neck. \par \par She states that she has been experiencing the following: occasional palpitations lasting 40 seconds per episode, once every couple of weeks starting approximately 2 months ago, fatigue (described as low energy) for 12 weeks, a near-syncopal episode approximately 6 weeks ago, a sensation of feeling cold, and dysphagia (food getting stuck). The patient denies fever, SOB, diarrhea, constipation, lightheadedness and dizziness, abnormal skin rashes, vision changes, weight changes, urinary changes, recent illness, recent sick contacts, recent travel, change in eating habits, or pain with eating. 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

## 2019-10-03 NOTE — PAST MEDICAL HISTORY
[Menstruating] : menstruating [Approximately ___] : the LMP was approximately [unfilled] [Less Bleeding] : the period was lighter than normal [Abnormal Duration ___ days] : the duration was abnormal lasting [unfilled] days

## 2019-10-03 NOTE — PHYSICAL EXAM
[No Acute Distress] : no acute distress [Well-Appearing] : well-appearing [PERRL] : pupils equal round and reactive to light [EOMI] : extraocular movements intact [No Lymphadenopathy] : no lymphadenopathy [No Respiratory Distress] : no respiratory distress  [Clear to Auscultation] : lungs were clear to auscultation bilaterally [Normal Rate] : normal rate  [Regular Rhythm] : with a regular rhythm [Normal S1, S2] : normal S1 and S2 [No Edema] : there was no peripheral edema [Soft] : abdomen soft [Non Tender] : non-tender [Non-distended] : non-distended [No Spinal Tenderness] : no spinal tenderness [No Joint Swelling] : no joint swelling [Grossly Normal Strength/Tone] : grossly normal strength/tone [No Rash] : no rash [No Skin Lesions] : no skin lesions [No Focal Deficits] : no focal deficits [Normal Gait] : normal gait [Alert and Oriented x3] : oriented to person, place, and time [de-identified] : R lobe of thyroid is diffusely enlarged compared to L lobe, nontender

## 2019-10-03 NOTE — ASSESSMENT
[FreeTextEntry1] : 34F PMHx constipation presents to the clinic for follow-up regarding throat pain and dysphagia, likely related to thyroid pathology given elevated TSH and R lobe hypertrophy elicited on examination; also possibly related to dysmotility or malignancy. Patient also presents with complaint of palpitations; ddx includes endocrine (thyroid mediated) vs cardiac.\par \par #Palpitations\par - In-clinic EKG was normal\par - Cardiology referral for Holter monitoring\par - check labs\par \par #Dysphagia\par - Evaluated by ENT in ED - no remarkable findings\par - ENT referral for outpatient follow up \par - Gastroenterology referral for evaluation \par \par #Thyroid pathology, unspecified\par - Diffuse hypertrophy of R thyroid lobe on exam with elevated TSH\par - CBC, CMP\par - TSH with reflex\par - Thyroid antibodies\par - US of neck (thyroid)\par - Endocrinology referral\par \par Patient advised to follow-up in 5 weeks for follow-up, sooner if her symptoms worsen.\par \par Case discussed with Dr. Gilda Pineda.\par \par Ishaan Dixon II, PGY-1\par [employer], [employer]

## 2019-10-08 ENCOUNTER — APPOINTMENT (OUTPATIENT)
Dept: OTOLARYNGOLOGY | Facility: CLINIC | Age: 34
End: 2019-10-08
Payer: COMMERCIAL

## 2019-10-08 ENCOUNTER — OUTPATIENT (OUTPATIENT)
Dept: OUTPATIENT SERVICES | Facility: HOSPITAL | Age: 34
LOS: 1 days | Discharge: HOME | End: 2019-10-08

## 2019-10-08 VITALS
WEIGHT: 179 LBS | HEIGHT: 61.5 IN | SYSTOLIC BLOOD PRESSURE: 111 MMHG | BODY MASS INDEX: 33.36 KG/M2 | HEART RATE: 77 BPM | DIASTOLIC BLOOD PRESSURE: 73 MMHG

## 2019-10-08 PROCEDURE — 99204 OFFICE O/P NEW MOD 45 MIN: CPT | Mod: 25

## 2019-10-08 PROCEDURE — 31575 DIAGNOSTIC LARYNGOSCOPY: CPT

## 2019-10-08 NOTE — PROCEDURE
[Flexible Endoscope] : examined with the flexible endoscope [Serial Number: ___] : Serial Number: [unfilled] [Dysphagia] : dysphagia not clearly evaluated by indirect laryngoscopy [None] : none [de-identified] : No lesions in the NPx, OPx, HPx or larynx.  VC are mobile, airway patent.  Patient with significant changes of LPRD on exam with interarytenoid pachyderma and edema.\par

## 2019-10-08 NOTE — HISTORY OF PRESENT ILLNESS
[de-identified] : Referred by Dr. Vo for dysphagia for past two months.  Pt does not choke, she has globus sensation.  Pt has a small amount of pain when swallowing.  Pt is eating a regular diet.  Pt also c/o R otalgia.  Pt had an US thyroid on 10/3/19 which was normal.  She frequently eats late at night and has a few cups of coffee daily.  She denies any dyspnea, weight loss.

## 2019-10-08 NOTE — REASON FOR VISIT
[[employer] ] : provided by [employer]   [Initial Evaluation] : an initial evaluation for [FreeTextEntry1] : [id number] [FreeTextEntry2] : Shamika [TWNoteComboBox1] : [place]

## 2019-10-09 ENCOUNTER — OUTPATIENT (OUTPATIENT)
Dept: OUTPATIENT SERVICES | Facility: HOSPITAL | Age: 34
LOS: 1 days | End: 2019-10-09
Payer: SELF-PAY

## 2019-10-09 ENCOUNTER — APPOINTMENT (OUTPATIENT)
Dept: CARDIOLOGY | Facility: HOSPITAL | Age: 34
End: 2019-10-09

## 2019-10-09 ENCOUNTER — NON-APPOINTMENT (OUTPATIENT)
Age: 34
End: 2019-10-09

## 2019-10-09 VITALS
OXYGEN SATURATION: 98 % | SYSTOLIC BLOOD PRESSURE: 111 MMHG | HEART RATE: 68 BPM | HEIGHT: 61.5 IN | BODY MASS INDEX: 33.36 KG/M2 | DIASTOLIC BLOOD PRESSURE: 75 MMHG | WEIGHT: 179 LBS

## 2019-10-09 DIAGNOSIS — I25.10 ATHEROSCLEROTIC HEART DISEASE OF NATIVE CORONARY ARTERY WITHOUT ANGINA PECTORIS: ICD-10-CM

## 2019-10-09 DIAGNOSIS — R94.5 ABNORMAL RESULTS OF LIVER FUNCTION STUDIES: ICD-10-CM

## 2019-10-09 DIAGNOSIS — R00.2 PALPITATIONS: ICD-10-CM

## 2019-10-09 DIAGNOSIS — E07.9 DISORDER OF THYROID, UNSPECIFIED: ICD-10-CM

## 2019-10-09 DIAGNOSIS — R13.10 DYSPHAGIA, UNSPECIFIED: ICD-10-CM

## 2019-10-09 PROCEDURE — G0463: CPT

## 2019-10-10 DIAGNOSIS — K21.9 GASTRO-ESOPHAGEAL REFLUX DISEASE WITHOUT ESOPHAGITIS: ICD-10-CM

## 2019-10-10 NOTE — DISCUSSION/SUMMARY
[FreeTextEntry1] : 35 y/o Azeri-speaking F w/ no PMHx who was sent to Cardiology Clinic for palpitations.  Unclear etiology - may be sinus tachycardia in the setting of lifting boxes at work.  Her symptoms while she is resting at home point away from this however.  \par \par Plan:\par \par 1. Palpitations\par -Ziopatch\par \par Will call patient with results. Further visits pending results of Ziopatch

## 2019-10-10 NOTE — HISTORY OF PRESENT ILLNESS
[FreeTextEntry1] :  ID: [id number]\par \par 35 y/o Iraqi-speaking F w/ no PMHx who was sent to Cardiology Clinic for palpitations.  She went to the ER for throat pain and in a follow up visit her PCP asked about palpitations - she reports her heart races occasionally for 40s-1m.  The symptoms happen once per week.  They normally occur when she is at work lifting heavy objects.  It also occurs at home when she is tired.  No diziness or lightheadedness. No LOC or vision changes. No FHx of SCD.\par \par PMHx: None\par PSHx: None\par SH: Born in [facility] - moved to the  14 years ago, no toxic habits, works in a grocery store\par FHx: No FHx of heart disease or SCD\par All: NKDA\par Meds: Omeprazole\par \par TSH from 10/1/2019: 2.47\par \par 10 point ROS negative unless otherwise noted above

## 2019-10-10 NOTE — PHYSICAL EXAM
[Normal Appearance] : normal appearance [General Appearance - Well Developed] : well developed [General Appearance - Well Nourished] : well nourished [No Deformities] : no deformities [Heart Sounds] : normal S1 and S2 [Heart Rate And Rhythm] : heart rate and rhythm were normal [Murmurs] : no murmurs present [Arterial Pulses Normal] : the arterial pulses were normal [Edema] : no peripheral edema present [Respiration, Rhythm And Depth] : normal respiratory rhythm and effort [Exaggerated Use Of Accessory Muscles For Inspiration] : no accessory muscle use [Auscultation Breath Sounds / Voice Sounds] : lungs were clear to auscultation bilaterally [Bowel Sounds] : normal bowel sounds [Abdomen Soft] : soft [Abdomen Tenderness] : non-tender [Abdomen Mass (___ Cm)] : no abdominal mass palpated [Nail Clubbing] : no clubbing of the fingernails [Cyanosis, Localized] : no localized cyanosis [Skin Color & Pigmentation] : normal skin color and pigmentation [Skin Turgor] : normal skin turgor [] : no rash [Oriented To Time, Place, And Person] : oriented to person, place, and time [Impaired Insight] : insight and judgment were intact [Affect] : the affect was normal [Mood] : the mood was normal [No Anxiety] : not feeling anxious [FreeTextEntry1] : mild central obesity

## 2019-10-11 DIAGNOSIS — R00.2 PALPITATIONS: ICD-10-CM

## 2019-10-16 ENCOUNTER — APPOINTMENT (OUTPATIENT)
Dept: INTERNAL MEDICINE | Facility: CLINIC | Age: 34
End: 2019-10-16

## 2019-10-16 ENCOUNTER — OUTPATIENT (OUTPATIENT)
Dept: OUTPATIENT SERVICES | Facility: HOSPITAL | Age: 34
LOS: 1 days | End: 2019-10-16
Payer: SELF-PAY

## 2019-10-16 VITALS
OXYGEN SATURATION: 97 % | DIASTOLIC BLOOD PRESSURE: 82 MMHG | BODY MASS INDEX: 32.99 KG/M2 | HEART RATE: 79 BPM | WEIGHT: 177 LBS | SYSTOLIC BLOOD PRESSURE: 120 MMHG | HEIGHT: 61.5 IN

## 2019-10-16 DIAGNOSIS — I10 ESSENTIAL (PRIMARY) HYPERTENSION: ICD-10-CM

## 2019-10-16 DIAGNOSIS — R94.5 ABNORMAL RESULTS OF LIVER FUNCTION STUDIES: ICD-10-CM

## 2019-10-16 DIAGNOSIS — E07.9 DISORDER OF THYROID, UNSPECIFIED: ICD-10-CM

## 2019-10-16 LAB
BILIRUB UR QL STRIP: NEGATIVE
GLUCOSE UR-MCNC: NEGATIVE
HCG UR QL: 0.2 EU/DL
HGB UR QL STRIP.AUTO: NEGATIVE
KETONES UR-MCNC: NORMAL
LEUKOCYTE ESTERASE UR QL STRIP: NEGATIVE
NITRITE UR QL STRIP: NEGATIVE
PH UR STRIP: 6
PROT UR STRIP-MCNC: NORMAL
SP GR UR STRIP: >=1.03

## 2019-10-16 PROCEDURE — G0463: CPT

## 2019-10-17 NOTE — PHYSICAL EXAM
[Normal Sclera/Conjunctiva] : normal sclera/conjunctiva [Normal] : no posterior cervical lymphadenopathy and no anterior cervical lymphadenopathy [de-identified] : No erythema in posterior oropharynx [de-identified] : No epigastric tenderness.

## 2019-10-17 NOTE — DATA REVIEWED
[FreeTextEntry1] : \par CBC and chemistries WNL\par Notably, TSH normalized, anti-thyroglobluin antibodies negative and neck US negative.\par \par \par  Complete Blood Count             Final\par \par No Documents Attached\par \par \par   Test   Result   Flag Reference Goal \par   WBC 7.91 K/uL   3.80-10.50  \par   RBC Count 4.16 M/uL   3.80-5.20  \par   HGB 14.7 g/dL   11.5-15.5  \par   HCT 41.7 %   34.5-45.0  \par  Mean Cell Volume 100.2 fl HH 80.0-100.0  \par  Mean Cell Hemoglobin 35.3 pg HH 27.0-34.0  \par   Mean Cell Hemoglobin Conc 35.3 gm/dL   32.0-36.0  \par   Red Cell Distrib Width 12.4 %   10.3-14.5  \par    K/uL   150-400  \par \par  Ordered by: LILLI JAFFE       Collected/Examined: 01Oct2019 03:47PM       \par Verified by: LILLI JAFFE 04Oct2019 06:24PM       \par  Result Communication: Call patient with results,Left message for patient;\par Stage: Final       \par  Performed at: [facility] (Med Director: Rodriguez Gibson M.D)       Resulted: 01Oct2019 11:12PM       Last Updated: 04Oct2019 06:24PM       Accession: [id number]       \par \par \par \par  Comprehensive Metabolic Panel             Final\par \par No Documents Attached\par \par \par   Test   Result   Flag Reference Goal \par   Sodium 137 mmol/L   135-145  \par   Potassium 4.1 mmol/L   3.5-5.3  \par   Chloride 101 mmol/L     \par   CO2 25 mmol/L   22-31  \par   Anion Gap, Serum 11 mmol/L   5-17  \par   Glucose 90 mg/dL   70-99  \par   BUN 8 mg/dL   7-23  \par   Creatinine 0.57 mg/dL   0.50-1.30  \par   Total Protein 7.1 g/dL   6.0-8.3  \par   Albumin 4.8 g/dL   3.3-5.0  \par   Calcium, Serum 9.7 mg/dL   8.4-10.5  \par   Total Bilirubin 0.5 mg/dL   0.2-1.2  \par   AST (SGOT) 31 U/L   10-40  \par  ALT(SGPT) 60 U/L HH 10-45  \par   ALK PHOS 85 U/L     \par   eGFR Non African-American 123 mL/min/1.73M2   >=60  \par   Interpretative comment\par The units for eGFR are mL/min/1.73M2 (normalized body surface area). The\par eGFR is calculated from a serum creatinine using the CKD-EPI equation.\par Other variables required for calculation are race, age and sex. Among\par patients with chronic kidney disease (CKD), the eGFR is useful in\par determining the stage of disease according to KDOQI CKD classification.\par All eGFR results are reported numerically with the following\par interpretation.\par         GFR                    With                        Without\par    (ml/min/1.73 m2)    Kidney Damage       Kidney Damage\par       >= 90                    Stage 1                     Normal\par       60-89                    Stage 2                     Decreased GFR\par       30-59                    Stage 3                     Stage 3\par       15-29                    Stage 4                     Stage 4\par       < 15                      Stage 5                     Stage 5\par \par Each stage of CKD assumes that the associated GFR level has been in\par effect for at least 3 months. Determination of stages one and two (with\par eGFR > 59 ml/min/m2) requires estimation of kidney damage for at least 3\par months as defined by structural or functional abnormalities.\par Limitations: All estimates of GFR will be less accurate for patients at\par extremes of muscle mass (including but not limited to frail elderly,\par critically ill, or cancer patients), those with unusual diets, and those\par with conditions associated with reduced secretion or extrarenal\par elimination of creatinine. The eGFR equation is not recommended for use\par in patients with unstable creatinine levels.\par \par  \par   eGFR African-American 142 mL/min/1.73M2   >=60  \par \par  Ordered by: LILLI JFAFE       Collected/Examined: 01Oct2019 03:47PM       \par Verified by: LILLI JAFFE 04Oct2019 06:24PM       \par  Result Communication: Call patient with results,Left message for patient;\par Stage: Final       \par  Performed at: [facility] (Med Director: Rodriguez Gibson M.D)       Resulted: 01Oct2019 11:42PM       Last Updated: 04Oct2019 06:24PM       Accession: [id number]       \par \par \par \par  Thyroid Stimulating Hormone, Serum w/ FT4 Reflex             Final\par \par No Documents Attached\par \par \par   Test   Result   Flag Reference Goal \par   TSHX 2.47 uIU/mL   0.27-4.20  \par \par  Ordered by: LILLI JAFFE       Collected/Examined: 01Oct2019 03:47PM       \par Verified by: LILLI JAFFE 04Oct2019 06:24PM       \par  Result Communication: Call patient with results,Left message for patient;\par Stage: Final       \par  Performed at: [facility] (Med Director: Rodriguez Gibson M.D)       Resulted: 02Oct2019 02:32AM       Last Updated: 04Oct2019 06:24PM       Accession: [id number]       \par \par \par \par  Anti Thyroid Antibodies             Final\par \par No Documents Attached\par \par \par   Test   Result   Flag Reference Goal \par   Thyroid Peroxidase Antibody <10.0 IU/mL   0.0-34.9  \par   Thyroglobulin Antibodies <20.0 IU/mL   0.0-40.0  \par \par  Ordered by: LILLI JAFFE       Collected/Examined: 01Oct2019 03:47PM       \par Verified by: LILLI JAFFE 04Oct2019 06:24PM       \par  Result Communication: Call patient with results,Left message for patient;\par Stage: Final       \par  Performed at: [facility] (Med Director: Rodriguez Gibson M.D)       Resulted: 02Oct2019 02:20PM       Last Updated: 04Oct2019 06:24PM       Accession: [id number]       \par \par \par Neck US 10/2019\par Impression:\par Normal thyroid

## 2019-10-17 NOTE — ASSESSMENT
[FreeTextEntry1] : 35 yo F with noncontributory medical history p/w GERD c/b laryngopharyngeal reflux disease.

## 2019-10-17 NOTE — REVIEW OF SYSTEMS
[Recent Change In Weight] : ~T recent weight change [Sore Throat] : sore throat [Fever] : no fever [Nasal Discharge] : no nasal discharge [Chest Pain] : no chest pain [Palpitations] : no palpitations [Shortness Of Breath] : no shortness of breath [Cough] : no cough [Abdominal Pain] : no abdominal pain [Skin Rash] : no skin rash [FreeTextEntry2] : 4 lb weight loss with dietary changes

## 2019-10-17 NOTE — HISTORY OF PRESENT ILLNESS
[Spouse] : spouse [FreeTextEntry1] : F/u for sore throat [de-identified] : Since ENT diagnosed laryngopharyngeal reflux disease, she has been taking omeprazole 40 mg daily. She stopped eating spicy food and coffee. She stopped eating 3 hours prior to eating. Her pain was 8/10 at its worse, and occurred 6 times a day. She feels a rising sensationi in her chest when recumbent. It was previously associated with heart palpitations. It was associated with dysphagia after two months of symptoms at its worse.\par \par Since taking the omperazole and making lifestyle changes, her pain is 3-4/10 during episodes of pain that now occur only 3 times a day. She no longer experiences dysphagia. She has experienced no further palpitations. She has had a 4 pound weight loss with changes in her diet.

## 2019-10-17 NOTE — PLAN
[FreeTextEntry1] : #GERD with laryngopharyngeal reflux disease\par -C/w omeprazole 40 mg daily. Continue through next visit with goal to d/c in 5 weeks.\par -Educated and provided material on behavioral changes\par -Dysphagia after 2 months of symptoms is concerning although now resolved. Will keep GI appointment for now and reevaluate in 5 weeks after PPI and behavioral changes.\par \par #Hypothyroid, resolved\par -No further workup at this time\par -Will consider repeat TSH in 5 weeks depending on any further symptoms.\par \par #Palpitations\par -C/w event monitor per cardiology\par \par #HCM\par -PHQ-2 negative\par -Deferred flu vaccine for next visit

## 2019-10-23 DIAGNOSIS — R13.10 DYSPHAGIA, UNSPECIFIED: ICD-10-CM

## 2019-10-23 DIAGNOSIS — K21.9 GASTRO-ESOPHAGEAL REFLUX DISEASE WITHOUT ESOPHAGITIS: ICD-10-CM

## 2019-11-05 ENCOUNTER — APPOINTMENT (OUTPATIENT)
Dept: INTERNAL MEDICINE | Facility: CLINIC | Age: 34
End: 2019-11-05

## 2019-11-20 ENCOUNTER — MED ADMIN CHARGE (OUTPATIENT)
Age: 34
End: 2019-11-20

## 2019-11-20 ENCOUNTER — APPOINTMENT (OUTPATIENT)
Dept: INTERNAL MEDICINE | Facility: CLINIC | Age: 34
End: 2019-11-20

## 2019-11-20 ENCOUNTER — OUTPATIENT (OUTPATIENT)
Dept: OUTPATIENT SERVICES | Facility: HOSPITAL | Age: 34
LOS: 1 days | End: 2019-11-20
Payer: SELF-PAY

## 2019-11-20 VITALS
HEIGHT: 61.5 IN | WEIGHT: 170 LBS | SYSTOLIC BLOOD PRESSURE: 110 MMHG | BODY MASS INDEX: 31.69 KG/M2 | DIASTOLIC BLOOD PRESSURE: 80 MMHG

## 2019-11-20 DIAGNOSIS — K21.9 GASTRO-ESOPHAGEAL REFLUX DISEASE W/OUT ESOPHAGITIS: ICD-10-CM

## 2019-11-20 DIAGNOSIS — I10 ESSENTIAL (PRIMARY) HYPERTENSION: ICD-10-CM

## 2019-11-20 PROCEDURE — G0463: CPT

## 2019-11-21 DIAGNOSIS — Z87.19 PERSONAL HISTORY OF OTHER DISEASES OF THE DIGESTIVE SYSTEM: ICD-10-CM

## 2019-11-21 DIAGNOSIS — K21.9 GASTRO-ESOPHAGEAL REFLUX DISEASE WITHOUT ESOPHAGITIS: ICD-10-CM

## 2019-11-21 NOTE — ASSESSMENT
[FreeTextEntry1] : Pt is a 34yoF w/ h/o GERD, laryngopharyngeal reflux disease, as diagnosed by ENT in October of this year, recently started on omperazole 40mg with plan to dc today\par \par #Laryngopharyngeal reflux disease\par - will continue omeprazole for now given pt still having symptoms\par - educated pt to continue lifestyle modification including eating less coffee, spicy foods, acidic foods and losing weight\par - explained that once pt comes off medication, she is likely to feel some dysphagia symptoms again although as lifestyle modification remains changed, those symptoms will likely resolve\par - RTC in 2 months\par \par #Ear pain\par - Possibly irritation 2/2 to changing weather, dryness, reasons multifactorial, unclear given normal physical exam\par - advised to keep the ear clean, try OTC advil if pain persist and call back if symptoms do not resolve\par \par #HCM\par - flu shot today\par - RTC in 2 months for f/u visit

## 2019-11-21 NOTE — PHYSICAL EXAM
[No Acute Distress] : no acute distress [Normal Sclera/Conjunctiva] : normal sclera/conjunctiva [Normal Voice/Communication] : normal voice/communication [Normal Outer Ear/Nose] : the outer ears and nose were normal in appearance [No JVD] : no jugular venous distention [No Carotid Bruits] : no carotid bruits [No Edema] : there was no peripheral edema [Normal Rate] : normal rate  [No HSM] : no HSM [Soft] : abdomen soft [No CVA Tenderness] : no CVA  tenderness [No Rash] : no rash [No Joint Swelling] : no joint swelling [Speech Grossly Normal] : speech grossly normal [Normal Mood] : the mood was normal [Coordination Grossly Intact] : coordination grossly intact [de-identified] : inner ear no erythema, discharge, bilaterally

## 2019-11-21 NOTE — HISTORY OF PRESENT ILLNESS
[FreeTextEntry1] : Pt presenting for a follow-up visit [de-identified] :  ID: [id number]\par \par Pt is a 34yoF w/ h/o GERD, laryngopharyngeal reflux disease, as diagnosed by ENT in October of this year, recently started on omperazole 40mg with plan to dc today, here for a follow-up visit. At this visit, pt is still complaiing of some symptoms of food getting stuck in her throat about 2 times a day. Prior to omeprazole adminisitration, she was experiencing these symptoms 6 times a day. Pt reports concerns about omeprazole and pregnancy and is concerned about omeprazole's effects during pregnancy. Otherwise, pt reports some R ear pain, especially when it is windy and cold outside. Pt otherwise denying any fevers, headaches, dizziness, chest p/p , sob, n/v/d, skin changes.

## 2019-11-21 NOTE — HISTORY OF PRESENT ILLNESS
[FreeTextEntry1] : Pt presenting for a follow-up visit [de-identified] :  ID: [id number]\par \par Pt is a 34yoF w/ h/o GERD, laryngopharyngeal reflux disease, as diagnosed by ENT in October of this year, recently started on omperazole 40mg with plan to dc today, here for a follow-up visit. At this visit, pt is still complaiing of some symptoms of food getting stuck in her throat about 2 times a day. Prior to omeprazole adminisitration, she was experiencing these symptoms 6 times a day. Pt reports concerns about omeprazole and pregnancy and is concerned about omeprazole's effects during pregnancy. Otherwise, pt reports some R ear pain, especially when it is windy and cold outside. Pt otherwise denying any fevers, headaches, dizziness, chest p/p , sob, n/v/d, skin changes.

## 2019-11-21 NOTE — PHYSICAL EXAM
[No Acute Distress] : no acute distress [Normal Outer Ear/Nose] : the outer ears and nose were normal in appearance [Normal Voice/Communication] : normal voice/communication [Normal Sclera/Conjunctiva] : normal sclera/conjunctiva [No JVD] : no jugular venous distention [No Edema] : there was no peripheral edema [No Carotid Bruits] : no carotid bruits [Normal Rate] : normal rate  [No HSM] : no HSM [Soft] : abdomen soft [No Joint Swelling] : no joint swelling [No CVA Tenderness] : no CVA  tenderness [No Rash] : no rash [Speech Grossly Normal] : speech grossly normal [Coordination Grossly Intact] : coordination grossly intact [Normal Mood] : the mood was normal [de-identified] : inner ear no erythema, discharge, bilaterally

## 2019-11-21 NOTE — REVIEW OF SYSTEMS
[Earache] : earache [Fever] : no fever [Night Sweats] : no night sweats [Discharge] : no discharge [Vision Problems] : no vision problems [Nasal Discharge] : no nasal discharge [Chest Pain] : no chest pain [Orthopnea] : no orthopnea [Shortness Of Breath] : no shortness of breath [Abdominal Pain] : no abdominal pain [Vomiting] : no vomiting [Dysuria] : no dysuria [Hematuria] : no hematuria [Joint Pain] : no joint pain [Muscle Pain] : no muscle pain [Itching] : no itching [Skin Rash] : no skin rash [Headache] : no headache [Memory Loss] : no memory loss [Suicidal] : not suicidal [Easy Bleeding] : no easy bleeding [FreeTextEntry7] : feeling of food getting stuck in throat 2 times a day

## 2019-12-01 ENCOUNTER — INPATIENT (INPATIENT)
Facility: HOSPITAL | Age: 34
LOS: 2 days | Discharge: HOME | DRG: 871 | End: 2019-12-04
Attending: STUDENT IN AN ORGANIZED HEALTH CARE EDUCATION/TRAINING PROGRAM | Admitting: SPECIALIST
Payer: MEDICAID

## 2019-12-01 VITALS
DIASTOLIC BLOOD PRESSURE: 50 MMHG | RESPIRATION RATE: 20 BRPM | OXYGEN SATURATION: 98 % | TEMPERATURE: 107 F | SYSTOLIC BLOOD PRESSURE: 87 MMHG | HEART RATE: 172 BPM

## 2019-12-01 DIAGNOSIS — R50.9 FEVER, UNSPECIFIED: ICD-10-CM

## 2019-12-01 LAB
ALBUMIN SERPL ELPH-MCNC: 3.9 G/DL — SIGNIFICANT CHANGE UP (ref 3.3–5)
ALBUMIN SERPL ELPH-MCNC: 4.4 G/DL — SIGNIFICANT CHANGE UP (ref 3.3–5)
ALP SERPL-CCNC: 83 U/L — SIGNIFICANT CHANGE UP (ref 40–120)
ALP SERPL-CCNC: 91 U/L — SIGNIFICANT CHANGE UP (ref 40–120)
ALT FLD-CCNC: 39 U/L — SIGNIFICANT CHANGE UP (ref 10–45)
ALT FLD-CCNC: 40 U/L — SIGNIFICANT CHANGE UP (ref 10–45)
ANION GAP SERPL CALC-SCNC: 14 MMOL/L — SIGNIFICANT CHANGE UP (ref 5–17)
ANION GAP SERPL CALC-SCNC: 19 MMOL/L — HIGH (ref 5–17)
APAP SERPL-MCNC: <15 UG/ML — SIGNIFICANT CHANGE UP (ref 10–30)
APPEARANCE CSF: CLEAR — SIGNIFICANT CHANGE UP
APPEARANCE SPUN FLD: COLORLESS — SIGNIFICANT CHANGE UP
APPEARANCE UR: ABNORMAL
APTT BLD: 27.6 SEC — SIGNIFICANT CHANGE UP (ref 27.5–36.3)
AST SERPL-CCNC: 21 U/L — SIGNIFICANT CHANGE UP (ref 10–40)
AST SERPL-CCNC: 43 U/L — HIGH (ref 10–40)
BASOPHILS # BLD AUTO: 0.04 K/UL — SIGNIFICANT CHANGE UP (ref 0–0.2)
BASOPHILS NFR BLD AUTO: 0.3 % — SIGNIFICANT CHANGE UP (ref 0–2)
BILIRUB SERPL-MCNC: 1 MG/DL — SIGNIFICANT CHANGE UP (ref 0.2–1.2)
BILIRUB SERPL-MCNC: 1.1 MG/DL — SIGNIFICANT CHANGE UP (ref 0.2–1.2)
BILIRUB UR-MCNC: NEGATIVE — SIGNIFICANT CHANGE UP
BUN SERPL-MCNC: 10 MG/DL — SIGNIFICANT CHANGE UP (ref 7–23)
BUN SERPL-MCNC: 9 MG/DL — SIGNIFICANT CHANGE UP (ref 7–23)
CALCIUM SERPL-MCNC: 10.1 MG/DL — SIGNIFICANT CHANGE UP (ref 8.4–10.5)
CALCIUM SERPL-MCNC: 7.8 MG/DL — LOW (ref 8.4–10.5)
CHLORIDE SERPL-SCNC: 103 MMOL/L — SIGNIFICANT CHANGE UP (ref 96–108)
CHLORIDE SERPL-SCNC: 103 MMOL/L — SIGNIFICANT CHANGE UP (ref 96–108)
CK SERPL-CCNC: 755 U/L — HIGH (ref 25–170)
CO2 SERPL-SCNC: 15 MMOL/L — LOW (ref 22–31)
CO2 SERPL-SCNC: 16 MMOL/L — LOW (ref 22–31)
COLOR CSF: SIGNIFICANT CHANGE UP
COLOR SPEC: YELLOW — SIGNIFICANT CHANGE UP
CREAT SERPL-MCNC: 0.57 MG/DL — SIGNIFICANT CHANGE UP (ref 0.5–1.3)
CREAT SERPL-MCNC: 0.72 MG/DL — SIGNIFICANT CHANGE UP (ref 0.5–1.3)
DIFF PNL FLD: ABNORMAL
EOSINOPHIL # BLD AUTO: 0.04 K/UL — SIGNIFICANT CHANGE UP (ref 0–0.5)
EOSINOPHIL NFR BLD AUTO: 0.3 % — SIGNIFICANT CHANGE UP (ref 0–6)
ETHANOL SERPL-MCNC: SIGNIFICANT CHANGE UP MG/DL (ref 0–10)
GAS PNL BLDA: SIGNIFICANT CHANGE UP
GAS PNL BLDV: SIGNIFICANT CHANGE UP
GLUCOSE CSF-MCNC: 86 MG/DL — HIGH (ref 40–70)
GLUCOSE SERPL-MCNC: 144 MG/DL — HIGH (ref 70–99)
GLUCOSE SERPL-MCNC: 233 MG/DL — HIGH (ref 70–99)
GLUCOSE UR QL: NEGATIVE — SIGNIFICANT CHANGE UP
GRAM STN FLD: SIGNIFICANT CHANGE UP
HCT VFR BLD CALC: 39.4 % — SIGNIFICANT CHANGE UP (ref 34.5–45)
HGB BLD-MCNC: 14.5 G/DL — SIGNIFICANT CHANGE UP (ref 11.5–15.5)
IMM GRANULOCYTES NFR BLD AUTO: 0.3 % — SIGNIFICANT CHANGE UP (ref 0–1.5)
INR BLD: 1.08 RATIO — SIGNIFICANT CHANGE UP (ref 0.88–1.16)
KETONES UR-MCNC: ABNORMAL
LEUKOCYTE ESTERASE UR-ACNC: ABNORMAL
LYMPHOCYTES # BLD AUTO: 32.5 % — SIGNIFICANT CHANGE UP (ref 13–44)
LYMPHOCYTES # BLD AUTO: 4.79 K/UL — HIGH (ref 1–3.3)
LYMPHOCYTES # CSF: 32 % — LOW (ref 40–80)
MAGNESIUM SERPL-MCNC: 1.9 MG/DL — SIGNIFICANT CHANGE UP (ref 1.6–2.6)
MCHC RBC-ENTMCNC: 34.2 PG — HIGH (ref 27–34)
MCHC RBC-ENTMCNC: 36.8 GM/DL — HIGH (ref 32–36)
MCV RBC AUTO: 92.9 FL — SIGNIFICANT CHANGE UP (ref 80–100)
MONOCYTES # BLD AUTO: 0.5 K/UL — SIGNIFICANT CHANGE UP (ref 0–0.9)
MONOCYTES NFR BLD AUTO: 3.4 % — SIGNIFICANT CHANGE UP (ref 2–14)
MONOS+MACROS NFR CSF: 36 % — SIGNIFICANT CHANGE UP (ref 15–45)
NEUTROPHILS # BLD AUTO: 9.3 K/UL — HIGH (ref 1.8–7.4)
NEUTROPHILS # CSF: 32 % — HIGH (ref 0–6)
NEUTROPHILS NFR BLD AUTO: 63.2 % — SIGNIFICANT CHANGE UP (ref 43–77)
NITRITE UR-MCNC: NEGATIVE — SIGNIFICANT CHANGE UP
NRBC # BLD: 0 /100 WBCS — SIGNIFICANT CHANGE UP (ref 0–0)
NRBC NFR CSF: 1 /UL — SIGNIFICANT CHANGE UP (ref 0–5)
PH UR: 6 — SIGNIFICANT CHANGE UP (ref 5–8)
PHOSPHATE SERPL-MCNC: 1.9 MG/DL — LOW (ref 2.5–4.5)
PLATELET # BLD AUTO: 170 K/UL — SIGNIFICANT CHANGE UP (ref 150–400)
POTASSIUM SERPL-MCNC: 3.6 MMOL/L — SIGNIFICANT CHANGE UP (ref 3.5–5.3)
POTASSIUM SERPL-MCNC: 4.1 MMOL/L — SIGNIFICANT CHANGE UP (ref 3.5–5.3)
POTASSIUM SERPL-SCNC: 3.6 MMOL/L — SIGNIFICANT CHANGE UP (ref 3.5–5.3)
POTASSIUM SERPL-SCNC: 4.1 MMOL/L — SIGNIFICANT CHANGE UP (ref 3.5–5.3)
PROCALCITONIN SERPL-MCNC: 20.23 NG/ML — HIGH (ref 0.02–0.1)
PROT CSF-MCNC: 17 MG/DL — SIGNIFICANT CHANGE UP (ref 15–45)
PROT SERPL-MCNC: 6.6 G/DL — SIGNIFICANT CHANGE UP (ref 6–8.3)
PROT SERPL-MCNC: 7.8 G/DL — SIGNIFICANT CHANGE UP (ref 6–8.3)
PROT UR-MCNC: ABNORMAL
PROTHROM AB SERPL-ACNC: 12.4 SEC — SIGNIFICANT CHANGE UP (ref 10–12.9)
RAPID RVP RESULT: SIGNIFICANT CHANGE UP
RBC # BLD: 4.24 M/UL — SIGNIFICANT CHANGE UP (ref 3.8–5.2)
RBC # CSF: 2 /UL — HIGH (ref 0–0)
RBC # FLD: 11.8 % — SIGNIFICANT CHANGE UP (ref 10.3–14.5)
SALICYLATES SERPL-MCNC: <2 MG/DL — LOW (ref 15–30)
SODIUM SERPL-SCNC: 132 MMOL/L — LOW (ref 135–145)
SODIUM SERPL-SCNC: 138 MMOL/L — SIGNIFICANT CHANGE UP (ref 135–145)
SP GR SPEC: 1.02 — SIGNIFICANT CHANGE UP (ref 1.01–1.02)
TSH SERPL-MCNC: 1.7 UIU/ML — SIGNIFICANT CHANGE UP (ref 0.27–4.2)
TUBE TYPE: SIGNIFICANT CHANGE UP
UROBILINOGEN FLD QL: NEGATIVE — SIGNIFICANT CHANGE UP
WBC # BLD: 14.72 K/UL — HIGH (ref 3.8–10.5)
WBC # FLD AUTO: 14.72 K/UL — HIGH (ref 3.8–10.5)

## 2019-12-01 PROCEDURE — 93010 ELECTROCARDIOGRAM REPORT: CPT | Mod: 59

## 2019-12-01 PROCEDURE — 70450 CT HEAD/BRAIN W/O DYE: CPT | Mod: 26

## 2019-12-01 PROCEDURE — 99291 CRITICAL CARE FIRST HOUR: CPT

## 2019-12-01 PROCEDURE — 71045 X-RAY EXAM CHEST 1 VIEW: CPT | Mod: 26

## 2019-12-01 PROCEDURE — 74177 CT ABD & PELVIS W/CONTRAST: CPT | Mod: 26

## 2019-12-01 PROCEDURE — 31500 INSERT EMERGENCY AIRWAY: CPT

## 2019-12-01 PROCEDURE — 71260 CT THORAX DX C+: CPT | Mod: 26

## 2019-12-01 PROCEDURE — 62270 DX LMBR SPI PNXR: CPT

## 2019-12-01 PROCEDURE — 99291 CRITICAL CARE FIRST HOUR: CPT | Mod: 25

## 2019-12-01 RX ORDER — CHLORHEXIDINE GLUCONATE 213 G/1000ML
15 SOLUTION TOPICAL EVERY 12 HOURS
Refills: 0 | Status: DISCONTINUED | OUTPATIENT
Start: 2019-12-01 | End: 2019-12-02

## 2019-12-01 RX ORDER — PROPOFOL 10 MG/ML
20 INJECTION, EMULSION INTRAVENOUS
Qty: 500 | Refills: 0 | Status: DISCONTINUED | OUTPATIENT
Start: 2019-12-01 | End: 2019-12-02

## 2019-12-01 RX ORDER — MIDAZOLAM HYDROCHLORIDE 1 MG/ML
2 INJECTION, SOLUTION INTRAMUSCULAR; INTRAVENOUS ONCE
Refills: 0 | Status: DISCONTINUED | OUTPATIENT
Start: 2019-12-01 | End: 2019-12-01

## 2019-12-01 RX ORDER — ACYCLOVIR SODIUM 500 MG
1000 VIAL (EA) INTRAVENOUS ONCE
Refills: 0 | Status: COMPLETED | OUTPATIENT
Start: 2019-12-01 | End: 2019-12-01

## 2019-12-01 RX ORDER — ETOMIDATE 2 MG/ML
20 INJECTION INTRAVENOUS ONCE
Refills: 0 | Status: COMPLETED | OUTPATIENT
Start: 2019-12-01 | End: 2019-12-01

## 2019-12-01 RX ORDER — LEVETIRACETAM 250 MG/1
1000 TABLET, FILM COATED ORAL ONCE
Refills: 0 | Status: COMPLETED | OUTPATIENT
Start: 2019-12-01 | End: 2019-12-01

## 2019-12-01 RX ORDER — CEFEPIME 1 G/1
2000 INJECTION, POWDER, FOR SOLUTION INTRAMUSCULAR; INTRAVENOUS EVERY 8 HOURS
Refills: 0 | Status: DISCONTINUED | OUTPATIENT
Start: 2019-12-01 | End: 2019-12-04

## 2019-12-01 RX ORDER — CEFEPIME 1 G/1
2000 INJECTION, POWDER, FOR SOLUTION INTRAMUSCULAR; INTRAVENOUS ONCE
Refills: 0 | Status: COMPLETED | OUTPATIENT
Start: 2019-12-01 | End: 2019-12-01

## 2019-12-01 RX ORDER — SODIUM CHLORIDE 9 MG/ML
1000 INJECTION, SOLUTION INTRAVENOUS
Refills: 0 | Status: DISCONTINUED | OUTPATIENT
Start: 2019-12-01 | End: 2019-12-02

## 2019-12-01 RX ORDER — VANCOMYCIN HCL 1 G
1000 VIAL (EA) INTRAVENOUS ONCE
Refills: 0 | Status: COMPLETED | OUTPATIENT
Start: 2019-12-01 | End: 2019-12-01

## 2019-12-01 RX ORDER — ACETAMINOPHEN 500 MG
650 TABLET ORAL ONCE
Refills: 0 | Status: COMPLETED | OUTPATIENT
Start: 2019-12-01 | End: 2019-12-01

## 2019-12-01 RX ORDER — CEFTRIAXONE 500 MG/1
2000 INJECTION, POWDER, FOR SOLUTION INTRAMUSCULAR; INTRAVENOUS ONCE
Refills: 0 | Status: COMPLETED | OUTPATIENT
Start: 2019-12-01 | End: 2019-12-01

## 2019-12-01 RX ORDER — VANCOMYCIN HCL 1 G
1250 VIAL (EA) INTRAVENOUS EVERY 12 HOURS
Refills: 0 | Status: DISCONTINUED | OUTPATIENT
Start: 2019-12-01 | End: 2019-12-02

## 2019-12-01 RX ORDER — CHLORHEXIDINE GLUCONATE 213 G/1000ML
1 SOLUTION TOPICAL
Refills: 0 | Status: DISCONTINUED | OUTPATIENT
Start: 2019-12-01 | End: 2019-12-04

## 2019-12-01 RX ORDER — MIDAZOLAM HYDROCHLORIDE 1 MG/ML
0.03 INJECTION, SOLUTION INTRAMUSCULAR; INTRAVENOUS
Qty: 100 | Refills: 0 | Status: DISCONTINUED | OUTPATIENT
Start: 2019-12-01 | End: 2019-12-02

## 2019-12-01 RX ORDER — ROCURONIUM BROMIDE 10 MG/ML
80 VIAL (ML) INTRAVENOUS ONCE
Refills: 0 | Status: COMPLETED | OUTPATIENT
Start: 2019-12-01 | End: 2019-12-01

## 2019-12-01 RX ORDER — SODIUM CHLORIDE 9 MG/ML
2000 INJECTION INTRAMUSCULAR; INTRAVENOUS; SUBCUTANEOUS ONCE
Refills: 0 | Status: COMPLETED | OUTPATIENT
Start: 2019-12-01 | End: 2019-12-01

## 2019-12-01 RX ORDER — SODIUM CHLORIDE 9 MG/ML
1000 INJECTION, SOLUTION INTRAVENOUS
Refills: 0 | Status: DISCONTINUED | OUTPATIENT
Start: 2019-12-01 | End: 2019-12-01

## 2019-12-01 RX ADMIN — PROPOFOL 9.6 MICROGRAM(S)/KG/MIN: 10 INJECTION, EMULSION INTRAVENOUS at 21:53

## 2019-12-01 RX ADMIN — SODIUM CHLORIDE 2000 MILLILITER(S): 9 INJECTION INTRAMUSCULAR; INTRAVENOUS; SUBCUTANEOUS at 19:45

## 2019-12-01 RX ADMIN — CEFEPIME 2000 MILLIGRAM(S): 1 INJECTION, POWDER, FOR SOLUTION INTRAMUSCULAR; INTRAVENOUS at 21:49

## 2019-12-01 RX ADMIN — Medication 650 MILLIGRAM(S): at 19:45

## 2019-12-01 RX ADMIN — ETOMIDATE 20 MILLIGRAM(S): 2 INJECTION INTRAVENOUS at 19:24

## 2019-12-01 RX ADMIN — LEVETIRACETAM 400 MILLIGRAM(S): 250 TABLET, FILM COATED ORAL at 20:58

## 2019-12-01 RX ADMIN — Medication 2 MILLIGRAM(S): at 20:31

## 2019-12-01 RX ADMIN — CEFEPIME 100 MILLIGRAM(S): 1 INJECTION, POWDER, FOR SOLUTION INTRAMUSCULAR; INTRAVENOUS at 19:00

## 2019-12-01 RX ADMIN — Medication 80 MILLIGRAM(S): at 19:25

## 2019-12-01 RX ADMIN — Medication 650 MILLIGRAM(S): at 19:00

## 2019-12-01 RX ADMIN — SODIUM CHLORIDE 2000 MILLILITER(S): 9 INJECTION INTRAMUSCULAR; INTRAVENOUS; SUBCUTANEOUS at 19:00

## 2019-12-01 RX ADMIN — Medication 4 MILLIGRAM(S): at 20:41

## 2019-12-01 RX ADMIN — MIDAZOLAM HYDROCHLORIDE 2 MILLIGRAM(S): 1 INJECTION, SOLUTION INTRAMUSCULAR; INTRAVENOUS at 19:00

## 2019-12-01 RX ADMIN — MIDAZOLAM HYDROCHLORIDE 2 MG/KG/HR: 1 INJECTION, SOLUTION INTRAMUSCULAR; INTRAVENOUS at 21:03

## 2019-12-01 RX ADMIN — Medication 250 MILLIGRAM(S): at 21:42

## 2019-12-01 RX ADMIN — Medication 170 MILLIGRAM(S): at 19:45

## 2019-12-01 RX ADMIN — Medication 1000 MILLIGRAM(S): at 21:49

## 2019-12-01 NOTE — ED ADULT NURSE NOTE - NSIMPLEMENTINTERV_GEN_ALL_ED
Implemented All Universal Safety Interventions:  [place] to call system. Call bell, personal items and telephone within reach. Instruct patient to call for assistance. Room bathroom lighting operational. Non-slip footwear when patient is off stretcher. Physically safe environment: no spills, clutter or unnecessary equipment. Stretcher in lowest position, wheels locked, appropriate side rails in place.

## 2019-12-01 NOTE — ED PROVIDER NOTE - PROGRESS NOTE DETAILS
patient extremely agitated despite verbal de-esclation. pulling out all of her IV lines and requiring 7-8 people to restrain. attempted versed but no change. patient requires emergent imaging and treatment, will intubate for airway protection and patient safety for CT imaging

## 2019-12-01 NOTE — ED PROVIDER NOTE - PHYSICAL EXAMINATION
Gen: agitated, altered, uncooperative  HENT: Normocephalic, atraumatic.   Eyes: PERRL  Resp: CTAB  CV: tachy   Abd: soft, tender abdomen  MSK: moving all extremities  Skin: warm and dry  Neuro: agitated, speech clear, uncooperative, altered, confused Gen: agitated, altered, uncooperative  HENT: Normocephalic, atraumatic.   Eyes: PERRL  Resp: CTAB  CV: tachy   Abd: soft, tender abdomen  MSK: moving all extremities, no stiffness, rigidity.  Skin: warm and wet, diaphoretic  Neuro: agitated, speech clear, uncooperative, altered, confused

## 2019-12-01 NOTE — ED ADULT NURSE REASSESSMENT NOTE - NS ED NURSE REASSESS COMMENT FT1
4 mg IVP ativan administered and pt initiated on versed infusion for sedation per MD and MICU orders.

## 2019-12-01 NOTE — ED PROVIDER NOTE - OBJECTIVE STATEMENT
33yo no pmh presenting with  for abdominal pain, vomiting, chills since this morning and confusion this afternoon. Patient was not confused in the early morning per  but more confused prior to arrival. Patient agitated, confused, uncooperative unable to give history.  denies any drug/etoh use now or in the past. No other meds at home per  either. Has been well yesterday with no other complaints per . 33yo no pmh presenting with  for abdominal pain, vomiting, chills since this morning. Patient was not confused in the early morning per  but more confused right prior to arrival when she vomited in the car. Patient arrived agitated, confused, uncooperative unable to give history.  denies any drug/etoh use now or in the past. No other meds at home per  either. Has been well yesterday with no other complaints per .

## 2019-12-01 NOTE — ED ADULT NURSE REASSESSMENT NOTE - NS ED NURSE REASSESS COMMENT FT1
Pt placed on cooling blanket, ice packs placed in axilla and groin d/t high fever. Pt highly agitated, pulling at IVs and gunn cath. MD at bedside Versed administered per order w/ no change in pt status. Pt remains confused and uncooperative.

## 2019-12-01 NOTE — ED ADULT NURSE NOTE - OBJECTIVE STATEMENT
34 yr old female was brought in by her  after complaining of abd pain and sweating. he brought her into today and she became altered in triage with hr 170s. on assessment  pt is totally confused having hallucinations'. very sweaty combative not making any sense. no h/o of any medical problems or medications,  denies drug or alcohol use. no recent travel.

## 2019-12-01 NOTE — ED PROVIDER NOTE - CARE PLAN
Principal Discharge DX:	Fever  Secondary Diagnosis:	Tachycardia  Secondary Diagnosis:	Agitation  Secondary Diagnosis:	UTI (urinary tract infection)

## 2019-12-01 NOTE — ED ADULT NURSE REASSESSMENT NOTE - NS ED NURSE REASSESS COMMENT FT1
Pt intubated by MD Molina at 1926 w/ 7.5 ET tube, 19cm @ lip line. Pt preoxygenated w/ 100% O2 via BVM. Pt premedicated by MD Lara w/ 20 mg etomidate @ 1924, 80 mg roccuronium @ 1925. Tube placement confirmed w/ +color change capenography, +B/L breath sounds. Stat CXR called to confirm tube placement. Propofol infusion initiated for continued sedation at 5 mcg/kg/min.

## 2019-12-01 NOTE — H&P ADULT - NSHPPHYSICALEXAM_GEN_ALL_CORE
PHYSICAL EXAM:  GENERAL: Obese  HEAD:  Atraumatic, Normocephalic  EYES: + mydriasis  NECK: Supple, No JVD  CHEST/LUNG: Clear to auscultation bilaterally; No wheeze  HEART: + tachycardia, no m/r/g  ABDOMEN: Soft, hyperactive bowel sounds  EXTREMITIES:  2+ Peripheral Pulses, No clubbing, cyanosis, or edema  NEUROLOGY: unable to assess

## 2019-12-01 NOTE — ED PROCEDURE NOTE - ATTENDING CONTRIBUTION TO CARE
I have participated in and supervised all key portions of the above procedures and agree with the above documentation.

## 2019-12-01 NOTE — H&P ADULT - NSHPSOCIALHISTORY_GEN_ALL_CORE
lives with  and 16 month old child (born 8/2018)  Works part-time at [employer].  Per , patient has no history of ETOH or illicit drug use

## 2019-12-01 NOTE — ED ADULT NURSE REASSESSMENT NOTE - NS ED NURSE REASSESS COMMENT FT1
2 mg IVP ativan administered for suspected seizure per MD order. Twitching improved somewhat. Pt over breathing vent, MD aware, at bedside for eval.   MICU resident at bedside as well for pt eval.

## 2019-12-01 NOTE — H&P ADULT - ATTENDING COMMENTS
34F Colombian-speaking pt Hx Multiple ED visits, Hypothyroidism, Obesity BIB  for 1 day Hx of Chills, Generalized Abdominal Pain, High Fever 107* F rectally, Diaphoresis, Tachyarrhythmia 170s and Extreme Agitation requiring intubation, Ventilator Support and Hypothermic [place] placement. Her fever then dropped to 102*F associated with brief Myoclonic Seizure Activity treated with Ativan 6 mg IV and Propofol drip titration.   - Acute Hypoxic Respiratory Failure on Ventilator Support to f/u ABG and CXR   - Hyperthermia R/O Serotonin Syndrome vs. NMS (less likely give acute presentation)  - Seizure precaution and sedation plan with Propofol and Versed gtt with prn Ativan IVP for breakthrough Clinical Seizures   - CT Head negative and vEEG requested pending Toxicology Screen and Sepsis w/u   - Empiric ABx coverage for Meningitis, Encephalitis after LP for CSF Study and Panculture   - IV Hydration and close JB monitoring   - Send TSH and FT4 Level given Hx of stopping Synthroid x 1 week for conception   - Notable Urine UA positive need CT Abdomen and Pelvis F/U done in ED

## 2019-12-01 NOTE — ED ADULT NURSE REASSESSMENT NOTE - NS ED NURSE REASSESS COMMENT FT1
Pt remains unresponsive on propofol infusion. Global twitching noted to all extremities, MD Lara aware and at bedside for assessment. Pupils 4 mm and sluggish.

## 2019-12-01 NOTE — H&P ADULT - ASSESSMENT
35 y/o Hebrew-speaking female with medical history significant for hypothyroidism and obesity brought in from home for one day history of abdominal pain and chills, found to be febrile to 107, tachycardic, severe diaphoresis at [facility] ED.  MICU consulted for malignant hyperthermia (fever to 107 decreased to 100.2 s/p hypothermic blankets), increased agitation and confusion s/p intubation, unclear etiology of acute decompensation.      #NEURO  -On presentation to ED, patient was altered, confused.  -Altered mental status and acute decompensation likely secondary to high grade fevers, sepsis of unknown etiology, questionable serotonin syndrome? needs further investigation.  -Currently sedated with propofol and versed gtt.  -Benzodiazipine gtt preferred over fentanyl as serotonin syndrome is highest on the differential  -LP pending    #CVS  Tachycardic and hypertensive on presentation likely secondary to serotonin syndrome.    #RESP  -s/p intubation for severe agitation, altered mental state and airway protection precaution.  -Continue with sedation while intubated.    #GI  -No active issues    #RENAL  -No active issues    #HEME  -Leukocytosis, WBC of 14.72  -Platelets and Hgb stable and WNL    #ENDO  -Patient has history of elevated TSH for which she was taking unknown medication for past two months; however, stopped taking the medication in the last week as she was actively trying to get pregnant. (Patient is NOT pregnant)  -Patient has no endocrinologist as outpatient.    #ID  -Sepsis of unclear etiology  -Urinalysis positive  -Blood and urine cultures collected, awaiting results, continue to follow.  -RVP pending  -s/p CT head, chest, abdomen/pelvis; follow-up results.    #GOC  FULL CODE 33 y/o Hebrew-speaking female with medical history significant for hypothyroidism and obesity brought in from home for one day history of abdominal pain and chills, found to be febrile to 107, tachycardic, severe diaphoresis at [facility] ED.  MICU consulted for malignant hyperthermia (fever to 107 decreased to 100.2 s/p hypothermic blankets), increased agitation and confusion s/p intubation, unclear etiology of acute decompensation.      #NEURO  -On presentation to ED, patient was altered, confused.  -Altered mental status and acute decompensation likely secondary to high grade fevers, sepsis of unknown etiology, questionable serotonin syndrome? needs further investigation.  -Currently sedated with propofol and versed gtt.  -Benzodiazapine gtt preferred over fentanyl as serotonin syndrome is highest on the differential  -LP performed, f/u results    #CVS  Tachycardic and hypertensive on presentation possibly secondary to serotonin syndrome vs fevers vs agitation    #RESP  -s/p intubation for severe agitation, altered mental state and airway protection precaution.  -Continue with sedation while intubated.    #GI  -No active issues  -NPO for now in setting of intubation    #RENAL  -No active issues    #HEME  -Leukocytosis, WBC of 14.72  -Platelets and Hgb stable and WNL    #ENDO  -Patient has history of elevated TSH for which she was taking unknown medication in the past however, stopped taking the medication in the last week as she was actively trying to get pregnant. (Patient is NOT pregnant). Most recent TSH on 10/9 was 2.47 on outpatient labs.  -Patient has no endocrinologist as outpatient.    #ID  -Sepsis of unclear etiology  -Urinalysis positive  -Blood and urine cultures collected, awaiting results, continue to follow.  -RVP pending  -s/p CT head, chest, abdomen/pelvis; follow-up results  -empiric coverage with cefepime and vancomycin for now    #GOC  FULL CODE

## 2019-12-01 NOTE — H&P ADULT - NSHPREVIEWOFSYSTEMS_GEN_ALL_CORE
REVIEW OF SYSTEMS:    CONSTITUTIONAL: + fevers or chills  EYES/ENT: No visual changes;  No vertigo or throat pain   NECK: No pain or stiffness  RESPIRATORY: No cough, wheezing, hemoptysis; No shortness of breath  CARDIOVASCULAR: No chest pain or palpitations  GASTROINTESTINAL: + abdominal pain  GENITOURINARY: No dysuria, frequency or hematuria  NEUROLOGICAL: agitation and confusion

## 2019-12-01 NOTE — ED PROVIDER NOTE - ATTENDING CONTRIBUTION TO CARE
34 yof pmh obesity brought by  for abdominal pain and vomiting. Reports chills earlier in the morning around 3am. Then went to work and was ok but called  in the evening that she was not feeling well, had abdominal pain and vomiting. Took 2 motrin prior to coming in today. Denies taking any other medication. Denies any drugs. Denies recent travel or sick contacts. Recently got flu shot. Patient unable to provide history, obtained from   AP - patient altered, febrile to 107 rectally and tachycardic to 180s. BP stable. cooling blanket placed immediately with ice packs. empiric abx given. IVF. patient continued to be altered, unable to restrain on bed and pulling out IVs. decision made to intubate patient for safety and CTs.  denying all tox. ddx including malignant hyperthermia vs. sepsis vs. tox. MICU consult. admit 34 yof pmh obesity brought by  for abdominal pain and vomiting. Reports chills earlier in the morning around 3am. Then went to work and was ok but called  in the evening that she was not feeling well, had abdominal pain and vomiting. Took 2 motrin prior to coming in today. Denies taking any other medication. Denies any drugs. Denies recent travel or sick contacts. Recently got flu shot. Patient unable to provide history, obtained from   AP - patient altered, febrile to 107 rectally and tachycardic to 180s. BP stable. cooling blanket placed immediately with ice packs. empiric abx given. IVF. patient continued to be altered, unable to restrain on bed and pulling out IVs. decision made to intubate patient for safety and CTs.  denying all tox. ddx including malignant hyperthermia vs. serotonin syndrome vs. sepsis vs. tox. MICU consult. admit

## 2019-12-01 NOTE — ED ADULT NURSE REASSESSMENT NOTE - NS ED NURSE REASSESS COMMENT FT1
LP completed by MD Molina and Jane. Samples obtained and sent to lab. MICU report called, pt prepared for transport. ED resident, RN, EDT, and RT at bedside for transport. Family at bedside, aware of plan of care.

## 2019-12-01 NOTE — ED PROVIDER NOTE - CLINICAL SUMMARY MEDICAL DECISION MAKING FREE TEXT BOX
35yo with vomiting, chills brought in by  for confusion. Agitated and altered in the ED with temp 107. Sepsis workup. Tox possible, but  denies any meds in the house and no prior history.

## 2019-12-01 NOTE — H&P ADULT - NSHPLABSRESULTS_GEN_ALL_CORE
LABS:                        14.5   14.72 )-----------( 170      ( 01 Dec 2019 19:05 )             39.4     12    138  |  103  |  10  ----------------------------<  144<H>  3.6   |  16<L>  |  0.72    Ca    10.1      01 Dec 2019 18:56  Mg     1.8         TPro  7.8  /  Alb  4.4  /  TBili  1.0  /  DBili  x   /  AST  21  /  ALT  40  /  AlkPhos  91      PT/INR - ( 01 Dec 2019 19:20 )   PT: 12.4 sec;   INR: 1.08 ratio         PTT - ( 01 Dec 2019 19:20 )  PTT:27.6 sec  CARDIAC MARKERS ( 01 Dec 2019 18:56 )  x     / x     / 146 U/L / x     / x          Urinalysis Basic - ( 01 Dec 2019 19:24 )    Color: Yellow / Appearance: Slightly Turbid / S.016 / pH: x  Gluc: x / Ketone: Small  / Bili: Negative / Urobili: Negative   Blood: x / Protein: 30 mg/dL / Nitrite: Negative   Leuk Esterase: Large / RBC: 13 /hpf /  /HPF   Sq Epi: x / Non Sq Epi: 8 /hpf / Bacteria: Moderate        RADIOLOGY & ADDITIONAL TESTS:    < from: Xray Chest 1 View AP/PA (19 @ 19:44) >    ******PRELIMINARY REPORT******    ******PRELIMINARY REPORT******          EXAM:  XR CHEST AP OR PA 1V                            PROCEDURE DATE:  2019      ******PRELIMINARY REPORT******    ******PRELIMINARY REPORT******              INTERPRETATION:  ETT terminates above the rex on the second image.   Enteric tube terminates in the stomach. Clear lungs.              ******PRELIMINARY REPORT******    ******PRELIMINARY REPORT******          DEEJAY DENT M.D., RADIOLOGY RESIDENT    < end of copied text > LABS:                        14.5   14.72 )-----------( 170      ( 01 Dec 2019 19:05 )             39.4     12    138  |  103  |  10  ----------------------------<  144<H>  3.6   |  16<L>  |  0.72    Ca    10.1      01 Dec 2019 18:56  Mg     1.8         TPro  7.8  /  Alb  4.4  /  TBili  1.0  /  DBili  x   /  AST  21  /  ALT  40  /  AlkPhos  91      PT/INR - ( 01 Dec 2019 19:20 )   PT: 12.4 sec;   INR: 1.08 ratio       PTT - ( 01 Dec 2019 19:20 )  PTT:27.6 sec  CARDIAC MARKERS ( 01 Dec 2019 18:56 )  x     / x     / 146 U/L / x     / x          Urinalysis Basic - ( 01 Dec 2019 19:24 )    Color: Yellow / Appearance: Slightly Turbid / S.016 / pH: x  Gluc: x / Ketone: Small  / Bili: Negative / Urobili: Negative   Blood: x / Protein: 30 mg/dL / Nitrite: Negative   Leuk Esterase: Large / RBC: 13 /hpf /  /HPF   Sq Epi: x / Non Sq Epi: 8 /hpf / Bacteria: Moderate    RADIOLOGY & ADDITIONAL TESTS:    < from: Xray Chest 1 View AP/PA (19 @ 19:44) >    ******PRELIMINARY REPORT******    ******PRELIMINARY REPORT******          EXAM:  XR CHEST AP OR PA 1V                            PROCEDURE DATE:  2019      ******PRELIMINARY REPORT******    ******PRELIMINARY REPORT******          INTERPRETATION:  ETT terminates above the rex on the second image.   Enteric tube terminates in the stomach. Clear lungs.      ******PRELIMINARY REPORT******    ******PRELIMINARY REPORT******          DEEJAY DENT M.D., RADIOLOGY RESIDENT    < end of copied text >

## 2019-12-01 NOTE — H&P ADULT - HISTORY OF PRESENT ILLNESS
Information obtained via chart review and from patient's spouse:    35 y/o Welsh-speaking female with medical history significant for hypothyroidism and obesity brought in from home for one day history of abdominal pain and chills. The patient started having abdominal pain 5 :30 pm yesterday that spontaneously resolved.  This morning the patient went to work feeling better; however later in the day the abdominal pain returned.  Per , the patient described the abdominal pain as located in all 4 quadrants simultaneously patient became more altered not knowing the year but oriented to self and place.  called EMS; however, instructed by police to drop patient off at nearest emergency room.  Upon presentation to the ED tonight, patient became increasingly agitated and confused.  ED physician gave patient versed; however, patient was still agitated and violent; had 6 staff members hold her down to bed.  Decision was made by ED attending to have patient intubated and sedated for airway protection, further work-up and IV access.    MICU consulted malignant hyperthermia (fever to 107 decreased to 100.2 s/p hypothermic blankets), increased agitation and confusion s/p intubation, unclear etiology of acute decompensation.    Upon chart review, patient has multiple ED visits in the past for primary care issues.  Last visit was approximately 9/2019 for throat pain and difficulty swallowing, was found to have a goiter and hypothyroidism and was discharged with instructions for endocrinology and PCP work up. ENT was consulted at that time and deemed that no acute ENT intervention needed as laryngoscopy was done at bedside and revealed normal exam, patent airway and no foreign body. Information obtained via chart review and from patient's spouse:    35 y/o Nepali-speaking female with medical history significant for hypothyroidism and obesity brought in from home for one day history of abdominal pain and chills. The patient started having abdominal pain 5 :30 pm yesterday that spontaneously resolved.  This morning the patient went to work feeling better; however later in the day the abdominal pain returned.  Per , the patient described the abdominal pain as located in all 4 quadrants simultaneously patient became more altered not knowing the year but oriented to self and place.  called EMS; however, instructed by police to drop patient off at nearest emergency room.  Upon presentation to the ED tonight, patient became increasingly agitated and confused.  ED physician gave patient versed; however, patient was still agitated and violent; had 6 staff members hold her down to bed.  Decision was made by ED attending to have patient intubated and sedated for airway protection, further work-up and IV access.    MICU consulted for malignant hyperthermia (fever to 107 decreased to 100.2 s/p hypothermic blankets), increased agitation and confusion s/p intubation, unclear etiology of acute decompensation.    Upon chart review, patient has multiple ED visits in the past for primary care issues.  Last visit was approximately 9/2019 for throat pain and difficulty swallowing, was found to have a goiter and hypothyroidism and was discharged with instructions for endocrinology and PCP work up. ENT was consulted at that time and deemed that no acute ENT intervention needed as laryngoscopy was done at bedside and revealed normal exam, patent airway and no foreign body. Information obtained via chart review and from patient's spouse:    33 y/o Turkish-speaking female with medical history significant for GERD, hypothyroidism and obesity brought in from home for one day history of abdominal pain and chills. The patient started having abdominal pain 5 :30 pm yesterday that spontaneously resolved.  This morning the patient went to work feeling better; however later in the day the abdominal pain returned.  Per , the patient described the abdominal pain as located in all 4 quadrants simultaneously patient became more altered not knowing the year but oriented to self and place.  called EMS; however, instructed by police to drop patient off at nearest emergency room.  Upon presentation to the ED tonight, patient became increasingly agitated and confused.  ED physician gave patient versed; however, patient was still agitated and violent; had 6 staff members hold her down to bed.  Decision was made by ED attending to have patient intubated and sedated for airway protection, further work-up and IV access.    MICU consulted for malignant hyperthermia (fever to 107 decreased to 100.2 s/p hypothermic blankets), increased agitation and confusion s/p intubation, unclear etiology of acute decompensation.    Upon chart review, patient has multiple ED visits in the past for primary care issues.  Last visit was approximately 9/2019 for throat pain and difficulty swallowing, was found to have a goiter and hypothyroidism and was discharged with instructions for endocrinology and PCP work up. ENT was consulted at that time and deemed that no acute ENT intervention needed as laryngoscopy was done at bedside and revealed normal exam, patent airway and no foreign body.

## 2019-12-02 LAB
CSF PCR RESULT: SIGNIFICANT CHANGE UP
E COLI DNA BLD POS QL NAA+NON-PROBE: SIGNIFICANT CHANGE UP
GLUCOSE BLDC GLUCOMTR-MCNC: 106 MG/DL — HIGH (ref 70–99)
GLUCOSE BLDC GLUCOMTR-MCNC: 75 MG/DL — SIGNIFICANT CHANGE UP (ref 70–99)
GLUCOSE BLDC GLUCOMTR-MCNC: 97 MG/DL — SIGNIFICANT CHANGE UP (ref 70–99)
GRAM STN FLD: SIGNIFICANT CHANGE UP
METHOD TYPE: SIGNIFICANT CHANGE UP
SPECIMEN SOURCE: SIGNIFICANT CHANGE UP
SPECIMEN SOURCE: SIGNIFICANT CHANGE UP
T4 FREE SERPL-MCNC: 1 NG/DL — SIGNIFICANT CHANGE UP (ref 0.9–1.8)
TSH SERPL-MCNC: 1.41 UIU/ML — SIGNIFICANT CHANGE UP (ref 0.27–4.2)

## 2019-12-02 PROCEDURE — 76775 US EXAM ABDO BACK WALL LIM: CPT | Mod: 26,GC

## 2019-12-02 PROCEDURE — 71045 X-RAY EXAM CHEST 1 VIEW: CPT | Mod: 26

## 2019-12-02 PROCEDURE — 95816 EEG AWAKE AND DROWSY: CPT | Mod: 26

## 2019-12-02 PROCEDURE — 99291 CRITICAL CARE FIRST HOUR: CPT | Mod: 25

## 2019-12-02 PROCEDURE — 93308 TTE F-UP OR LMTD: CPT | Mod: 26,GC

## 2019-12-02 PROCEDURE — 76604 US EXAM CHEST: CPT | Mod: 26,GC

## 2019-12-02 RX ORDER — PROPOFOL 10 MG/ML
50 INJECTION, EMULSION INTRAVENOUS
Qty: 500 | Refills: 0 | Status: DISCONTINUED | OUTPATIENT
Start: 2019-12-02 | End: 2019-12-02

## 2019-12-02 RX ORDER — DEXTROSE 50 % IN WATER 50 %
25 SYRINGE (ML) INTRAVENOUS ONCE
Refills: 0 | Status: DISCONTINUED | OUTPATIENT
Start: 2019-12-02 | End: 2019-12-03

## 2019-12-02 RX ORDER — PANTOPRAZOLE SODIUM 20 MG/1
40 TABLET, DELAYED RELEASE ORAL
Refills: 0 | Status: DISCONTINUED | OUTPATIENT
Start: 2019-12-02 | End: 2019-12-04

## 2019-12-02 RX ORDER — SODIUM CHLORIDE 9 MG/ML
1000 INJECTION, SOLUTION INTRAVENOUS
Refills: 0 | Status: DISCONTINUED | OUTPATIENT
Start: 2019-12-02 | End: 2019-12-02

## 2019-12-02 RX ORDER — GLUCAGON INJECTION, SOLUTION 0.5 MG/.1ML
1 INJECTION, SOLUTION SUBCUTANEOUS ONCE
Refills: 0 | Status: DISCONTINUED | OUTPATIENT
Start: 2019-12-02 | End: 2019-12-03

## 2019-12-02 RX ORDER — POTASSIUM PHOSPHATE, MONOBASIC POTASSIUM PHOSPHATE, DIBASIC 236; 224 MG/ML; MG/ML
30 INJECTION, SOLUTION INTRAVENOUS ONCE
Refills: 0 | Status: COMPLETED | OUTPATIENT
Start: 2019-12-02 | End: 2019-12-02

## 2019-12-02 RX ORDER — ACETAMINOPHEN 500 MG
1000 TABLET ORAL ONCE
Refills: 0 | Status: COMPLETED | OUTPATIENT
Start: 2019-12-02 | End: 2019-12-02

## 2019-12-02 RX ORDER — OMEPRAZOLE 10 MG/1
1 CAPSULE, DELAYED RELEASE ORAL
Qty: 0 | Refills: 0 | DISCHARGE

## 2019-12-02 RX ORDER — MIDAZOLAM HYDROCHLORIDE 1 MG/ML
0.1 INJECTION, SOLUTION INTRAMUSCULAR; INTRAVENOUS
Qty: 100 | Refills: 0 | Status: DISCONTINUED | OUTPATIENT
Start: 2019-12-02 | End: 2019-12-02

## 2019-12-02 RX ORDER — ACETAMINOPHEN 500 MG
650 TABLET ORAL EVERY 6 HOURS
Refills: 0 | Status: DISCONTINUED | OUTPATIENT
Start: 2019-12-02 | End: 2019-12-03

## 2019-12-02 RX ORDER — SODIUM CHLORIDE 9 MG/ML
1000 INJECTION, SOLUTION INTRAVENOUS
Refills: 0 | Status: DISCONTINUED | OUTPATIENT
Start: 2019-12-02 | End: 2019-12-03

## 2019-12-02 RX ORDER — DEXTROSE 50 % IN WATER 50 %
12.5 SYRINGE (ML) INTRAVENOUS ONCE
Refills: 0 | Status: DISCONTINUED | OUTPATIENT
Start: 2019-12-02 | End: 2019-12-03

## 2019-12-02 RX ORDER — ENOXAPARIN SODIUM 100 MG/ML
40 INJECTION SUBCUTANEOUS DAILY
Refills: 0 | Status: DISCONTINUED | OUTPATIENT
Start: 2019-12-02 | End: 2019-12-04

## 2019-12-02 RX ORDER — INSULIN LISPRO 100/ML
VIAL (ML) SUBCUTANEOUS
Refills: 0 | Status: DISCONTINUED | OUTPATIENT
Start: 2019-12-02 | End: 2019-12-03

## 2019-12-02 RX ORDER — DEXTROSE 50 % IN WATER 50 %
15 SYRINGE (ML) INTRAVENOUS ONCE
Refills: 0 | Status: DISCONTINUED | OUTPATIENT
Start: 2019-12-02 | End: 2019-12-03

## 2019-12-02 RX ADMIN — CHLORHEXIDINE GLUCONATE 15 MILLILITER(S): 213 SOLUTION TOPICAL at 05:08

## 2019-12-02 RX ADMIN — POTASSIUM PHOSPHATE, MONOBASIC POTASSIUM PHOSPHATE, DIBASIC 83.33 MILLIMOLE(S): 236; 224 INJECTION, SOLUTION INTRAVENOUS at 04:53

## 2019-12-02 RX ADMIN — Medication 1000 MILLIGRAM(S): at 17:27

## 2019-12-02 RX ADMIN — Medication 650 MILLIGRAM(S): at 10:30

## 2019-12-02 RX ADMIN — MIDAZOLAM HYDROCHLORIDE 2 MG/KG/HR: 1 INJECTION, SOLUTION INTRAMUSCULAR; INTRAVENOUS at 04:55

## 2019-12-02 RX ADMIN — ENOXAPARIN SODIUM 40 MILLIGRAM(S): 100 INJECTION SUBCUTANEOUS at 12:00

## 2019-12-02 RX ADMIN — CEFEPIME 100 MILLIGRAM(S): 1 INJECTION, POWDER, FOR SOLUTION INTRAMUSCULAR; INTRAVENOUS at 22:53

## 2019-12-02 RX ADMIN — Medication 650 MILLIGRAM(S): at 19:39

## 2019-12-02 RX ADMIN — PROPOFOL 9.6 MICROGRAM(S)/KG/MIN: 10 INJECTION, EMULSION INTRAVENOUS at 04:54

## 2019-12-02 RX ADMIN — CEFEPIME 100 MILLIGRAM(S): 1 INJECTION, POWDER, FOR SOLUTION INTRAMUSCULAR; INTRAVENOUS at 13:59

## 2019-12-02 RX ADMIN — SODIUM CHLORIDE 125 MILLILITER(S): 9 INJECTION, SOLUTION INTRAVENOUS at 19:40

## 2019-12-02 RX ADMIN — Medication 400 MILLIGRAM(S): at 14:38

## 2019-12-02 RX ADMIN — SODIUM CHLORIDE 125 MILLILITER(S): 9 INJECTION, SOLUTION INTRAVENOUS at 08:55

## 2019-12-02 RX ADMIN — Medication 650 MILLIGRAM(S): at 09:26

## 2019-12-02 RX ADMIN — Medication 166.67 MILLIGRAM(S): at 09:32

## 2019-12-02 RX ADMIN — CHLORHEXIDINE GLUCONATE 1 APPLICATION(S): 213 SOLUTION TOPICAL at 05:47

## 2019-12-02 RX ADMIN — CEFEPIME 100 MILLIGRAM(S): 1 INJECTION, POWDER, FOR SOLUTION INTRAMUSCULAR; INTRAVENOUS at 05:08

## 2019-12-02 RX ADMIN — SODIUM CHLORIDE 125 MILLILITER(S): 9 INJECTION, SOLUTION INTRAVENOUS at 04:54

## 2019-12-02 NOTE — PROGRESS NOTE ADULT - ASSESSMENT
42 y/o F with hx of GERD, hypothyroidism and obesity that p/w abdominal pain, altered mental status and fever currently intubated for airway protection in setting of refractory agitation with initial course complicated by generalized seizures. Pt currently being managed for sepsis likely 2/2 pyelonephritis.    #NEURO  Seizures  - Pt with generalized myoclonic seizures in ED  - resolved with 6mg Ativan, with no further seizures since arrival in MICU  -     AMS    #CVS    #RESP    #GI  - Pt with history of GERD, however not taking home PPI  - OG tube in place and confirmed  - currently holding feeds due to recent intubation    #RENAL  -    #HEME  -    #ENDO  -    #ID  -    #DVT  - HSQ     #GOC  - FULL CODE 42 y/o F with hx of GERD, hypothyroidism and obesity that p/w abdominal pain, altered mental status and fever currently intubated for airway protection in setting of refractory agitation with initial course complicated by generalized seizures. Pt currently being managed for sepsis likely 2/2 pyelonephritis.    #NEURO  Seizures  - Pt with generalized myoclonic seizures in ED  - resolved with 6mg Ativan, with no further seizures since arrival in MICU  - s/p 1g keppra in ED  - rEEG to rule out epileptiform activity    AMS  - Pt with acute mental status change in setting of high fever, hypotension and tachycardia initially  - likely 2/2 sepsis in setting of infection  - currently sedated with propofol and versed  - LP performed, f/u studies, CT head negative for acute intracranial pathology    #CVS  - Tachycardic and hypotensive on admission  - now mapping 66-72 with sedation  - no pressor requirements at this time    #RESP  - intubated on mechanical ventilation at volume of 400, RR of 14, PEEP of 5 and FiO2 40%  - ABG showing appropriate oxygenation with no CO2 retention  - plan to wean off vent as tolerated    #GI  - Pt with history of GERD, however not taking home PPI  - OG tube in place and confirmed  - currently holding feeds due to recent intubation    #RENAL  - SCr at 0.57, at baseline  - however decreased UOP overnight  - c/w maintenance fluids, D5LR at 125cc/hr    #HEME  - leukocytosis to 14 on admission, likely 2/2 active infection  - H/H and platelets stable, no active bleeding  - monitor daily CBCs    #ENDO  - pt with remote history of hypothyroidism, however on no meds recently  - outpt TSH WNL on 10/2019  - repeat TSH 1.7 today    #ID  - Pt initially presenting with high fever, tachycardia and hypotension  - UA with LE positive and moderate bacteria  - CT abd showing possible left pyelonephritis  - Urine and blood cultures sent on 12/1, f/u results  - c/w vancomycin (f/u vanc trough) and cefepime for now    #DVT  - HSQ     #GOC  - FULL CODE    Riri Rosado MD  PGY-1, MICU  Pager: [phone] 40 y/o F with hx of GERD, hypothyroidism and obesity that p/w abdominal pain, altered mental status and fever currently intubated for airway protection in setting of refractory agitation with initial course complicated by generalized seizures. Pt currently being managed for sepsis likely 2/2 pyelonephritis.    #NEURO  Seizures  - Pt with generalized myoclonic seizures in ED  - resolved with 6mg Ativan, with no further seizures since arrival in MICU  - s/p 1g keppra in ED  - rEEG to rule out epileptiform activity once off sedation    AMS  - Pt with acute mental status change in setting of high fever, hypotension and tachycardia initially  - likely 2/2 sepsis in setting of infection  - currently sedated with propofol and versed, plan to wean off today  - LP performed, f/u studies, CT head negative for acute intracranial pathology    #CVS  - Tachycardic and hypotensive on admission  - now mapping 66-72 with sedation  - no pressor requirements at this time    #RESP  - intubated on mechanical ventilation at volume of 400, RR of 14, PEEP of 5 and FiO2 40%  - ABG showing appropriate oxygenation with no CO2 retention  - plan to wean off vent as tolerated    #GI  - Pt with history of GERD, however not taking home PPI  - OG tube in place and confirmed  - currently holding feeds due to recent intubation    #RENAL  - SCr at 0.57, at baseline  - however decreased UOP overnight  - c/w maintenance fluids, LR at 125cc/hr    #HEME  - leukocytosis to 14 on admission, likely 2/2 active infection  - H/H and platelets stable, no active bleeding  - monitor daily CBCs    #ENDO  - pt with remote history of hypothyroidism, however on no meds recently  - outpt TSH WNL on 10/2019  - repeat TSH 1.7 today  - A1C today    #ID  - Pt initially presenting with high fever, tachycardia and hypotension  - UA with LE positive and moderate bacteria  - CT abd showing possible left pyelonephritis  - Urine and blood cultures sent on 12/1, f/u results  - c/w vancomycin (f/u vanc trough) and cefepime for now    #DVT  - Lovenox 40mg qd    #GOC  - FULL CODE    Riri Rosado MD  PGY-1, MICU  Pager: [phone]

## 2019-12-02 NOTE — PROGRESS NOTE ADULT - ATTENDING COMMENTS
1. Acute hypoxemic respiratory failure due to sepsis and bacteremia. Pt was delirious and agitated with fever 107.0F. Pt now afebrile. Hemodynamically stable. trial of wean and extubation.  2. ID: Gram negative bacteremia, gram negative UTI. CT Abd L pyelonephritis. Continue cefepime. D/C vancomycin. 1. Acute hypoxemic respiratory failure due to sepsis and bacteremia. Pt was delirious and agitated with fever 107.0F. Pt now afebrile. Hemodynamically stable. trial of wean and extubation.  2. ID: Gram negative bacteremia, gram negative UTI. CT Abd L pyelonephritis. Continue cefepime. D/C vancomycin.  3. ? seizure. EEG. Monitor off  keppra.

## 2019-12-02 NOTE — AIRWAY REMOVAL NOTE  ADULT & PEDS - ARTIFICAL AIRWAY REMOVAL COMMENTS
Written order for extubation verified. The patient was identified by full name and birth date compared to the identification band. Present during the procedure was minor JIMÉNEZ

## 2019-12-02 NOTE — EEG REPORT - NS EEG TEXT BOX
[facility]   COMPREHENSIVE EPILEPSY CENTER   REPORT OF ROUTINE VIDEO EEG     [facility]: [address] [place] [address] [address], [place] [address], Ph#: [phone]  [facility]: [id number], [place] [address], Ph#: [phone]  [facility]: [address] [place] [address], Ph#: [phone]    Patient Name: ISABELA VARNER  Age and : 34y ([birth year])  MRN #: [id number]  Location: [facility] 514 W1  Referring Physician: Jeff Carter    Study Date: 19    _____________________________________________________________  TECHNICAL INFORMATION    Placement and Labeling of Electrodes:  The EEG was performed utilizing 20 channels referential EEG connections (coronal over temporal over parasagittal montage) using all standard 10-20 electrode placements with EKG.  Recording was at a sampling rate of 256 samples per second per channel.  Time synchronized digital video recording was done simultaneously with EEG recording.  A low light infrared camera was used for low light recording.  Tariq and seizure detection algorithms were utilized.    _____________________________________________________________  HISTORY    Patient is a 34y old  Female who presents with a chief complaint of abdominal pain, agitation, altered mental status (02 Dec 2019 05:47)      PERTINENT MEDICATION:  midazolam Infusion 0.1 mG/kG/Hr (7.8 mL/Hr) IV Continuous <Continuous>  propofol Infusion 50 MICROgram(s)/kG/Min (23.4 mL/Hr) IV Continuous <Continuous>  _____________________________________________________________  STUDY INTERPRETATION    Findings: The background was continuous, spontaneously variable and reactive. During wakefulness, the posterior dominant rhythm consisted of symmetric, well-modulated 9 Hz activity, with amplitude to 30 uV, that attenuated to eye opening.  Low amplitude frontal beta was noted in wakefulness.      Background Slowing:  Diffuse theta and polymorphic delta slowing.      Focal Slowing:   None were present.    Sleep Background:  Drowsiness was characterized by fragmentation, attenuation, and slowing of the background activity.    Sleep was characterized by the presence of vertex waves, symmetric sleep spindles and K-complexes.    Other Non-Epileptiform Findings:  Diffuse excess beta activity.    Interictal Epileptiform Activity:   None were present.    Events:  Clinical events: None recorded.  Seizures: None recorded.    Activation Procedures:   Hyperventilation was not performed.    Photic stimulation was not performed.     Artifacts:  Intermittent myogenic and movement artifacts were noted.    ECG:  The heart rate on single channel ECG was predominantly between 70-80 BPM.    _____________________________________________________________  EEG SUMMARY/CLASSIFICATION    Abnormal EEG in the awake, drowsy and asleep states.  - Mild generalized slowing.  - Diffuse excess beta activity.  _____________________________________________________________  EEG IMPRESSION/CLINICAL CORRELATE    Abnormal EEG study.  Mild nonspecific diffuse or multifocal cerebral dysfunction.   No epileptiform pattern or seizure seen.    Diffuse excess beta activity may be seen with medication use such as benzodiazepines or barbiturates.    Preliminary Fellow Report  _____________________________________________________________    Mona Tolentino MD  Epilepsy Fellow [facility]   COMPREHENSIVE EPILEPSY CENTER   REPORT OF ROUTINE VIDEO EEG     [facility]: [address] [place] [address] [address], [place] [address], Ph#: [phone]  [facility]: [id number], [place] [address], Ph#: [phone]  [facility]: [address] [place] [address], Ph#: [phone]    Patient Name: ISABELA VARNER  Age and : 34y (89)  MRN #: [id number]  Location: [facility] 514 W1  Referring Physician: Jeff Carter    Study Date: 19    _____________________________________________________________  TECHNICAL INFORMATION    Placement and Labeling of Electrodes:  The EEG was performed utilizing 20 channels referential EEG connections (coronal over temporal over parasagittal montage) using all standard 10-20 electrode placements with EKG.  Recording was at a sampling rate of 256 samples per second per channel.  Time synchronized digital video recording was done simultaneously with EEG recording.  A low light infrared camera was used for low light recording.  Tariq and seizure detection algorithms were utilized.    _____________________________________________________________  HISTORY    Patient is a 34y old  Female who presents with a chief complaint of abdominal pain, agitation, altered mental status (02 Dec 2019 05:47)      PERTINENT MEDICATION:  midazolam Infusion 0.1 mG/kG/Hr (7.8 mL/Hr) IV Continuous <Continuous>  propofol Infusion 50 MICROgram(s)/kG/Min (23.4 mL/Hr) IV Continuous <Continuous>  _____________________________________________________________  STUDY INTERPRETATION    Findings: The background was continuous, spontaneously variable and reactive. During wakefulness, the posterior dominant rhythm consisted of symmetric, well-modulated 9 Hz activity, with amplitude to 30 uV, that attenuated to eye opening.  Low amplitude frontal beta was noted in wakefulness.    Background Slowing:  Diffuse theta and polymorphic delta slowing.    Focal Slowing:   None were present.    Sleep Background:  Drowsiness was characterized by fragmentation, attenuation, and slowing of the background activity.    Sleep was characterized by the presence of vertex waves, symmetric sleep spindles and K-complexes.    Other Non-Epileptiform Findings:  Diffuse excess beta activity.    Interictal Epileptiform Activity:   None were present.    Events:  Clinical events: None recorded.  Seizures: None recorded.    Activation Procedures:   Hyperventilation was not performed.    Photic stimulation was not performed.     Artifacts:  Intermittent myogenic and movement artifacts were noted.    ECG:  The heart rate on single channel ECG was predominantly between 70-80 BPM.    _____________________________________________________________  EEG SUMMARY/CLASSIFICATION    Abnormal EEG in the awake, drowsy and asleep states.  - Mild generalized slowing.  - Diffuse excess beta activity.  _____________________________________________________________  EEG IMPRESSION/CLINICAL CORRELATE    Abnormal EEG study.  Mild nonspecific diffuse or multifocal cerebral dysfunction.   No epileptiform pattern or seizure seen.  Diffuse excess beta activity may be seen with medication use such as benzodiazepines or barbiturates.  _____________________________________________________________    Mona Tolentino MD  Epilepsy Fellow

## 2019-12-02 NOTE — CHART NOTE - NSCHARTNOTEFT_GEN_A_CORE
: Linda GALO, Tiffany GALO    INDICATION:     PROCEDURE:  [x ] LIMITED ECHO  [x ] LIMITED CHEST  [x ] LIMITED RETROPERITONEAL  [ ] LIMITED ABDOMINAL  [ ] LIMITED DVT  [ ] NEEDLE GUIDANCE VASCULAR  [ ] NEEDLE GUIDANCE THORACENTESIS  [ ] NEEDLE GUIDANCE PARACENTESIS  [ ] NEEDLE GUIDANCE PERICARDIOCENTESIS  [ ] OTHER    FINDINGS: A-lines bilaterally with no pleural effusions and no alveolar consolidations. Kidneys appear normal without cyst or hydronephrosis. Normal LV systolic function and normal LV/RV size ratio. No pericardial effusion. IVC 2.5cm.      INTERPRETATION:  No evidence for respiratory failure, no evidence for obstructive or cardiogenic shock. Kidneys without structural abnormalities. Continue care for bacterial sepsis. : Linda GALO, Tiffany GALO    INDICATION:   Respirtory failure  PROCEDURE:  [x ] LIMITED ECHO  [x ] LIMITED CHEST  [x ] LIMITED RETROPERITONEAL  [ ] LIMITED ABDOMINAL  [ ] LIMITED DVT  [ ] NEEDLE GUIDANCE VASCULAR  [ ] NEEDLE GUIDANCE THORACENTESIS  [ ] NEEDLE GUIDANCE PARACENTESIS  [ ] NEEDLE GUIDANCE PERICARDIOCENTESIS  [ ] OTHER    FINDINGS: A-lines bilaterally with no pleural effusions and no alveolar consolidations. Kidneys appear normal without cyst or hydronephrosis. Normal LV systolic function and normal LV/RV size ratio. No pericardial effusion. IVC 2.5cm.      INTERPRETATION:  No evidence for pneumonia, no evidence for obstructive or cardiogenic shock. Kidneys without structural abnormalities. Continue care for bacterial sepsis.

## 2019-12-02 NOTE — CHART NOTE - NSCHARTNOTEFT_GEN_A_CORE
MICU Transfer Note    Transfer from: MICU    Transfer to: (  ) Medicine    (  ) Telemetry     (   ) RCU        (    ) Palliative         (   ) Stroke Unit          (   ) __________________    Accepting Physician:  Signout given to:     MICU COURSE:    35 yo female with PMHx of GERD, hypothyroidism, obesity, multiple ED visits presented to [facility] ED 12/2 with complaints of generalized abdominal pain, diaphoresis, and chills x1 day. On presentation to ED found to be more agitated and confused, received Versed by ED however increasingly more agitated and violent eventually requiring sedation and intubation for airway protection. Pt vital signs notable for fever to 107F then decreasing to 102F s/p hypothermic blankets, associated with brief myoclonic seizure activity s/p total Ativan 6mg IVP and propofol gtt titration. Pt admitted to MICU for ventilator management 2/2 intubation and sedation due to severe refractory agitation and confusion, also concern for malignant hyperthermia to temperature of 107F c/b brief seizure activity. Pt received a lumbar puncture, underwent EEG negative for seizure activity and started on broad spectrum antibiotics for management of sepsis 2/2 pyelonephritis on CT scan, later found with Ecoli bacteremia. Pt weaned off sedation early 12/2 tolerating pressure support and was successfully extubated to room air. Pt returned to baseline mental status s/p extubation, passed bedside speech/swallow and tolerating PO diet. POCUS at bedside rounds with no evidence of respiratory failure, no evidence for obstructive and cardiogenic shock, kidneys without structural abnormalities. Pt no longer requiring ICU level of care and stable for transfer to the floor, continuing on cefepime for Ecoli bacteremia.         ASSESSMENT & PLAN:           FOR FOLLOW UP:  - continue Cefepime, f/u with Ecoli bacteremia sensitivities, repeat blood cultures to be drawn 12/3  - continue D5LR maintenance IVF, discontinue as pt tolerating increased PO intake and as fingersticks permit  - monitor for fever and signs/symptoms of infection, monitor pain MICU Transfer Note    Transfer from: MICU    Transfer to: (  ) Medicine    (  ) Telemetry     (   ) RCU        (    ) Palliative         (   ) Stroke Unit          (   ) __________________    Accepting Physician:  Signout given to:     MICU COURSE:    35 yo female with PMHx of GERD, hypothyroidism, obesity, multiple ED visits presented to [facility] ED 12/2 with complaints of generalized abdominal pain, diaphoresis, and chills x1 day. On presentation to ED found to be more agitated and confused, received Versed by ED however increasingly more agitated and violent eventually requiring sedation and intubation for airway protection. Pt vital signs notable for fever to 107F then decreasing to 102F s/p hypothermic blankets, associated with brief myoclonic seizure activity s/p total Ativan 6mg IVP and propofol gtt titration. Pt admitted to MICU for ventilator management 2/2 intubation and sedation due to severe refractory agitation and confusion, also concern for malignant hyperthermia to temperature of 107F c/b brief seizure activity. Pt received a lumbar puncture, underwent EEG negative for seizure activity and started on broad spectrum antibiotics for management of sepsis 2/2 pyelonephritis on CT scan, later found with Ecoli bacteremia. Pt weaned off sedation early 12/2 tolerating pressure support and was successfully extubated to room air. Pt returned to baseline mental status s/p extubation, passed bedside speech/swallow and tolerating PO diet. POCUS at bedside rounds with no evidence of respiratory failure, no evidence for obstructive and cardiogenic shock, kidneys without structural abnormalities. Pt no longer requiring ICU level of care and stable for transfer to the floor, continuing on cefepime for Ecoli bacteremia.     ASSESSMENT & PLAN:   42 y/o F with hx of GERD, hypothyroidism and obesity that p/w abdominal pain, altered mental status and fever to 107F currently intubated for airway protection in setting of refractory agitation with initial course complicated by generalized seizures. Pt currently being managed for sepsis 2/2 pyelonephritis found with Ecoli bacteremia. Successfully extubated to room air, at baseline mental status and tolerating PO, no longer requiring ICU care and stable for transfer to the floors.     #NEURO  Seizures  - Pt with generalized myoclonic seizures in ED associated with Fever to 107F  - resolved with 6mg Ativan, with no further seizures since arrival in MICU, EEG negative for seizures  - s/p 1g keppra in ED    AMS  - Pt with acute mental status change in setting of high fever, hypotension and tachycardia initially 2/2 sepsis in setting of infection  - s/p LP, CTH negative for acute pathology  - s/p sedation with propofol/versed gtt while intubated, weaned successfully AM of 12/1 and returned to baseline mental status AAOx3    #CVS  - Tachycardic and hypotensive on admission, not requiring vasopressors  - no active issues     #RESP  - s/p intubation on mechanical ventilation at volume of 400, RR of 14, PEEP of 5 and FiO2 40%  - successfully extubated to room air early 12/2, no active issues at this time.     #GI  - Pt with history of GERD, however not taking home PPI  - passed bedside speech/swallow s/p extubation  - written for regular diet    #RENAL  - SCr at 0.57, at baseline  - strict I's and O's i/s/o pyelonephritis, f/u sCr  - remains on maintenance IVF     #HEME  - leukocytosis to 14 on admission, likely 2/2 active infection  - H/H and platelets stable, no active bleeding  - monitor daily CBCs    #ENDO  - pt with remote history of hypothyroidism, however on no meds recently  - outpt TSH WNL on 10/2019  - repeat TSH 1.7 today    #ID  - Pt initially presenting with high fever, tachycardia and hypotension  - UA with LE positive and moderate bacteria  - CT abd showing possible left pyelonephritis  - Urine and blood cultures sent on 12/1, f/u results  - c/w vancomycin (f/u vanc trough) and cefepime for now    #DVT  - Lovenox 40mg qd    #GOC  - FULL CODE          FOR FOLLOW UP:  - continue Cefepime, f/u with Ecoli bacteremia sensitivities, repeat blood cultures to be drawn 12/3  - continue D5LR maintenance IVF, discontinue as pt tolerating increased PO intake and as fingersticks permit  - monitor for fever and signs/symptoms of infection, monitor pain MICU Transfer Note    Transfer from: MICU    Transfer to: (  ) Medicine    (  ) Telemetry     (   ) RCU        (    ) Palliative         (   ) Stroke Unit          (   ) __________________    Accepting Physician:  Signout given to:     MICU COURSE:    35 yo female with PMHx of GERD, hypothyroidism, obesity, multiple ED visits presented to [facility] ED 12/2 with complaints of generalized abdominal pain, diaphoresis, and chills x1 day. On presentation to ED found to be more agitated and confused, received Versed by ED however increasingly more agitated and violent eventually requiring sedation and intubation for airway protection. Pt vital signs notable for fever to 107F then decreasing to 102F s/p hypothermic blankets, associated with brief myoclonic seizure activity s/p total Ativan 6mg IVP and propofol gtt titration. Pt admitted to MICU for ventilator management 2/2 intubation and sedation due to severe refractory agitation and confusion, also concern for malignant hyperthermia to temperature of 107F c/b brief seizure activity. Pt received a lumbar puncture, underwent EEG negative for seizure activity and started on broad spectrum antibiotics for management of sepsis 2/2 pyelonephritis on CT scan, later found with Ecoli bacteremia. Pt weaned off sedation early 12/2 tolerating pressure support and was successfully extubated to room air. Pt returned to baseline mental status s/p extubation, passed bedside speech/swallow and tolerating PO diet. POCUS at bedside rounds with no evidence of respiratory failure, no evidence for obstructive and cardiogenic shock, kidneys without structural abnormalities. Pt no longer requiring ICU level of care and stable for transfer to the floor, continuing on cefepime for Ecoli bacteremia.     ASSESSMENT & PLAN:   40 y/o F with hx of GERD, hypothyroidism and obesity that p/w abdominal pain, altered mental status and fever to 107F currently intubated for airway protection in setting of refractory agitation with initial course complicated by generalized seizures. Pt currently being managed for sepsis 2/2 pyelonephritis found with Ecoli bacteremia. Successfully extubated to room air, at baseline mental status and tolerating PO, no longer requiring ICU care and stable for transfer to the floors.     #NEURO  Seizures  - Pt with generalized myoclonic seizures in ED associated with Fever to 107F  - resolved with 6mg Ativan, with no further seizures since arrival in MICU, EEG negative for seizures  - s/p 1g keppra in ED    AMS  - Pt with acute mental status change in setting of high fever, hypotension and tachycardia initially 2/2 sepsis in setting of infection  - s/p LP, CTH negative for acute pathology  - s/p sedation with propofol/versed gtt while intubated, weaned successfully AM of 12/1 and returned to baseline mental status AAOx3    #CVS  - Tachycardic and hypotensive on admission, not requiring vasopressors  - no active issues     #RESP  - s/p intubation on mechanical ventilation at volume of 400, RR of 14, PEEP of 5 and FiO2 40%  - successfully extubated to room air early 12/2, no active issues at this time.     #GI  - Pt with history of GERD, however not taking home PPI  - passed bedside speech/swallow s/p extubation  - written for regular diet    #RENAL  - SCr at 0.57, at baseline  - strict I's and O's i/s/o pyelonephritis, f/u sCr  - remains on maintenance IVF     #HEME  - leukocytosis to 14 on admission, likely 2/2 active infection  - H/H and platelets stable, no active bleeding  - monitor daily CBCs    #ENDO  - pt with remote history of hypothyroidism, however on no meds recently  - outpt TSH WNL on 10/2019  - repeat TSH 1.7 today    #ID  - Pt initially presenting with high fever, tachycardia and hypotension, procalcitonin 20  - UA with LE positive and moderate bacteria, f/u Urine cx  - CT abd showing possible left pyelonephritis  - Blood culture 12/1 with Ecoli bacteremia, 1 more blood culture results pending, will r/p set 12/3 - f/u sensitivities  - continue cefepime pending sensitivities     #DVT  - Lovenox 40mg qd    #GOC  - FULL CODE          FOR FOLLOW UP:  - continue Cefepime, f/u with Ecoli bacteremia sensitivities, repeat blood cultures to be drawn 12/3  - continue D5LR maintenance IVF, discontinue as pt tolerating increased PO intake and as fingersticks permit  - monitor for fever and signs/symptoms of infection, monitor pain MICU Transfer Note    Transfer from: MICU    Transfer to: ( X ) Medicine    (  ) Telemetry     (   ) RCU        (    ) Palliative         (   ) Stroke Unit          (   ) __________________    Accepting Physician:  Signout given to:     MICU COURSE:    33 yo female with PMHx of GERD, hypothyroidism, obesity, multiple ED visits presented to [facility] ED 12/1 with complaints of generalized abdominal pain, diaphoresis, and chills x1 day. On presentation to ED found to be more agitated and confused, received Versed by ED however increasingly more agitated and violent eventually requiring sedation and intubation for airway protection. Pt vital signs notable for fever to 107F then decreasing to 102F s/p hypothermic blankets, associated with brief myoclonic seizure activity s/p total Ativan 6mg IVP and propofol gtt titration. Pt admitted to MICU for ventilator management 2/2 intubation and sedation due to severe refractory agitation and confusion, also concern for malignant hyperthermia to temperature of 107F c/b brief seizure activity. Pt received a lumbar puncture, underwent EEG negative for seizure activity and started on broad spectrum antibiotics for management of sepsis 2/2 pyelonephritis on CT scan, later found with Ecoli bacteremia. Pt weaned off sedation early 12/2 tolerating pressure support and was successfully extubated to room air. Pt returned to baseline mental status s/p extubation, passed bedside speech/swallow and tolerating PO diet. POCUS at bedside rounds with no evidence of respiratory failure, no evidence for obstructive and cardiogenic shock, kidneys without structural abnormalities. Pt no longer requiring ICU level of care and stable for transfer to the floor, continuing on cefepime for Ecoli bacteremia.     ASSESSMENT & PLAN:   42 y/o F with hx of GERD, hypothyroidism and obesity that p/w abdominal pain, altered mental status and fever to 107F currently intubated for airway protection in setting of refractory agitation with initial course complicated by generalized seizures. Pt currently being managed for sepsis 2/2 pyelonephritis found with Ecoli bacteremia. Successfully extubated to room air, at baseline mental status and tolerating PO, no longer requiring ICU care and stable for transfer to the floors.     #NEURO  Seizures  - Pt with generalized myoclonic seizures in ED associated with Fever to 107F  - resolved with 6mg Ativan, with no further seizures since arrival in MICU, EEG negative for seizures  - s/p 1g keppra in ED    AMS  - Pt with acute mental status change in setting of high fever, hypotension and tachycardia initially 2/2 sepsis in setting of infection  - s/p LP, CTH negative for acute pathology  - s/p sedation with propofol/versed gtt while intubated, weaned successfully AM of 12/1 and returned to baseline mental status AAOx3    #CVS  - Tachycardic and hypotensive on admission, not requiring vasopressors  - no active issues     #RESP  - s/p intubation on mechanical ventilation at volume of 400, RR of 14, PEEP of 5 and FiO2 40%  - successfully extubated to room air early 12/2, no active issues at this time.     #GI  - Pt with history of GERD, however not taking home PPI  - passed bedside speech/swallow s/p extubation  - written for regular diet    #RENAL  - SCr at 0.57, at baseline  - strict I's and O's i/s/o pyelonephritis, f/u sCr  - remains on maintenance IVF     #HEME  - leukocytosis to 14 on admission, likely 2/2 active infection  - H/H and platelets stable, no active bleeding  - monitor daily CBCs    #ENDO  - pt with remote history of hypothyroidism, however on no meds recently  - outpt TSH WNL on 10/2019  - repeat TSH 1.7 today    #ID  - Pt initially presenting with high fever, tachycardia and hypotension, procalcitonin 20  - UA with LE positive and moderate bacteria, f/u Urine cx  - CT abd showing possible left pyelonephritis  - Blood culture 12/1 with Ecoli bacteremia, 1 more blood culture results pending, will r/p set 12/3 - f/u sensitivities  - continue cefepime pending sensitivities     #DVT  - Lovenox 40mg qd    #GOC  - FULL CODE          FOR FOLLOW UP:  - continue Cefepime, f/u with Ecoli bacteremia sensitivities, repeat blood cultures to be drawn 12/3  - continue D5LR maintenance IVF, discontinue as pt tolerating increased PO intake and as fingersticks permit  - monitor for fever and signs/symptoms of infection, monitor pain MICU Transfer Note    Transfer from: MICU    Transfer to: ( X ) Medicine    (  ) Telemetry     (   ) RCU        (    ) Palliative         (   ) Stroke Unit          (   ) __________________    Accepting Physician: Dr. Nikia Dsouza  Signout given to:     MICU COURSE:    33 yo female with PMHx of GERD, hypothyroidism, obesity, multiple ED visits presented to [facility] ED 12/1 with complaints of generalized abdominal pain, diaphoresis, and chills x1 day. On presentation to ED found to be more agitated and confused, received Versed by ED however increasingly more agitated and violent eventually requiring sedation and intubation for airway protection. Pt vital signs notable for fever to 107F then decreasing to 102F s/p hypothermic blankets, associated with brief myoclonic seizure activity s/p total Ativan 6mg IVP and propofol gtt titration. Pt admitted to MICU for ventilator management 2/2 intubation and sedation due to severe refractory agitation and confusion, also concern for malignant hyperthermia to temperature of 107F c/b brief seizure activity. Pt received a lumbar puncture, underwent EEG negative for seizure activity and started on broad spectrum antibiotics for management of sepsis 2/2 pyelonephritis on CT scan, later found with Ecoli bacteremia. Pt weaned off sedation early 12/2 tolerating pressure support and was successfully extubated to room air. Pt returned to baseline mental status s/p extubation, passed bedside speech/swallow and tolerating PO diet. POCUS at bedside rounds with no evidence of respiratory failure, no evidence for obstructive and cardiogenic shock, kidneys without structural abnormalities. Pt no longer requiring ICU level of care and stable for transfer to the floor, continuing on cefepime for Ecoli bacteremia.     ASSESSMENT & PLAN:   42 y/o F with hx of GERD, hypothyroidism and obesity that p/w abdominal pain, altered mental status and fever to 107F currently intubated for airway protection in setting of refractory agitation with initial course complicated by generalized seizures. Pt currently being managed for sepsis 2/2 pyelonephritis found with Ecoli bacteremia. Successfully extubated to room air, at baseline mental status and tolerating PO, no longer requiring ICU care and stable for transfer to the floors.     #NEURO  Seizures  - Pt with generalized myoclonic seizures in ED associated with Fever to 107F  - resolved with 6mg Ativan, with no further seizures since arrival in MICU, EEG negative for seizures  - s/p 1g keppra in ED    AMS  - Pt with acute mental status change in setting of high fever, hypotension and tachycardia initially 2/2 sepsis in setting of infection  - s/p LP, CTH negative for acute pathology  - s/p sedation with propofol/versed gtt while intubated, weaned successfully AM of 12/1 and returned to baseline mental status AAOx3    #CVS  - Tachycardic and hypotensive on admission, not requiring vasopressors  - no active issues     #RESP  - s/p intubation on mechanical ventilation at volume of 400, RR of 14, PEEP of 5 and FiO2 40%  - successfully extubated to room air early 12/2, no active issues at this time.     #GI  - Pt with history of GERD, however not taking home PPI  - passed bedside speech/swallow s/p extubation  - written for regular diet    #RENAL  - SCr at 0.57, at baseline  - strict I's and O's i/s/o pyelonephritis, f/u sCr  - remains on maintenance IVF     #HEME  - leukocytosis to 14 on admission, likely 2/2 active infection  - H/H and platelets stable, no active bleeding  - monitor daily CBCs    #ENDO  - pt with remote history of hypothyroidism, however on no meds recently  - outpt TSH WNL on 10/2019  - repeat TSH 1.7 today    #ID  - Pt initially presenting with high fever, tachycardia and hypotension, procalcitonin 20  - UA with LE positive and moderate bacteria, f/u Urine cx  - CT abd showing possible left pyelonephritis  - Blood culture 12/1 with Ecoli bacteremia, 1 more blood culture results pending, will r/p set 12/3 - f/u sensitivities  - continue cefepime pending sensitivities     #DVT  - Lovenox 40mg qd    #GOC  - FULL CODE          FOR FOLLOW UP:  - continue Cefepime, f/u with Ecoli bacteremia sensitivities, repeat blood cultures to be drawn 12/3  - continue D5LR maintenance IVF, discontinue as pt tolerating increased PO intake and as fingersticks permit  - monitor for fever and signs/symptoms of infection, monitor pain

## 2019-12-02 NOTE — PROGRESS NOTE ADULT - SUBJECTIVE AND OBJECTIVE BOX
INTERVAL HPI/OVERNIGHT EVENTS: No acute events overnight. Pt continued on vanc/cefepime. Started on maintenance fluids, D5LR at 125cc/hr.     SUBJECTIVE: Patient seen and examined at bedside this morning. Continues to be intubated and sedated. Synchronous with vent. Unresponsive to voice and touch.     OBJECTIVE:    VITAL SIGNS:  ICU Vital Signs Last 24 Hrs  T(C): 36.5 (02 Dec 2019 04:00), Max: 41.7 (01 Dec 2019 18:26)  T(F): 97.7 (02 Dec 2019 04:00), Max: 107 (01 Dec 2019 18:26)  HR: 85 (02 Dec 2019 05:00) (85 - 172)  BP: 103/59 (02 Dec 2019 05:00) (87/50 - 144/85)  BP(mean): 75 (02 Dec 2019 05:00) (65 - 75)  ABP: --  ABP(mean): --  RR: 17 (02 Dec 2019 05:00) (16 - 29)  SpO2: 100% (02 Dec 2019 05:00) (98% - 100%)    Mode: AC/ CMV (Assist Control/ Continuous Mandatory Ventilation), RR (machine): 14, TV (machine): 400, FiO2: 40, PEEP: 5, ITime: 1, MAP: 8, PIP: 20     @ 07:01  -  - @ 05:48  --------------------------------------------------------  IN: 1306.3 mL / OUT: 565 mL / NET: 741.3 mL    CAPILLARY BLOOD GLUCOSE    POCT Blood Glucose.: 139 mg/dL (01 Dec 2019 18:34)    PHYSICAL EXAM:  General: intubated and sedated, well-developed, NAD  HEENT: NC/AT; PERRL, clear conjunctiva, no scleral icterus, ET tube in place, OG tube in place  Neck: supple, no JVD noted  Respiratory: vent sounds appreciated, good air movement b/l, no crackles, wheezes, rales  Cardiovascular: +S1/S2; RRR, no m/r/g  Abdomen: soft, NT, ND, bowel sounds positive  Extremities: 2+ peripheral pulses b/l; no LE edema  Skin: normal color and turgor; no rash  Neurological: currently sedated, unresponsive. Unable to assess mental status.    MEDICATIONS:  MEDICATIONS  (STANDING):  cefepime   IVPB 2000 milliGRAM(s) IV Intermittent every 8 hours  chlorhexidine 0.12% Liquid 15 milliLiter(s) Oral Mucosa every 12 hours  chlorhexidine 4% Liquid 1 Application(s) Topical <User Schedule>  dextrose 5% + lactated ringers. 1000 milliLiter(s) (125 mL/Hr) IV Continuous <Continuous>  midazolam Infusion 0.025 mG/kG/Hr (2 mL/Hr) IV Continuous <Continuous>  prenatal multivitamin 1 Tablet(s) Oral daily  propofol Infusion 20 MICROgram(s)/kG/Min (9.6 mL/Hr) IV Continuous <Continuous>  vancomycin  IVPB 1250 milliGRAM(s) IV Intermittent every 12 hours    MEDICATIONS  (PRN):    ALLERGIES:  Allergies    No Known Allergies    Intolerances    LABS:                        14.5   14.72 )-----------( 170      ( 01 Dec 2019 19:05 )             39.4     Hemoglobin: 14.5 g/dL ( @ 19:05)    CBC Full  -  ( 01 Dec 2019 19:05 )  WBC Count : 14.72 K/uL  RBC Count : 4.24 M/uL  Hemoglobin : 14.5 g/dL  Hematocrit : 39.4 %  Platelet Count - Automated : 170 K/uL  Mean Cell Volume : 92.9 fl  Mean Cell Hemoglobin : 34.2 pg  Mean Cell Hemoglobin Concentration : 36.8 gm/dL  Auto Neutrophil # : 9.30 K/uL  Auto Lymphocyte # : 4.79 K/uL  Auto Monocyte # : 0.50 K/uL  Auto Eosinophil # : 0.04 K/uL  Auto Basophil # : 0.04 K/uL  Auto Neutrophil % : 63.2 %  Auto Lymphocyte % : 32.5 %  Auto Monocyte % : 3.4 %  Auto Eosinophil % : 0.3 %  Auto Basophil % : 0.3 %        132<L>  |  103  |  9   ----------------------------<  233<H>  4.1   |  15<L>  |  0.57    Ca    7.8<L>      01 Dec 2019 22:57  Phos  1.9     12-  Mg     1.9     1201    TPro  6.6  /  Alb  3.9  /  TBili  1.1  /  DBili  x   /  AST  43<H>  /  ALT  39  /  AlkPhos  83      Creatinine Trend: 0.57<--, 0.72<--  LIVER FUNCTIONS - ( 01 Dec 2019 22:57 )  Alb: 3.9 g/dL / Pro: 6.6 g/dL / ALK PHOS: 83 U/L / ALT: 39 U/L / AST: 43 U/L / GGT: x           PT/INR - ( 01 Dec 2019 19:20 )   PT: 12.4 sec;   INR: 1.08 ratio      PTT - ( 01 Dec 2019 19:20 )  PTT:27.6 sec    hs Troponin:    ABG - ( 01 Dec 2019 22:51 )  pH, Arterial: 7.41  pH, Blood: x     /  pCO2: 34    /  pO2: 186   / HCO3: 21    / Base Excess: -2.7  /  SaO2: 100       22:51 - ABG - pH: 7.41  |  pCO2: 34    |  pO2: 186   | Lactate:       | BE: -2.7   19:01 - VBG - pH: 7.44  | pCO2: 31    | pO2: 47    | Lactate: 3.9      Urinalysis Basic - ( 01 Dec 2019 19:24 )    Color: Yellow / Appearance: Slightly Turbid / S.016 / pH: x  Gluc: x / Ketone: Small  / Bili: Negative / Urobili: Negative   Blood: x / Protein: 30 mg/dL / Nitrite: Negative   Leuk Esterase: Large / RBC: 13 /hpf /  /HPF   Sq Epi: x / Non Sq Epi: 8 /hpf / Bacteria: Moderate    CSF:    Total Nucleated Cell Count, CSF: 1 /uL (19 @ 21:45)  RBC Count - Spinal Fluid: 2 /uL (19 @ 21:45)    Glucose, CSF: 86 mg/dL (19 @ 21:45)  Protein, CSF: 17 mg/dL (19 @ 21:45)    EKG:   MICROBIOLOGY:    Culture - CSF with Gram Stain (collected 02 Dec 2019 01:10)  Source: .CSF CSF  Gram Stain (02 Dec 2019 02:10):    No polymorphonuclear cells seen    No organisms seen    by cytocentrifuge    IMAGING:  < from: CT Head No Cont (19 @ 19:55) >  IMPRESSION:   No CT evidence of acute intracranial hemorrhage, brain edema, mass effect   or acute territorial infarct.     < from: CT Abdomen and Pelvis w/ IV Cont (19 @ 20:15) >  IMPRESSION:     Findings suggestive of left pyelonephritis. Differential includes left   renal infarcts, which is considered less likely. Correlation with   urinalysis is recommended.    Bibasilar pulmonary opacities may reflect atelectasis and/or   consolidation. Clinical correlation for pneumonia is recommended.    < end of copied text >    Labs, imaging, EKG personally reviewed    RADIOLOGY & ADDITIONAL TESTS: Reviewed.

## 2019-12-03 DIAGNOSIS — E03.9 HYPOTHYROIDISM, UNSPECIFIED: ICD-10-CM

## 2019-12-03 DIAGNOSIS — K21.9 GASTRO-ESOPHAGEAL REFLUX DISEASE WITHOUT ESOPHAGITIS: ICD-10-CM

## 2019-12-03 DIAGNOSIS — Z02.9 ENCOUNTER FOR ADMINISTRATIVE EXAMINATIONS, UNSPECIFIED: ICD-10-CM

## 2019-12-03 DIAGNOSIS — R56.9 UNSPECIFIED CONVULSIONS: ICD-10-CM

## 2019-12-03 DIAGNOSIS — Z29.9 ENCOUNTER FOR PROPHYLACTIC MEASURES, UNSPECIFIED: ICD-10-CM

## 2019-12-03 DIAGNOSIS — R78.81 BACTEREMIA: ICD-10-CM

## 2019-12-03 DIAGNOSIS — N12 TUBULO-INTERSTITIAL NEPHRITIS, NOT SPECIFIED AS ACUTE OR CHRONIC: ICD-10-CM

## 2019-12-03 LAB
ALBUMIN SERPL ELPH-MCNC: 3.6 G/DL — SIGNIFICANT CHANGE UP (ref 3.3–5)
ALP SERPL-CCNC: 68 U/L — SIGNIFICANT CHANGE UP (ref 40–120)
ALT FLD-CCNC: 39 U/L — SIGNIFICANT CHANGE UP (ref 10–45)
ANION GAP SERPL CALC-SCNC: 11 MMOL/L — SIGNIFICANT CHANGE UP (ref 5–17)
AST SERPL-CCNC: 38 U/L — SIGNIFICANT CHANGE UP (ref 10–40)
BILIRUB SERPL-MCNC: 0.6 MG/DL — SIGNIFICANT CHANGE UP (ref 0.2–1.2)
BUN SERPL-MCNC: 5 MG/DL — LOW (ref 7–23)
CALCIUM SERPL-MCNC: 8.4 MG/DL — SIGNIFICANT CHANGE UP (ref 8.4–10.5)
CHLORIDE SERPL-SCNC: 101 MMOL/L — SIGNIFICANT CHANGE UP (ref 96–108)
CO2 SERPL-SCNC: 22 MMOL/L — SIGNIFICANT CHANGE UP (ref 22–31)
CREAT SERPL-MCNC: 0.69 MG/DL — SIGNIFICANT CHANGE UP (ref 0.5–1.3)
GLUCOSE SERPL-MCNC: 109 MG/DL — HIGH (ref 70–99)
HBA1C BLD-MCNC: 5 % — SIGNIFICANT CHANGE UP (ref 4–5.6)
HCT VFR BLD CALC: 36 % — SIGNIFICANT CHANGE UP (ref 34.5–45)
HGB BLD-MCNC: 12.7 G/DL — SIGNIFICANT CHANGE UP (ref 11.5–15.5)
MAGNESIUM SERPL-MCNC: 2 MG/DL — SIGNIFICANT CHANGE UP (ref 1.6–2.6)
MCHC RBC-ENTMCNC: 33.5 PG — SIGNIFICANT CHANGE UP (ref 27–34)
MCHC RBC-ENTMCNC: 35.3 GM/DL — SIGNIFICANT CHANGE UP (ref 32–36)
MCV RBC AUTO: 95 FL — SIGNIFICANT CHANGE UP (ref 80–100)
NRBC # BLD: 0 /100 WBCS — SIGNIFICANT CHANGE UP (ref 0–0)
PHOSPHATE SERPL-MCNC: 2.9 MG/DL — SIGNIFICANT CHANGE UP (ref 2.5–4.5)
PLATELET # BLD AUTO: 133 K/UL — LOW (ref 150–400)
POTASSIUM SERPL-MCNC: 3.3 MMOL/L — LOW (ref 3.5–5.3)
POTASSIUM SERPL-SCNC: 3.3 MMOL/L — LOW (ref 3.5–5.3)
PROT SERPL-MCNC: 6.4 G/DL — SIGNIFICANT CHANGE UP (ref 6–8.3)
RBC # BLD: 3.79 M/UL — LOW (ref 3.8–5.2)
RBC # FLD: 12 % — SIGNIFICANT CHANGE UP (ref 10.3–14.5)
SODIUM SERPL-SCNC: 134 MMOL/L — LOW (ref 135–145)
WBC # BLD: 7.99 K/UL — SIGNIFICANT CHANGE UP (ref 3.8–10.5)
WBC # FLD AUTO: 7.99 K/UL — SIGNIFICANT CHANGE UP (ref 3.8–10.5)

## 2019-12-03 PROCEDURE — 93010 ELECTROCARDIOGRAM REPORT: CPT

## 2019-12-03 PROCEDURE — 99233 SBSQ HOSP IP/OBS HIGH 50: CPT | Mod: GC

## 2019-12-03 RX ORDER — POTASSIUM CHLORIDE 20 MEQ
40 PACKET (EA) ORAL ONCE
Refills: 0 | Status: COMPLETED | OUTPATIENT
Start: 2019-12-03 | End: 2019-12-03

## 2019-12-03 RX ORDER — ACETAMINOPHEN 500 MG
650 TABLET ORAL EVERY 6 HOURS
Refills: 0 | Status: DISCONTINUED | OUTPATIENT
Start: 2019-12-03 | End: 2019-12-04

## 2019-12-03 RX ADMIN — CEFEPIME 100 MILLIGRAM(S): 1 INJECTION, POWDER, FOR SOLUTION INTRAMUSCULAR; INTRAVENOUS at 15:28

## 2019-12-03 RX ADMIN — CHLORHEXIDINE GLUCONATE 1 APPLICATION(S): 213 SOLUTION TOPICAL at 05:51

## 2019-12-03 RX ADMIN — Medication 40 MILLIEQUIVALENT(S): at 12:36

## 2019-12-03 RX ADMIN — PANTOPRAZOLE SODIUM 40 MILLIGRAM(S): 20 TABLET, DELAYED RELEASE ORAL at 05:53

## 2019-12-03 RX ADMIN — CEFEPIME 100 MILLIGRAM(S): 1 INJECTION, POWDER, FOR SOLUTION INTRAMUSCULAR; INTRAVENOUS at 05:50

## 2019-12-03 RX ADMIN — CEFEPIME 100 MILLIGRAM(S): 1 INJECTION, POWDER, FOR SOLUTION INTRAMUSCULAR; INTRAVENOUS at 22:11

## 2019-12-03 RX ADMIN — Medication 650 MILLIGRAM(S): at 02:45

## 2019-12-03 RX ADMIN — Medication 1 TABLET(S): at 15:29

## 2019-12-03 RX ADMIN — Medication 650 MILLIGRAM(S): at 02:15

## 2019-12-03 RX ADMIN — ENOXAPARIN SODIUM 40 MILLIGRAM(S): 100 INJECTION SUBCUTANEOUS at 12:27

## 2019-12-03 NOTE — PROGRESS NOTE ADULT - PROBLEM SELECTOR PLAN 3
brief myoclonic seizure 2/2 fever  -EEG neg  -CSF PCR neg, gram stain neg, segmented neutrophils 32, lymphocytes 32

## 2019-12-03 NOTE — PROGRESS NOTE ADULT - PROBLEM SELECTOR PLAN 2
2/2 pyelonephritis  - 12/1 Blood cx: E.coli 1 out of 2 bottles  - f/u repeat blood cultures and sensitivities  - cw with cefepime 2g q8hr

## 2019-12-03 NOTE — PROGRESS NOTE ADULT - PROBLEM SELECTOR PLAN 1
presented with temp 107F, diaphoresis, abdominal pain, chills  s/p extubation 12/2 for increased refractory agitation at admission  - CT Ab/P: left pyleonephritis with bibasilar pulm opacities (atelectasias vs PNA)  - cw with cefepime 2g q8hr presented with temp 107F, diaphoresis, abdominal pain, chills  s/p extubation 12/2 for increased refractory agitation at admission  - UA sig for esterase, bacteria, WBC   - CT Ab/P: left pyleonephritis with bibasilar pulm opacities (atelectasias vs PNA)  - cw with cefepime 2g q8hr

## 2019-12-03 NOTE — PROGRESS NOTE ADULT - ASSESSMENT
35 yo F Gabonese speaking with PMH hypothyroidism and GERD admitted for sepsis 2/2 pyelonephritis s/p extubation (MICU) for sedation with course complicated with E. coli bacteremia on cefepime

## 2019-12-03 NOTE — PROGRESS NOTE ADULT - ATTENDING COMMENTS
Pt seen and examined. 34F initially presented with fever/chills found to have severe sepsis 2/2 pyelonephritis, requiring intubation for airway protection, s/p extubation, further complicated by E.Coli bacteremia, s/p IV cefepime. Pt continues to spike fever, improvement in L flank pain. Pending BCx sensitivities and repeat for clearance. If BCx clears and sensitive to po abx, will transition and dc planning. Updated  at the bedside.     Carly Boyd MD  Division of Hospital Medicine  Cell: [phone]  Pager: [phone]  Office: [phone]

## 2019-12-03 NOTE — PROGRESS NOTE ADULT - SUBJECTIVE AND OBJECTIVE BOX
Venecia Quintanilla MD  PGY-1  Pager [phone]  Pager [phone] [phone]      Patient is a 34y old  Female who presents with a chief complaint of abdominal pain, agitation, altered mental status (02 Dec 2019 05:47)        SUBJECTIVE / OVERNIGHT EVENTS: 33 y/o Yakut-speaking female with medical history significant for GERD, hypothyroidism and obesity brought in from home for one day history of abdominal pain and chills. Patient was increasingly agitated and temp 107, requiring intubation and sedation. She was admitted for pyelonephritis, course complicated with E.coli bacteremia.    Overnight, patient had temp 103F and blood cultures were sent. She was afebrile this morning. Patient reports she had increased frequency days prior to admission but denies hematuria and dysuria. She denies chest pain, SOB, palpitations, N/V, constipation, diarrhea, and muscle weakness.     MEDICATIONS  (STANDING):  cefepime   IVPB 2000 milliGRAM(s) IV Intermittent every 8 hours  chlorhexidine 4% Liquid 1 Application(s) Topical <User Schedule>  dextrose 5% + lactated ringers. 1000 milliLiter(s) (125 mL/Hr) IV Continuous <Continuous>  dextrose 5%. 1000 milliLiter(s) (50 mL/Hr) IV Continuous <Continuous>  dextrose 50% Injectable 12.5 Gram(s) IV Push once  dextrose 50% Injectable 25 Gram(s) IV Push once  dextrose 50% Injectable 25 Gram(s) IV Push once  enoxaparin Injectable 40 milliGRAM(s) SubCutaneous daily  insulin lispro (HumaLOG) corrective regimen sliding scale   SubCutaneous three times a day before meals  pantoprazole    Tablet 40 milliGRAM(s) Oral before breakfast  prenatal multivitamin 1 Tablet(s) Oral daily    MEDICATIONS  (PRN):  acetaminophen    Suspension .. 650 milliGRAM(s) Oral every 6 hours PRN Temp greater or equal to 38C (100.4F)  dextrose 40% Gel 15 Gram(s) Oral once PRN Blood Glucose LESS THAN 70 milliGRAM(s)/deciliter  glucagon  Injectable 1 milliGRAM(s) IntraMuscular once PRN Glucose LESS THAN 70 milligrams/deciliter      Vital Signs Last 24 Hrs  T(C): 37.1 (03 Dec 2019 11:31), Max: 39.4 (03 Dec 2019 01:47)  T(F): 98.7 (03 Dec 2019 11:31), Max: 103 (03 Dec 2019 01:47)  HR: 99 (03 Dec 2019 11:31) (96 - 150)  BP: 101/70 (03 Dec 2019 11:31) (84/63 - 128/70)  BP(mean): 76 (02 Dec 2019 20:00) (70 - 92)  RR: 22 (03 Dec 2019 05:11) (20 - 34)  SpO2: 96% (03 Dec 2019 05:11) (96% - 100%)  CAPILLARY BLOOD GLUCOSE      POCT Blood Glucose.: 97 mg/dL (02 Dec 2019 22:02)  POCT Blood Glucose.: 106 mg/dL (02 Dec 2019 18:04)    I&O's Summary    02 Dec 2019 07:01  -  03 Dec 2019 07:00  --------------------------------------------------------  IN: 2465.4 mL / OUT: 1921 mL / NET: 544.4 mL        PHYSICAL EXAM  GENERAL: NAD, lying comfortably in bed   HEENT:  Atraumatic, Normocephalic, EOMI, conjunctiva and sclera clear, no LAD  CHEST/LUNG: Clear to auscultation bilaterally; No wheeze  HEART: RRR, S1 and S2 No murmurs, rubs, or gallops  ABDOMEN: Soft, Nontender, Nondistended; Bowel sounds present. No suprapubic tenderness. Neg CVA tenderness. Tenderness at left axilla at lumbar level  EXTREMITIES:  2+ Peripheral Pulses, No clubbing, cyanosis, or edema  NEURO: AAOx3, 5/5 motor strength all extremities. 5/5 sensation all extremities. CN II-XII intact  SKIN: No rashes or lesions    LABS:                        12.7   7.99  )-----------( 133      ( 03 Dec 2019 06:44 )             36.0     12-03    134<L>  |  101  |  5<L>  ----------------------------<  109<H>  3.3<L>   |  22  |  0.69    Ca    8.4      03 Dec 2019 06:44  Phos  2.9     12-  Mg     2.0     12-    TPro  6.4  /  Alb  3.6  /  TBili  0.6  /  DBili  x   /  AST  38  /  ALT  39  /  AlkPhos  68  12-    PT/INR - ( 01 Dec 2019 19:20 )   PT: 12.4 sec;   INR: 1.08 ratio         PTT - ( 01 Dec 2019 19:20 )  PTT:27.6 sec  CARDIAC MARKERS ( 01 Dec 2019 22:57 )  x     / x     / 755 U/L / x     / x      CARDIAC MARKERS ( 01 Dec 2019 18:56 )  x     / x     / 146 U/L / x     / x          Urinalysis Basic - ( 01 Dec 2019 19:24 )    Color: Yellow / Appearance: Slightly Turbid / S.016 / pH: x  Gluc: x / Ketone: Small  / Bili: Negative / Urobili: Negative   Blood: x / Protein: 30 mg/dL / Nitrite: Negative   Leuk Esterase: Large / RBC: 13 /hpf /  /HPF   Sq Epi: x / Non Sq Epi: 8 /hpf / Bacteria: Moderate      ABG - ( 01 Dec 2019 22:51 )  pH, Arterial: 7.41  pH, Blood: x     /  pCO2: 34    /  pO2: 186   / HCO3: 21    / Base Excess: -2.7  /  SaO2: 100                 RADIOLOGY & ADDITIONAL TESTS:    Imaging Personally Reviewed:  Consultant(s) Notes Reviewed: Venecia Quintanilla MD  PGY-1  Pager [phone]  Pager [facility] [phone]      Patient is a 34y old  Female who presents with a chief complaint of abdominal pain, agitation, altered mental status (02 Dec 2019 05:47)        SUBJECTIVE / OVERNIGHT EVENTS: 35 y/o Upper sorbian-speaking female with medical history significant for GERD, hypothyroidism and obesity brought in from home for one day history of abdominal pain and chills. Patient was increasingly agitated and temp 107, requiring intubation and sedation. She was admitted for pyelonephritis, course complicated with E.coli bacteremia.    Overnight, patient had temp 103F and blood cultures were sent. She was afebrile this morning. Patient reports she had increased frequency days prior to admission but denies hematuria and dysuria. She denies chest pain, SOB, palpitations, N/V, constipation, diarrhea, and muscle weakness.     MEDICATIONS  (STANDING):  cefepime   IVPB 2000 milliGRAM(s) IV Intermittent every 8 hours  chlorhexidine 4% Liquid 1 Application(s) Topical <User Schedule>  dextrose 5% + lactated ringers. 1000 milliLiter(s) (125 mL/Hr) IV Continuous <Continuous>  dextrose 5%. 1000 milliLiter(s) (50 mL/Hr) IV Continuous <Continuous>  dextrose 50% Injectable 12.5 Gram(s) IV Push once  dextrose 50% Injectable 25 Gram(s) IV Push once  dextrose 50% Injectable 25 Gram(s) IV Push once  enoxaparin Injectable 40 milliGRAM(s) SubCutaneous daily  insulin lispro (HumaLOG) corrective regimen sliding scale   SubCutaneous three times a day before meals  pantoprazole    Tablet 40 milliGRAM(s) Oral before breakfast  prenatal multivitamin 1 Tablet(s) Oral daily    MEDICATIONS  (PRN):  acetaminophen    Suspension .. 650 milliGRAM(s) Oral every 6 hours PRN Temp greater or equal to 38C (100.4F)  dextrose 40% Gel 15 Gram(s) Oral once PRN Blood Glucose LESS THAN 70 milliGRAM(s)/deciliter  glucagon  Injectable 1 milliGRAM(s) IntraMuscular once PRN Glucose LESS THAN 70 milligrams/deciliter      Vital Signs Last 24 Hrs  T(C): 37.1 (03 Dec 2019 11:31), Max: 39.4 (03 Dec 2019 01:47)  T(F): 98.7 (03 Dec 2019 11:31), Max: 103 (03 Dec 2019 01:47)  HR: 99 (03 Dec 2019 11:31) (96 - 150)  BP: 101/70 (03 Dec 2019 11:31) (84/63 - 128/70)  BP(mean): 76 (02 Dec 2019 20:00) (70 - 92)  RR: 22 (03 Dec 2019 05:11) (20 - 34)  SpO2: 96% (03 Dec 2019 05:11) (96% - 100%)  CAPILLARY BLOOD GLUCOSE      POCT Blood Glucose.: 97 mg/dL (02 Dec 2019 22:02)  POCT Blood Glucose.: 106 mg/dL (02 Dec 2019 18:04)    I&O's Summary    02 Dec 2019 07:01  -  03 Dec 2019 07:00  --------------------------------------------------------  IN: 2465.4 mL / OUT: 1921 mL / NET: 544.4 mL        PHYSICAL EXAM  GENERAL: NAD, lying comfortably in bed   HEENT:  Atraumatic, Normocephalic, EOMI, conjunctiva and sclera clear, no LAD  CHEST/LUNG: Clear to auscultation bilaterally; No wheeze  HEART: RRR, S1 and S2 No murmurs, rubs, or gallops  ABDOMEN: Soft, Nontender, Nondistended; Bowel sounds present. No suprapubic tenderness. Neg CVA tenderness. Tenderness at left axilla at lumbar level  EXTREMITIES:  2+ Peripheral Pulses, No clubbing, cyanosis, or edema  NEURO: AAOx3, 5/5 motor strength all extremities. 5/5 sensation all extremities. CN II-XII intact  SKIN: No rashes or lesions    LABS:                        12.7   7.99  )-----------( 133      ( 03 Dec 2019 06:44 )             36.0     12-03    134<L>  |  101  |  5<L>  ----------------------------<  109<H>  3.3<L>   |  22  |  0.69    Ca    8.4      03 Dec 2019 06:44  Phos  2.9     12-  Mg     2.0     12-    TPro  6.4  /  Alb  3.6  /  TBili  0.6  /  DBili  x   /  AST  38  /  ALT  39  /  AlkPhos  68  12-    PT/INR - ( 01 Dec 2019 19:20 )   PT: 12.4 sec;   INR: 1.08 ratio         PTT - ( 01 Dec 2019 19:20 )  PTT:27.6 sec  CARDIAC MARKERS ( 01 Dec 2019 22:57 )  x     / x     / 755 U/L / x     / x      CARDIAC MARKERS ( 01 Dec 2019 18:56 )  x     / x     / 146 U/L / x     / x          Urinalysis Basic - ( 01 Dec 2019 19:24 )    Color: Yellow / Appearance: Slightly Turbid / S.016 / pH: x  Gluc: x / Ketone: Small  / Bili: Negative / Urobili: Negative   Blood: x / Protein: 30 mg/dL / Nitrite: Negative   Leuk Esterase: Large / RBC: 13 /hpf /  /HPF   Sq Epi: x / Non Sq Epi: 8 /hpf / Bacteria: Moderate      ABG - ( 01 Dec 2019 22:51 )  pH, Arterial: 7.41  pH, Blood: x     /  pCO2: 34    /  pO2: 186   / HCO3: 21    / Base Excess: -2.7  /  SaO2: 100                 RADIOLOGY & ADDITIONAL TESTS:    Imaging Personally Reviewed:  Consultant(s) Notes Reviewed:

## 2019-12-03 NOTE — CHART NOTE - NSCHARTNOTEFT_GEN_A_CORE
MAR Accept Note    Pt seen and examined at bedside, no physical exam findings precluding transfer to general medical floor.    33 yo female with PMHx of GERD, hypothyroidism, obesity, multiple ED visits presented to [facility] ED 12/1 with complaints of generalized abdominal pain, diaphoresis, and chills x1 day. On presentation to ED found to be more agitated and confused, received Versed by ED however increasingly more agitated and violent eventually requiring sedation and intubation for airway protection. Pt vital signs notable for fever to 107F then decreasing to 102F s/p hypothermic blankets, associated with brief myoclonic seizure activity s/p total Ativan 6mg IVP and propofol gtt titration. Pt admitted to MICU for ventilator management 2/2 intubation and sedation due to severe refractory agitation and confusion, also concern for malignant hyperthermia to temperature of 107F c/b brief seizure activity. Pt received a lumbar puncture, underwent EEG negative for seizure activity and started on broad spectrum antibiotics for management of sepsis 2/2 pyelonephritis on CT scan, later found with Ecoli bacteremia. Pt weaned off sedation early 12/2 tolerating pressure support and was successfully extubated to room air. Pt returned to baseline mental status s/p extubation, passed bedside speech/swallow and tolerating PO diet. POCUS at bedside rounds with no evidence of respiratory failure, no evidence for obstructive and cardiogenic shock, kidneys without structural abnormalities. Pt no longer requiring ICU level of care and stable for transfer to the floor, continuing on cefepime for Ecoli bacteremia.     ASSESSMENT & PLAN:   42 y/o F with hx of GERD, hypothyroidism and obesity that p/w abdominal pain, altered mental status and fever to 107F currently intubated for airway protection in setting of refractory agitation with initial course complicated by generalized seizures. Pt currently being managed for sepsis 2/2 pyelonephritis found with Ecoli bacteremia. Successfully extubated to room air, at baseline mental status and tolerating PO, no longer requiring ICU care and stable for transfer to the floors.     #NEURO  Seizures  - Pt with generalized myoclonic seizures in ED associated with Fever to 107F  - resolved with 6mg Ativan, with no further seizures since arrival in MICU, EEG negative for seizures  - s/p 1g keppra in ED    AMS  - Pt with acute mental status change in setting of high fever, hypotension and tachycardia initially 2/2 sepsis in setting of infection  - s/p LP, CTH negative for acute pathology  - s/p sedation with propofol/versed gtt while intubated, weaned successfully AM of 12/1 and returned to baseline mental status AAOx3    #CVS  - Tachycardic and hypotensive on admission, not requiring vasopressors  - no active issues     #RESP  - s/p intubation on mechanical ventilation at volume of 400, RR of 14, PEEP of 5 and FiO2 40%  - successfully extubated to room air early 12/2, no active issues at this time.     #GI  - Pt with history of GERD, however not taking home PPI  - passed bedside speech/swallow s/p extubation  - written for regular diet    #RENAL  - SCr at 0.57, at baseline  - strict I's and O's i/s/o pyelonephritis, f/u sCr  - remains on maintenance IVF     #HEME  - leukocytosis to 14 on admission, likely 2/2 active infection  - H/H and platelets stable, no active bleeding  - monitor daily CBCs    #ENDO  - pt with remote history of hypothyroidism, however on no meds recently  - outpt TSH WNL on 10/2019  - repeat TSH 1.7 today    #ID  - Pt initially presenting with high fever, tachycardia and hypotension, procalcitonin 20  - UA with LE positive and moderate bacteria, f/u Urine cx  - CT abd showing possible left pyelonephritis  - Blood culture 12/1 with Ecoli bacteremia, 1 more blood culture results pending, will r/p set 12/3 - f/u sensitivities  - continue cefepime pending sensitivities     #DVT  - Lovenox 40mg qd    #GOC  - FULL CODE          FOR FOLLOW UP:  [ ] continue Cefepime, f/u with Ecoli bacteremia sensitivities, repeat blood cultures to be drawn 12/3  [ ]  continue D5LR maintenance IVF, discontinue as pt tolerating increased PO intake and as fingersticks permit  [ ] monitor for fever and signs/symptoms of infection, monitor pain.    Sara Garber MD  PGY3 IM

## 2019-12-04 ENCOUNTER — TRANSCRIPTION ENCOUNTER (OUTPATIENT)
Age: 34
End: 2019-12-04

## 2019-12-04 VITALS
HEART RATE: 79 BPM | RESPIRATION RATE: 18 BRPM | DIASTOLIC BLOOD PRESSURE: 66 MMHG | OXYGEN SATURATION: 98 % | SYSTOLIC BLOOD PRESSURE: 101 MMHG | TEMPERATURE: 98 F

## 2019-12-04 DIAGNOSIS — R45.1 RESTLESSNESS AND AGITATION: ICD-10-CM

## 2019-12-04 DIAGNOSIS — R50.9 FEVER, UNSPECIFIED: ICD-10-CM

## 2019-12-04 DIAGNOSIS — R00.0 TACHYCARDIA, UNSPECIFIED: ICD-10-CM

## 2019-12-04 PROBLEM — E03.9 HYPOTHYROIDISM, UNSPECIFIED: Chronic | Status: ACTIVE | Noted: 2019-12-01

## 2019-12-04 LAB
-  AMIKACIN: SIGNIFICANT CHANGE UP
-  AMPICILLIN/SULBACTAM: SIGNIFICANT CHANGE UP
-  AMPICILLIN: SIGNIFICANT CHANGE UP
-  AZTREONAM: SIGNIFICANT CHANGE UP
-  CEFAZOLIN: SIGNIFICANT CHANGE UP
-  CEFEPIME: SIGNIFICANT CHANGE UP
-  CEFOXITIN: SIGNIFICANT CHANGE UP
-  CEFTRIAXONE: SIGNIFICANT CHANGE UP
-  CIPROFLOXACIN: SIGNIFICANT CHANGE UP
-  ERTAPENEM: SIGNIFICANT CHANGE UP
-  GENTAMICIN: SIGNIFICANT CHANGE UP
-  IMIPENEM: SIGNIFICANT CHANGE UP
-  LEVOFLOXACIN: SIGNIFICANT CHANGE UP
-  MEROPENEM: SIGNIFICANT CHANGE UP
-  PIPERACILLIN/TAZOBACTAM: SIGNIFICANT CHANGE UP
-  TOBRAMYCIN: SIGNIFICANT CHANGE UP
-  TRIMETHOPRIM/SULFAMETHOXAZOLE: SIGNIFICANT CHANGE UP
ANION GAP SERPL CALC-SCNC: 11 MMOL/L — SIGNIFICANT CHANGE UP (ref 5–17)
BASOPHILS # BLD AUTO: 0.04 K/UL — SIGNIFICANT CHANGE UP (ref 0–0.2)
BASOPHILS NFR BLD AUTO: 0.7 % — SIGNIFICANT CHANGE UP (ref 0–2)
BUN SERPL-MCNC: 8 MG/DL — SIGNIFICANT CHANGE UP (ref 7–23)
CALCIUM SERPL-MCNC: 9.2 MG/DL — SIGNIFICANT CHANGE UP (ref 8.4–10.5)
CHLORIDE SERPL-SCNC: 105 MMOL/L — SIGNIFICANT CHANGE UP (ref 96–108)
CO2 SERPL-SCNC: 23 MMOL/L — SIGNIFICANT CHANGE UP (ref 22–31)
CREAT SERPL-MCNC: 0.58 MG/DL — SIGNIFICANT CHANGE UP (ref 0.5–1.3)
CULTURE RESULTS: NO GROWTH — SIGNIFICANT CHANGE UP
CULTURE RESULTS: SIGNIFICANT CHANGE UP
EOSINOPHIL # BLD AUTO: 0.1 K/UL — SIGNIFICANT CHANGE UP (ref 0–0.5)
EOSINOPHIL NFR BLD AUTO: 1.8 % — SIGNIFICANT CHANGE UP (ref 0–6)
GLUCOSE SERPL-MCNC: 116 MG/DL — HIGH (ref 70–99)
HCT VFR BLD CALC: 35.3 % — SIGNIFICANT CHANGE UP (ref 34.5–45)
HGB BLD-MCNC: 12.5 G/DL — SIGNIFICANT CHANGE UP (ref 11.5–15.5)
IMM GRANULOCYTES NFR BLD AUTO: 0.4 % — SIGNIFICANT CHANGE UP (ref 0–1.5)
LYMPHOCYTES # BLD AUTO: 2.22 K/UL — SIGNIFICANT CHANGE UP (ref 1–3.3)
LYMPHOCYTES # BLD AUTO: 39.6 % — SIGNIFICANT CHANGE UP (ref 13–44)
MAGNESIUM SERPL-MCNC: 2.2 MG/DL — SIGNIFICANT CHANGE UP (ref 1.6–2.6)
MCHC RBC-ENTMCNC: 33.8 PG — SIGNIFICANT CHANGE UP (ref 27–34)
MCHC RBC-ENTMCNC: 35.4 GM/DL — SIGNIFICANT CHANGE UP (ref 32–36)
MCV RBC AUTO: 95.4 FL — SIGNIFICANT CHANGE UP (ref 80–100)
METHOD TYPE: SIGNIFICANT CHANGE UP
MONOCYTES # BLD AUTO: 0.82 K/UL — SIGNIFICANT CHANGE UP (ref 0–0.9)
MONOCYTES NFR BLD AUTO: 14.6 % — HIGH (ref 2–14)
NEUTROPHILS # BLD AUTO: 2.41 K/UL — SIGNIFICANT CHANGE UP (ref 1.8–7.4)
NEUTROPHILS NFR BLD AUTO: 42.9 % — LOW (ref 43–77)
NRBC # BLD: 0 /100 WBCS — SIGNIFICANT CHANGE UP (ref 0–0)
ORGANISM # SPEC MICROSCOPIC CNT: SIGNIFICANT CHANGE UP
PHOSPHATE SERPL-MCNC: 3.1 MG/DL — SIGNIFICANT CHANGE UP (ref 2.5–4.5)
PLATELET # BLD AUTO: 165 K/UL — SIGNIFICANT CHANGE UP (ref 150–400)
POTASSIUM SERPL-MCNC: 3.9 MMOL/L — SIGNIFICANT CHANGE UP (ref 3.5–5.3)
POTASSIUM SERPL-SCNC: 3.9 MMOL/L — SIGNIFICANT CHANGE UP (ref 3.5–5.3)
RBC # BLD: 3.7 M/UL — LOW (ref 3.8–5.2)
RBC # FLD: 11.9 % — SIGNIFICANT CHANGE UP (ref 10.3–14.5)
SODIUM SERPL-SCNC: 139 MMOL/L — SIGNIFICANT CHANGE UP (ref 135–145)
SPECIMEN SOURCE: SIGNIFICANT CHANGE UP
SPECIMEN SOURCE: SIGNIFICANT CHANGE UP
WBC # BLD: 5.61 K/UL — SIGNIFICANT CHANGE UP (ref 3.8–10.5)
WBC # FLD AUTO: 5.61 K/UL — SIGNIFICANT CHANGE UP (ref 3.8–10.5)

## 2019-12-04 PROCEDURE — 95816 EEG AWAKE AND DROWSY: CPT

## 2019-12-04 PROCEDURE — 87798 DETECT AGENT NOS DNA AMP: CPT

## 2019-12-04 PROCEDURE — 80307 DRUG TEST PRSMV CHEM ANLYZR: CPT

## 2019-12-04 PROCEDURE — 84443 ASSAY THYROID STIM HORMONE: CPT

## 2019-12-04 PROCEDURE — 82803 BLOOD GASES ANY COMBINATION: CPT

## 2019-12-04 PROCEDURE — 99239 HOSP IP/OBS DSCHRG MGMT >30: CPT | Mod: GC

## 2019-12-04 PROCEDURE — 93005 ELECTROCARDIOGRAM TRACING: CPT

## 2019-12-04 PROCEDURE — 82435 ASSAY OF BLOOD CHLORIDE: CPT

## 2019-12-04 PROCEDURE — 87070 CULTURE OTHR SPECIMN AEROBIC: CPT

## 2019-12-04 PROCEDURE — 94002 VENT MGMT INPAT INIT DAY: CPT

## 2019-12-04 PROCEDURE — 87483 CNS DNA AMP PROBE TYPE 12-25: CPT

## 2019-12-04 PROCEDURE — 81001 URINALYSIS AUTO W/SCOPE: CPT

## 2019-12-04 PROCEDURE — 80053 COMPREHEN METABOLIC PANEL: CPT

## 2019-12-04 PROCEDURE — 85730 THROMBOPLASTIN TIME PARTIAL: CPT

## 2019-12-04 PROCEDURE — 83735 ASSAY OF MAGNESIUM: CPT

## 2019-12-04 PROCEDURE — 82550 ASSAY OF CK (CPK): CPT

## 2019-12-04 PROCEDURE — 82962 GLUCOSE BLOOD TEST: CPT

## 2019-12-04 PROCEDURE — 85014 HEMATOCRIT: CPT

## 2019-12-04 PROCEDURE — 87633 RESP VIRUS 12-25 TARGETS: CPT

## 2019-12-04 PROCEDURE — 84145 PROCALCITONIN (PCT): CPT

## 2019-12-04 PROCEDURE — 87040 BLOOD CULTURE FOR BACTERIA: CPT

## 2019-12-04 PROCEDURE — 99291 CRITICAL CARE FIRST HOUR: CPT | Mod: 25

## 2019-12-04 PROCEDURE — 80048 BASIC METABOLIC PNL TOTAL CA: CPT

## 2019-12-04 PROCEDURE — 96375 TX/PRO/DX INJ NEW DRUG ADDON: CPT | Mod: XU

## 2019-12-04 PROCEDURE — 84702 CHORIONIC GONADOTROPIN TEST: CPT

## 2019-12-04 PROCEDURE — 82947 ASSAY GLUCOSE BLOOD QUANT: CPT

## 2019-12-04 PROCEDURE — 70450 CT HEAD/BRAIN W/O DYE: CPT

## 2019-12-04 PROCEDURE — 31500 INSERT EMERGENCY AIRWAY: CPT

## 2019-12-04 PROCEDURE — 71260 CT THORAX DX C+: CPT

## 2019-12-04 PROCEDURE — 96374 THER/PROPH/DIAG INJ IV PUSH: CPT | Mod: XU

## 2019-12-04 PROCEDURE — 62270 DX LMBR SPI PNXR: CPT

## 2019-12-04 PROCEDURE — 82330 ASSAY OF CALCIUM: CPT

## 2019-12-04 PROCEDURE — 85027 COMPLETE CBC AUTOMATED: CPT

## 2019-12-04 PROCEDURE — 85610 PROTHROMBIN TIME: CPT

## 2019-12-04 PROCEDURE — 87186 SC STD MICRODIL/AGAR DIL: CPT

## 2019-12-04 PROCEDURE — 87486 CHLMYD PNEUM DNA AMP PROBE: CPT

## 2019-12-04 PROCEDURE — 43753 TX GASTRO INTUB W/ASP: CPT

## 2019-12-04 PROCEDURE — 94003 VENT MGMT INPAT SUBQ DAY: CPT

## 2019-12-04 PROCEDURE — 82945 GLUCOSE OTHER FLUID: CPT

## 2019-12-04 PROCEDURE — 71045 X-RAY EXAM CHEST 1 VIEW: CPT

## 2019-12-04 PROCEDURE — 84439 ASSAY OF FREE THYROXINE: CPT

## 2019-12-04 PROCEDURE — 87581 M.PNEUMON DNA AMP PROBE: CPT

## 2019-12-04 PROCEDURE — 83036 HEMOGLOBIN GLYCOSYLATED A1C: CPT

## 2019-12-04 PROCEDURE — 84132 ASSAY OF SERUM POTASSIUM: CPT

## 2019-12-04 PROCEDURE — 96361 HYDRATE IV INFUSION ADD-ON: CPT | Mod: XU

## 2019-12-04 PROCEDURE — 84157 ASSAY OF PROTEIN OTHER: CPT

## 2019-12-04 PROCEDURE — 84295 ASSAY OF SERUM SODIUM: CPT

## 2019-12-04 PROCEDURE — 83605 ASSAY OF LACTIC ACID: CPT

## 2019-12-04 PROCEDURE — 84100 ASSAY OF PHOSPHORUS: CPT

## 2019-12-04 PROCEDURE — 89051 BODY FLUID CELL COUNT: CPT

## 2019-12-04 PROCEDURE — 87150 DNA/RNA AMPLIFIED PROBE: CPT

## 2019-12-04 PROCEDURE — 74177 CT ABD & PELVIS W/CONTRAST: CPT

## 2019-12-04 PROCEDURE — 87205 SMEAR GRAM STAIN: CPT

## 2019-12-04 RX ORDER — CIPROFLOXACIN LACTATE 400MG/40ML
1 VIAL (ML) INTRAVENOUS
Qty: 14 | Refills: 0
Start: 2019-12-04 | End: 2019-12-10

## 2019-12-04 RX ORDER — CIPROFLOXACIN LACTATE 400MG/40ML
500 VIAL (ML) INTRAVENOUS EVERY 12 HOURS
Refills: 0 | Status: DISCONTINUED | OUTPATIENT
Start: 2019-12-04 | End: 2019-12-04

## 2019-12-04 RX ORDER — CEFUROXIME AXETIL 250 MG
500 TABLET ORAL EVERY 12 HOURS
Refills: 0 | Status: DISCONTINUED | OUTPATIENT
Start: 2019-12-04 | End: 2019-12-04

## 2019-12-04 RX ORDER — ACETAMINOPHEN 500 MG
2 TABLET ORAL
Qty: 0 | Refills: 0 | DISCHARGE
Start: 2019-12-04

## 2019-12-04 RX ADMIN — CEFEPIME 100 MILLIGRAM(S): 1 INJECTION, POWDER, FOR SOLUTION INTRAMUSCULAR; INTRAVENOUS at 05:48

## 2019-12-04 RX ADMIN — Medication 500 MILLIGRAM(S): at 13:57

## 2019-12-04 RX ADMIN — Medication 1 TABLET(S): at 13:57

## 2019-12-04 RX ADMIN — PANTOPRAZOLE SODIUM 40 MILLIGRAM(S): 20 TABLET, DELAYED RELEASE ORAL at 10:50

## 2019-12-04 RX ADMIN — ENOXAPARIN SODIUM 40 MILLIGRAM(S): 100 INJECTION SUBCUTANEOUS at 13:57

## 2019-12-04 NOTE — PROGRESS NOTE ADULT - SUBJECTIVE AND OBJECTIVE BOX
ISABELA VARNER  34y  Female    Complaints:  Subjective:   Pt resting comfortably in bed, no complaints or any pains currently.        Vital Signs Last 24 Hrs  T(C): 36.8 (04 Dec 2019 05:03), Max: 37.2 (03 Dec 2019 17:00)  T(F): 98.3 (04 Dec 2019 05:03), Max: 98.9 (03 Dec 2019 17:00)  HR: 83 (04 Dec 2019 05:03) (83 - 93)  BP: 93/63 (04 Dec 2019 05:03) (93/63 - 109/71)  BP(mean): --  RR: 18 (04 Dec 2019 05:03) (18 - 20)  SpO2: 98% (04 Dec 2019 05:03) (96% - 100%)    PHYSICAL EXAM:  GENERAL: NAD, well-groomed, well-developed  HEENT - NC/AT  CHEST/LUNG: Clear to auscultation bilaterally; No rales, rhonchi, wheezing  HEART: Regular rate and rhythm; No murmurs, rubs, or gallops  ABDOMEN: Soft, Nontender, Nondistended; Bowel sounds present  G/U: No CVA tenderness  EXTREMITIES:  No clubbing, cyanosis, or edema  SKIN: No rashes or lesions    Consultant(s) Notes Reviewed:  [x ] YES  [ ] NO  Care Discussed with Consultants/Other Providers [ x] YES  [ ] NO    LABS:                        12.5   5.61  )-----------( 165      ( 04 Dec 2019 07:19 )             35.3     12-04    139  |  105  |  8   ----------------------------<  116<H>  3.9   |  23  |  0.58    Ca    9.2      04 Dec 2019 07:19  Phos  3.1     12-04  Mg     2.2     12-04    TPro  6.4  /  Alb  3.6  /  TBili  0.6  /  DBili  x   /  AST  38  /  ALT  39  /  AlkPhos  68  12-03        RADIOLOGY & ADDITIONAL TESTS:    Imaging Personally Reviewed:  [ ] YES  [ ] NO  MedsMEDICATIONS  (STANDING):  enoxaparin Injectable 40 milliGRAM(s) SubCutaneous daily  pantoprazole    Tablet 40 milliGRAM(s) Oral before breakfast  prenatal multivitamin 1 Tablet(s) Oral daily    MEDICATIONS  (PRN):  acetaminophen   Tablet .. 650 milliGRAM(s) Oral every 6 hours PRN Temp greater or equal to 38C (100.4F)      HEALTH ISSUES - PROBLEM Dx:  Discharge planning issues: Discharge planning issues  Prophylactic measure: Prophylactic measure  GERD (gastroesophageal reflux disease): GERD (gastroesophageal reflux disease)  Hypothyroidism: Hypothyroidism  Seizure: Seizure  Bacteremia due to Escherichia coli: Bacteremia due to Escherichia coli  Pyelonephritis: Pyelonephritis ISABELA VARNER  34y  Female    Complaints:  Subjective:   Luxembourgish speaking female. Pt resting comfortably in bed. Denies any chest pain, shortness of breath, abdominal pain, N/V/C/D, dysruia, abnormal color or urine, or flank pain.        Vital Signs Last 24 Hrs  T(C): 36.8 (04 Dec 2019 05:03), Max: 37.2 (03 Dec 2019 17:00)  T(F): 98.3 (04 Dec 2019 05:03), Max: 98.9 (03 Dec 2019 17:00)  HR: 83 (04 Dec 2019 05:03) (83 - 93)  BP: 93/63 (04 Dec 2019 05:03) (93/63 - 109/71)  BP(mean): --  RR: 18 (04 Dec 2019 05:03) (18 - 20)  SpO2: 98% (04 Dec 2019 05:03) (96% - 100%)    PHYSICAL EXAM:  GENERAL: NAD, well-groomed, well-developed  HEENT - NC/AT  CHEST/LUNG: Clear to auscultation bilaterally; No rales, rhonchi, wheezing  HEART: Regular rate and rhythm; No murmurs, rubs, or gallops  ABDOMEN: Soft, Nontender, Nondistended; Bowel sounds present  G/U: No CVA tenderness  EXTREMITIES:  No clubbing, cyanosis, or edema  SKIN: No rashes or lesions    Consultant(s) Notes Reviewed:  [x ] YES  [ ] NO  Care Discussed with Consultants/Other Providers [ x] YES  [ ] NO    LABS:                        12.5   5.61  )-----------( 165      ( 04 Dec 2019 07:19 )             35.3     12-04    139  |  105  |  8   ----------------------------<  116<H>  3.9   |  23  |  0.58    Ca    9.2      04 Dec 2019 07:19  Phos  3.1     12-04  Mg     2.2     12-04    TPro  6.4  /  Alb  3.6  /  TBili  0.6  /  DBili  x   /  AST  38  /  ALT  39  /  AlkPhos  68  12-03        RADIOLOGY & ADDITIONAL TESTS:    Imaging Personally Reviewed:  [ ] YES  [ ] NO  MedsMEDICATIONS  (STANDING):  enoxaparin Injectable 40 milliGRAM(s) SubCutaneous daily  pantoprazole    Tablet 40 milliGRAM(s) Oral before breakfast  prenatal multivitamin 1 Tablet(s) Oral daily    MEDICATIONS  (PRN):  acetaminophen   Tablet .. 650 milliGRAM(s) Oral every 6 hours PRN Temp greater or equal to 38C (100.4F)      HEALTH ISSUES - PROBLEM Dx:  Discharge planning issues: Discharge planning issues  Prophylactic measure: Prophylactic measure  GERD (gastroesophageal reflux disease): GERD (gastroesophageal reflux disease)  Hypothyroidism: Hypothyroidism  Seizure: Seizure  Bacteremia due to Escherichia coli: Bacteremia due to Escherichia coli  Pyelonephritis: Pyelonephritis ISABELA VARNER  34y  Female    Complaints:  Subjective:   Yoruba speaking female. Pt resting comfortably in bed. Denies any chest pain, shortness of breath, abdominal pain, N/V/C/D, dysruia, abnormal color or urine, or flank pain.   for ROS/PE in AM: [id number]   in PM to explain plan and ask clarifying questions on thyroid and PMD: [id number]   PMD: multiple physicians at "86 in [place]." Pt is unsure of any further details.    Vital Signs Last 24 Hrs  T(C): 36.8 (04 Dec 2019 05:03), Max: 37.2 (03 Dec 2019 17:00)  T(F): 98.3 (04 Dec 2019 05:03), Max: 98.9 (03 Dec 2019 17:00)  HR: 83 (04 Dec 2019 05:03) (83 - 93)  BP: 93/63 (04 Dec 2019 05:03) (93/63 - 109/71)  BP(mean): --  RR: 18 (04 Dec 2019 05:03) (18 - 20)  SpO2: 98% (04 Dec 2019 05:03) (96% - 100%)    PHYSICAL EXAM:  GENERAL: NAD, well-groomed, well-developed  HEENT - NC/AT  CHEST/LUNG: Clear to auscultation bilaterally; No rales, rhonchi, wheezing  HEART: Regular rate and rhythm; No murmurs, rubs, or gallops  ABDOMEN: Soft, Nontender, Nondistended; Bowel sounds present  G/U: No CVA tenderness  EXTREMITIES:  No clubbing, cyanosis, or edema  SKIN: No rashes or lesions    Consultant(s) Notes Reviewed:  [x ] YES  [ ] NO  Care Discussed with Consultants/Other Providers [ x] YES  [ ] NO    LABS:                        12.5   5.61  )-----------( 165      ( 04 Dec 2019 07:19 )             35.3     12-04    139  |  105  |  8   ----------------------------<  116<H>  3.9   |  23  |  0.58    Ca    9.2      04 Dec 2019 07:19  Phos  3.1     12-04  Mg     2.2     12-04    TPro  6.4  /  Alb  3.6  /  TBili  0.6  /  DBili  x   /  AST  38  /  ALT  39  /  AlkPhos  68  12-03        RADIOLOGY & ADDITIONAL TESTS:    Imaging Personally Reviewed:  [ ] YES  [ ] NO  MedsMEDICATIONS  (STANDING):  enoxaparin Injectable 40 milliGRAM(s) SubCutaneous daily  pantoprazole    Tablet 40 milliGRAM(s) Oral before breakfast  prenatal multivitamin 1 Tablet(s) Oral daily    MEDICATIONS  (PRN):  acetaminophen   Tablet .. 650 milliGRAM(s) Oral every 6 hours PRN Temp greater or equal to 38C (100.4F)      HEALTH ISSUES - PROBLEM Dx:  Discharge planning issues: Discharge planning issues  Prophylactic measure: Prophylactic measure  GERD (gastroesophageal reflux disease): GERD (gastroesophageal reflux disease)  Hypothyroidism: Hypothyroidism  Seizure: Seizure  Bacteremia due to Escherichia coli: Bacteremia due to Escherichia coli  Pyelonephritis: Pyelonephritis ISABELA VARNER  34y  Female    Complaints:  Subjective:   Yi speaking female. Pt resting comfortably in bed. Denies any chest pain, shortness of breath, abdominal pain, N/V/C/D, dysruia, abnormal color or urine, or flank pain.   for ROS/PE in AM: [id number]   in PM to explain plan and ask clarifying questions on thyroid and PMD: [id number]   PMD: multiple physicians at "86 in [place]." Pt is unsure of any further details.    Vital Signs Last 24 Hrs  T(C): 36.8 (04 Dec 2019 05:03), Max: 37.2 (03 Dec 2019 17:00)  T(F): 98.3 (04 Dec 2019 05:03), Max: 98.9 (03 Dec 2019 17:00)  HR: 83 (04 Dec 2019 05:03) (83 - 93)  BP: 93/63 (04 Dec 2019 05:03) (93/63 - 109/71)  BP(mean): --  RR: 18 (04 Dec 2019 05:03) (18 - 20)  SpO2: 98% (04 Dec 2019 05:03) (96% - 100%)    PHYSICAL EXAM:  GENERAL: NAD, well-groomed, well-developed  HEENT - NC/AT  CHEST/LUNG: Clear to auscultation bilaterally; No rales, rhonchi, wheezing  HEART: Regular rate and rhythm; No murmurs, rubs, or gallops  ABDOMEN: Soft, Nontender, Nondistended; Bowel sounds present  G/U: No CVA tenderness  EXTREMITIES:  No clubbing, cyanosis, or edema  SKIN: No rashes or lesions    Consultant(s) Notes Reviewed:  [x ] YES  [ ] NO  Care Discussed with Consultants/Other Providers [ x] YES  [ ] NO    LABS:                        12.5   5.61  )-----------( 165      ( 04 Dec 2019 07:19 )             35.3     12-04    139  |  105  |  8   ----------------------------<  116<H>  3.9   |  23  |  0.58    Ca    9.2      04 Dec 2019 07:19  Phos  3.1     12-04  Mg     2.2     12-04    TPro  6.4  /  Alb  3.6  /  TBili  0.6  /  DBili  x   /  AST  38  /  ALT  39  /  AlkPhos  68  12-03        RADIOLOGY & ADDITIONAL TESTS:    Imaging Personally Reviewed:  [ ] YES  [ ] NO  MedsMEDICATIONS  (STANDING):  enoxaparin Injectable 40 milliGRAM(s) SubCutaneous daily  pantoprazole    Tablet 40 milliGRAM(s) Oral before breakfast  prenatal multivitamin 1 Tablet(s) Oral daily    MEDICATIONS  (PRN):  acetaminophen   Tablet .. 650 milliGRAM(s) Oral every 6 hours PRN Temp greater or equal to 38C (100.4F)      HEALTH ISSUES - PROBLEM Dx:  Discharge planning issues: Discharge planning issues  Prophylactic measure: Prophylactic measure  GERD (gastroesophageal reflux disease): GERD (gastroesophageal reflux disease)  Hypothyroidism: Hypothyroidism  Seizure: Seizure  Bacteremia due to Escherichia coli: Bacteremia due to Escherichia coli  Pyelonephritis: Pyelonephritis

## 2019-12-04 NOTE — PROGRESS NOTE ADULT - PROBLEM SELECTOR PLAN 1
Last blood Cx that grew bacteria was on 12/01.  - Sensitivities allow broad options for Abx regimen, including Cefepime.  - Today is day 4 of Cefepime out of 10-14 days. - BCx 12/01 positive for pansensitive E. Coli    - Repeat BCx 12/3: negative  - afebrile overnight, hemodynamically stable   - will start pt on PO Cipro 500mg BID for 7 days, course to be complete on 12/9/19

## 2019-12-04 NOTE — PROGRESS NOTE ADULT - PROBLEM SELECTOR PLAN 8
Patient alert and responding to questions appropriately. Affect appropriate.  - Likely an effect of bacteremia or seizure, now resolving.

## 2019-12-04 NOTE — DISCHARGE NOTE PROVIDER - NSDCMRMEDTOKEN_GEN_ALL_CORE_FT
omeprazole 40 mg oral delayed release capsule: 1 cap(s) orally once a day acetaminophen 325 mg oral tablet: 2 tab(s) orally every 6 hours, As needed, Temp greater or equal to 38C (100.4F)  ciprofloxacin 500 mg oral tablet: 1 tab(s) orally every 12 hours  omeprazole 40 mg oral delayed release capsule: 1 cap(s) orally once a day

## 2019-12-04 NOTE — PROGRESS NOTE ADULT - PROBLEM SELECTOR PLAN 2
Currently Dejon sign negative. Pt says she has not felt CVA tenderness since yesterday.  - Currently on day 4 of Cefepime out of 10-14 days.  - No overnight fevers  - Not tachycardic on exam. (However, max last 24 hours 99) - Currently Dejon sign negative. Pt says she has not felt CVA tenderness since yesterday.  - will start pt on PO Cipro 500mg BID for 7 days, course to be complete on 12/9/19

## 2019-12-04 NOTE — DISCHARGE NOTE PROVIDER - CARE PROVIDER_API CALL
Miguel A Mcdonald)  Internal Medicine  [address] [facility], [facility] [address]  [place] [address]  Phone: [phone]  Fax: [phone]  Follow Up Time: Miguel A Mcdonald)  Internal Medicine  [address] [facility] [facility] [address]  [place] [address]  Phone: [phone]  Fax: [phone]  Follow Up Time:

## 2019-12-04 NOTE — PROGRESS NOTE ADULT - PROBLEM SELECTOR PLAN 4
Has never taken medications for hypothyroidism.  - Last TSH was WNL: 2.47 on 10/01/2019 Has a mild sore throat she attributes to GERD.  - Ppx Protonix 40mg daily

## 2019-12-04 NOTE — PROGRESS NOTE ADULT - PROBLEM SELECTOR PLAN 9
No fevers in past 24 hours.  - Day 4 of 10-14 days of cefepime  - Will draw blood cultures if spiked fevers again

## 2019-12-04 NOTE — DISCHARGE NOTE PROVIDER - HOSPITAL COURSE
HPI: 33 y/o German-speaking female with medical history significant for GERD, hypothyroidism and obesity brought in from home for one day history of abdominal pain and chills. The patient started having abdominal pain 5 :30 pm yesterday that spontaneously resolved.  This morning the patient went to work feeling better; however later in the day the abdominal pain returned.  Per , the patient described the abdominal pain as located in all 4 quadrants simultaneously patient became more altered not knowing the year but oriented to self and place.  called EMS; however, instructed by police to drop patient off at nearest emergency room.  Upon presentation to the ED tonight, patient became increasingly agitated and confused.  ED physician gave patient versed; however, patient was still agitated and violent; had 6 staff members hold her down to bed.  Decision was made by ED attending to have patient intubated and sedated for airway protection, further work-up and IV access.            Hospital course: pt was admitted for septic shock 2/2 acute pyelonephritis, found to have E. Coli bacteremia. Pt was extubated on 12/2/19. EEG was negative for seizure. Pt was then transferred on to the floor for further care. repeated BCx was negative. Pt will be discharged to home on PO Cipro for 10 days HPI: 33 y/o Tamaz[place]-speaking female with medical history significant for GERD, hypothyroidism and obesity brought in from home for one day history of abdominal pain and chills. The patient started having abdominal pain 5 :30 pm yesterday that spontaneously resolved.  This morning the patient went to work feeling better; however later in the day the abdominal pain returned.  Per , the patient described the abdominal pain as located in all 4 quadrants simultaneously patient became more altered not knowing the year but oriented to self and place.  called EMS; however, instructed by police to drop patient off at nearest emergency room.  Upon presentation to the ED tonight, patient became increasingly agitated and confused.  ED physician gave patient versed; however, patient was still agitated and violent; had 6 staff members hold her down to bed.  Decision was made by ED attending to have patient intubated and sedated for airway protection, further work-up and IV access.         Pt was extubated on 12/2/19.     Hospital course: pt was admitted for septic shock 2/2 acute pyelonephritis, found to have E. Coli bacteremia. EEG was negative for seizure. Septic shock resolved, pt was extubated on 12/2/19, and transferred on to the floor for further care. repeated BCx was negative. Pt will be discharged to home on PO Cipro for 10 days HPI: 33 y/o Yoruba-speaking female with medical history significant for GERD, hypothyroidism and obesity brought in from home for one day history of abdominal pain and chills. The patient started having abdominal pain 5 :30 pm yesterday that spontaneously resolved.  This morning the patient went to work feeling better; however later in the day the abdominal pain returned.  Per , the patient described the abdominal pain as located in all 4 quadrants simultaneously patient became more altered not knowing the year but oriented to self and place.  called EMS; however, instructed by police to drop patient off at nearest emergency room.  Upon presentation to the ED tonight, patient became increasingly agitated and confused.  ED physician gave patient versed; however, patient was still agitated and violent; had 6 staff members hold her down to bed.  Decision was made by ED attending to have patient intubated and sedated for airway protection, further work-up and IV access.         Pt was extubated on 12/2/19.     Hospital course: pt was admitted for septic shock 2/2 acute pyelonephritis, found to have E. Coli bacteremia. EEG was negative for seizure. Septic shock resolved, pt was extubated on 12/2/19, and transferred on to the floor for further care. repeated BCx was negative. Pt will be discharged to home on PO Cipro for 7 days. Pt will f/u with PCP at residents clinic at [address] [place] [address] HPI: 33 y/o Yakut-speaking female with medical history significant for GERD, hypothyroidism and obesity brought in from home for one day history of abdominal pain and chills. The patient started having abdominal pain 5 :30 pm yesterday that spontaneously resolved.  This morning the patient went to work feeling better; however later in the day the abdominal pain returned.  Per , the patient described the abdominal pain as located in all 4 quadrants simultaneously patient became more altered not knowing the year but oriented to self and place.  called EMS; however, instructed by police to drop patient off at nearest emergency room.  Upon presentation to the ED tonight, patient became increasingly agitated and confused.  ED physician gave patient versed; however, patient was still agitated and violent; had 6 staff members hold her down to bed.  Decision was made by ED attending to have patient intubated and sedated for airway protection, further work-up and IV access.             Hospital course: pt was admitted for septic shock 2/2 acute pyelonephritis, found to have E. Coli bacteremia. EEG was negative for seizure. Septic shock resolved, pt was extubated on 12/2/19, and transferred on to the floor for further care. repeated BCx was negative. Pt will be discharged to home on PO Cipro for 7 days. Pt will f/u with PCP at residents clinic at [address] [place] [address]

## 2019-12-04 NOTE — DISCHARGE NOTE NURSING/CASE MANAGEMENT/SOCIAL WORK - PATIENT PORTAL LINK FT
You can access the FollowMyHealth Patient Portal offered by [facility] by registering at the following website: [email or web address]. By joining [facility]’s FollowMyHealth portal, you will also be able to view your health information using other applications (apps) compatible with our system.

## 2019-12-04 NOTE — PROGRESS NOTE ADULT - PROBLEM SELECTOR PLAN 10
Will keep an extra day to be sure blood Cx are negative.  Patient currently does not have any symptoms. Abx sensitivities suggest ciprofloxacin as a possible oral medication for discharge. Will keep one more day to be sure blood Cx are negative.  Patient currently does not have any symptoms. Abx sensitivities suggest ciprofloxacin as a possible oral medication for discharge.

## 2019-12-04 NOTE — DISCHARGE NOTE PROVIDER - NSDCCPCAREPLAN_GEN_ALL_CORE_FT
PRINCIPAL DISCHARGE DIAGNOSIS  Diagnosis: Acute pyelonephritis  Assessment and Plan of Treatment: You are admitted to the hospital because of severe urinary tract infection that affects your kidney, and spread to the blood . You were in the Medical Intensive Care Unit briefly. You were given antibiotics, and your symptoms improved.  Your blood culture is negative for infection. You will be discharged to home with a medication called Ciprofloxacin, please take the medication 2 times per day. Your last dose will be on Dec 9, 2019. Please follow up your primary care doctor, Dr. Huang, at [address] [place] [address], in 1-2 weeks after discharge PRINCIPAL DISCHARGE DIAGNOSIS  Diagnosis: Acute pyelonephritis  Assessment and Plan of Treatment: You are admitted to the hospital because of severe urinary tract infection that affects your kidney, and spread to the blood . You were in the Medical Intensive Care Unit briefly. You were given antibiotics, and your symptoms improved.  Your blood culture is negative for infection. You will be discharged to home with a medication called Ciprofloxacin, please take the medication 2 times per day. Your last dose will be on Dec 9, 2019. Please follow up your primary care doctor, Dr. Huang, at [address] [place] [address], in 1-2 weeks after discharge PRINCIPAL DISCHARGE DIAGNOSIS  Diagnosis: Acute pyelonephritis  Assessment and Plan of Treatment: You are admitted to the hospital because of severe urinary tract infection that affects your kidney, and spread to the blood . You were in the Medical Intensive Care Unit briefly. You were given antibiotics, and your symptoms improved.  Your blood culture is negative for infection. You will be discharged to home with a medication called Ciprofloxacin, please take the medication 2 times per day. Your last dose will be on Dec 9, 2019. Please follow up your primary care doctor, Dr. Huang, at [address] [place] [address].  You have an appointment on 12/9 10:30 AM

## 2019-12-04 NOTE — PROGRESS NOTE ADULT - ASSESSMENT
This is a 34F with PMH of hypothyroidism and obesity with a chief complaint of sepsis 2/2 to E. coli bacteremai 2/2 to presumably pyelonephritis complicated by seizure, now stable, afebrile, and on cefepime. This is a 34F with PMH of hypothyroidism presents with fever, chills, dysuria, admit for sepsis 2/2 acute pyelonephritis and E. Coli bacteremia. Sepsis now resolved. Repeat BCx negative. Will transition pt to PO Abx before discharge.

## 2019-12-04 NOTE — PROGRESS NOTE ADULT - PROBLEM SELECTOR PLAN 6
Ppx Enoxaparin injectable 40mg SubQ daily Transitions of Care Status:  1.  Name of PCP: Dr. Jose Huang, [address] [place] [address]    2.  PCP Contacted on Admission: [ ] Y    [ ] N    3.  PCP contacted at Discharge: [x ] Y    [ ] N    [ ] N/A  4.  Post-Discharge Appointment Date and Location:  [address] [place] [address], 12/9 10:30 AM   5.  Summary of Handoff given to PCP: yes

## 2019-12-04 NOTE — DISCHARGE NOTE PROVIDER - NSDCFUSCHEDAPPT_GEN_ALL_CORE_FT
ISABELA VARNER ; 01/08/2020 ; NPP Med Int [address] [facility]  ISABELA VARNER ; 01/21/2020 ; NPP OtoLaryng [address] [facility]  ISABELA VARNER ; 01/23/2020 ; NPP Endocrin [address] ISABELA [place] ; 03/03/2020 ; NPP Gastro [address] ISABELA VARNER ; 01/08/2020 ; NPP Med Int [address] [facility]  ISABELA VARNER ; 01/21/2020 ; NPP OtoLaryng [address] [facility]  ISABELA VARNER ; 01/23/2020 ; NPP Endocrin [address] IS[facility]EL[facility] [place] ; 03/03/2020 ; NPP Gastro [address] ISABELA VARNER ; 01/08/2020 ; NPP Med Int [address] [facility]  ISABELA VARNER ; 01/21/2020 ; NPP OtoLaryng [address] [facility]  ISABELA VARNER ; 01/23/2020 ; NPP Endocrin [address] ISABELA [place] ; 03/03/2020 ; NPP Gastro [address] ISABELA VARNER ; 01/08/2020 ; NPP Med Int [address] [facility]  ISABELA VANRER ; 01/21/2020 ; NPP OtoLaryng [address] [facility]  ISABELA VARNER ; 01/23/2020 ; NPP Endocrin [address] ISABELA [place] ; 03/03/2020 ; NPP Gastro [address] ISABELA VARNER ; 12/09/2019 ; NPP Med Int [address] [facility]  ISABELA VARNER ; 01/08/2020 ; NPP Med Int [address] [facility]  ISABELA VARNER ; 01/21/2020 ; NPP OtoLaryng [address] [place] [address]  ISABELA VARNER ; 01/23/2020 ; NPP Endocrin [address] ISABELA [place] ; 03/03/2020 ; NPP Gastro [address]

## 2019-12-04 NOTE — PROGRESS NOTE ADULT - ATTENDING COMMENTS
Pt seen and examined. 34F with severe sepsis 2/2 pyelonephritis, further complicated by E.Coli bacteremia s/p IV cefepime x 4 days. Culture sensitive to po options. Repeat BCx negative. Pt has remained hemodynamically stable without complication, for discharge. She is to complete a course of abx for bacteremia and to follow up with PMD    43 minutes spent on discharge process    Carly Boyd MD  Division of Hospital Medicine  Cell: [phone]  Pager: [phone]  Office: [phone]

## 2019-12-04 NOTE — PROGRESS NOTE ADULT - REASON FOR ADMISSION
abdominal pain, agitation, altered mental status

## 2019-12-04 NOTE — PROGRESS NOTE ADULT - PROBLEM SELECTOR PLAN 3
Pt had seizure and accompanying AMS, fever of 107, and CPK level of 775 on 12/10/2019.  - EEG negative  - CSF PCR negative, gram stain negative, segmented neutrophils 32, lymphocytes 32 Has never taken medications for hypothyroidism.  - Last TSH was WNL: 2.47 on 10/01/2019

## 2019-12-04 NOTE — PROGRESS NOTE ADULT - PROBLEM SELECTOR PLAN 5
Has a mild sore throat she attributes to GERD.  - Ppx Protonix 40mg daily Ppx Enoxaparin injectable 40mg SubQ daily

## 2019-12-05 ENCOUNTER — INBOUND DOCUMENT (OUTPATIENT)
Age: 34
End: 2019-12-05

## 2019-12-05 NOTE — DISCUSSION/SUMMARY
[Home] : patient was discharged to home [FreeTextEntry1] : 35 y/o Australian-speaking female with medical history significant for GERD, hypothyroidism and obesity brought in from home for one day history of abdominal pain and chills. The patient started having abdominal pain 5 :30 pm yesterday that spontaneously resolved.  This morning the patient went to work feeling better; however later in the day the abdominal pain returned.  Per , the patient described the abdominal pain as located in all 4 quadrants simultaneously patient became more altered not knowing the year but oriented to self and place.  called EMS; however, instructed by police to drop patient off at nearest emergency room.  Upon presentation to the ED tonight, patient became increasingly agitated and confused.  ED physician gave patient versed; however, patient was still agitated and violent; had 6 staff members hold her down to bed.  Decision was made by ED attending to have patient intubated and sedated for airway protection, further work-up and IV access.\par \par  \par Hospital course: pt was admitted for septic shock 2/2 acute pyelonephritis, found to have E. Coli bacteremia. EEG was negative for seizure. Septic shock resolved, pt was extubated on 12/2/19, and transferred on to the floor for further care. repeated BCx was negative. Pt will be discharged to home on PO Cipro for 7 days. Pt will f/u with PCP at residents clinic at [address] [place] [address] \par \par pt has upcoming appt with Dr. Ayers on 12/9

## 2019-12-05 NOTE — DISCUSSION/SUMMARY
[Home] : patient was discharged to home [FreeTextEntry1] : 35 y/o Greek-speaking female with medical history significant for GERD, hypothyroidism and obesity brought in from home for one day history of abdominal pain and chills. The patient started having abdominal pain 5 :30 pm yesterday that spontaneously resolved.  This morning the patient went to work feeling better; however later in the day the abdominal pain returned.  Per , the patient described the abdominal pain as located in all 4 quadrants simultaneously patient became more altered not knowing the year but oriented to self and place.  called EMS; however, instructed by police to drop patient off at nearest emergency room.  Upon presentation to the ED tonight, patient became increasingly agitated and confused.  ED physician gave patient versed; however, patient was still agitated and violent; had 6 staff members hold her down to bed.  Decision was made by ED attending to have patient intubated and sedated for airway protection, further work-up and IV access.\par \par  \par Hospital course: pt was admitted for septic shock 2/2 acute pyelonephritis, found to have E. Coli bacteremia. EEG was negative for seizure. Septic shock resolved, pt was extubated on 12/2/19, and transferred on to the floor for further care. repeated BCx was negative. Pt will be discharged to home on PO Cipro for 7 days. Pt will f/u with PCP at residents clinic at [address] [place] [address] \par \par pt has upcoming appt with Dr. Ayers on 12/9

## 2019-12-06 LAB
CULTURE RESULTS: SIGNIFICANT CHANGE UP
SPECIMEN SOURCE: SIGNIFICANT CHANGE UP

## 2019-12-06 NOTE — DISCUSSION/SUMMARY
[FreeTextEntry1] : Received documentation from Hospital discharge\par Pt presented with abdominal pain and chill and she was agitated and confused, which she was subsequently intubated for airway protection. She was admitted to ICU for septic shock 2/2 acute pyelonephritis and E. coli bacteremia, started on IV abx EEG negative for seizure. Pt was extubated on 12/2/2019 and transferred to medicine floor. She was subsequently discharged with cipro for another 7 days. Has follow up with Dr. Ayers on 12/9/2019\par \par

## 2019-12-09 ENCOUNTER — APPOINTMENT (OUTPATIENT)
Dept: INTERNAL MEDICINE | Facility: CLINIC | Age: 34
End: 2019-12-09

## 2019-12-09 ENCOUNTER — OUTPATIENT (OUTPATIENT)
Dept: OUTPATIENT SERVICES | Facility: HOSPITAL | Age: 34
LOS: 1 days | End: 2019-12-09
Payer: SELF-PAY

## 2019-12-09 VITALS
HEIGHT: 61.5 IN | DIASTOLIC BLOOD PRESSURE: 70 MMHG | WEIGHT: 173 LBS | SYSTOLIC BLOOD PRESSURE: 124 MMHG | BODY MASS INDEX: 32.24 KG/M2

## 2019-12-09 DIAGNOSIS — R94.5 ABNORMAL RESULTS OF LIVER FUNCTION STUDIES: ICD-10-CM

## 2019-12-09 DIAGNOSIS — I10 ESSENTIAL (PRIMARY) HYPERTENSION: ICD-10-CM

## 2019-12-09 DIAGNOSIS — E07.9 DISORDER OF THYROID, UNSPECIFIED: ICD-10-CM

## 2019-12-09 DIAGNOSIS — K21.9 GASTRO-ESOPHAGEAL REFLUX DISEASE WITHOUT ESOPHAGITIS: ICD-10-CM

## 2019-12-09 DIAGNOSIS — Z87.19 PERSONAL HISTORY OF OTHER DISEASES OF THE DIGESTIVE SYSTEM: ICD-10-CM

## 2019-12-09 DIAGNOSIS — Z09 ENCOUNTER FOR FOLLOW-UP EXAMINATION AFTER COMPLETED TREATMENT FOR CONDITIONS OTHER THAN MALIGNANT NEOPLASM: ICD-10-CM

## 2019-12-09 PROCEDURE — G0463: CPT

## 2020-01-08 ENCOUNTER — OUTPATIENT (OUTPATIENT)
Dept: OUTPATIENT SERVICES | Facility: HOSPITAL | Age: 35
LOS: 1 days | End: 2020-01-08
Payer: SELF-PAY

## 2020-01-08 ENCOUNTER — APPOINTMENT (OUTPATIENT)
Dept: INTERNAL MEDICINE | Facility: CLINIC | Age: 35
End: 2020-01-08

## 2020-01-08 VITALS
TEMPERATURE: 99.6 F | DIASTOLIC BLOOD PRESSURE: 70 MMHG | WEIGHT: 172 LBS | HEIGHT: 61.5 IN | BODY MASS INDEX: 32.06 KG/M2 | OXYGEN SATURATION: 98 % | SYSTOLIC BLOOD PRESSURE: 112 MMHG | HEART RATE: 80 BPM

## 2020-01-08 DIAGNOSIS — I10 ESSENTIAL (PRIMARY) HYPERTENSION: ICD-10-CM

## 2020-01-08 PROCEDURE — G0463: CPT

## 2020-01-09 NOTE — PHYSICAL EXAM
[No Acute Distress] : no acute distress [No Respiratory Distress] : no respiratory distress  [Well-Appearing] : well-appearing [Clear to Auscultation] : lungs were clear to auscultation bilaterally [Normal] : no respiratory distress, lungs were clear to auscultation bilaterally and no accessory muscle use [Normal Rate] : normal rate  [Regular Rhythm] : with a regular rhythm [Normal S1, S2] : normal S1 and S2 [No Murmur] : no murmur heard [Soft] : abdomen soft [Non Tender] : non-tender [Normal Bowel Sounds] : normal bowel sounds [Non-distended] : non-distended [Normal Affect] : the affect was normal [Alert and Oriented x3] : oriented to person, place, and time [Normal Mood] : the mood was normal [de-identified] : Obese

## 2020-01-09 NOTE — REVIEW OF SYSTEMS
[Fever] : no fever [Chills] : no chills [Palpitations] : no palpitations [Chest Pain] : no chest pain [Lower Ext Edema] : no lower extremity edema [Shortness Of Breath] : no shortness of breath [Dysuria] : no dysuria [Frequency] : no frequency [Back Pain] : no back pain [Hematuria] : no hematuria [Dizziness] : no dizziness [FreeTextEntry8] : no flank pain

## 2020-01-09 NOTE — PHYSICAL EXAM
[No Acute Distress] : no acute distress [No Respiratory Distress] : no respiratory distress  [Well-Appearing] : well-appearing [Clear to Auscultation] : lungs were clear to auscultation bilaterally [Normal] : no respiratory distress, lungs were clear to auscultation bilaterally and no accessory muscle use [Normal Rate] : normal rate  [Regular Rhythm] : with a regular rhythm [Normal S1, S2] : normal S1 and S2 [No Murmur] : no murmur heard [Soft] : abdomen soft [Non Tender] : non-tender [Normal Bowel Sounds] : normal bowel sounds [Non-distended] : non-distended [Normal Affect] : the affect was normal [Alert and Oriented x3] : oriented to person, place, and time [Normal Mood] : the mood was normal [de-identified] : Obese

## 2020-01-09 NOTE — HISTORY OF PRESENT ILLNESS
[FreeTextEntry8] :  35 y/o Liberian-speaking female with medical history significant for GERD, hypothyroidism and obesity with recent hospitalization for AMS from 12/1-12/4/19, abdominal pain and septic shock 2/2 acute pyelonephritis and E. coli bacteremia, s/p IV abx, extubated on 12/2/19, and discharged with PO cipro abx, presents for followup today. \par \par  ID: [id number]\par \par Still taking ciprofloxacin, has 4 more pills left. No abdominal pain. No fever. No flank or back pain. No dysuria or hematuria. Not compaining of any other symptoms.

## 2020-01-09 NOTE — HISTORY OF PRESENT ILLNESS
[FreeTextEntry8] :  33 y/o Serbian-speaking female with medical history significant for GERD, hypothyroidism and obesity with recent hospitalization for AMS from 12/1-12/4/19, abdominal pain and septic shock 2/2 acute pyelonephritis and E. coli bacteremia, s/p IV abx, extubated on 12/2/19, and discharged with PO cipro abx, presents for followup today. \par \par  ID: [id number]\par \par Still taking ciprofloxacin, has 4 more pills left. No abdominal pain. No fever. No flank or back pain. No dysuria or hematuria. Not compaining of any other symptoms.

## 2020-01-09 NOTE — REVIEW OF SYSTEMS
[Chills] : no chills [Fever] : no fever [Chest Pain] : no chest pain [Palpitations] : no palpitations [Dysuria] : no dysuria [Shortness Of Breath] : no shortness of breath [Lower Ext Edema] : no lower extremity edema [Back Pain] : no back pain [Hematuria] : no hematuria [Frequency] : no frequency [FreeTextEntry8] : no flank pain  [Dizziness] : no dizziness

## 2020-01-14 NOTE — ASSESSMENT
[FreeTextEntry1] : 34-yo F / PMH of GERD, hypothyroidism, obesity, and recent pyelonephritis c/b septic shock and E coli bacteremia (12/1 - 12/4/19), presenting for follow-up, with an acute complaint of lower back pain\par \par #Lower back pain\par - Bilateral lower back pain. No red flag symptoms including incontinence, sensation loss, or strength loss. Straight leg raise test neg. Gait WNL. Improves with OTC pain meds\par - Work involves intermittent heavy lifting. 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 Rogers

## 2020-01-14 NOTE — END OF VISIT
[] : Resident [FreeTextEntry3] : hx and PE most c/w lumbar strain. Plan as indicated. Patient advised to call immed for any worsening or if no resolution.

## 2020-01-14 NOTE — REVIEW OF SYSTEMS
[Back Pain] : back pain [Chills] : no chills [Fever] : no fever [Dryness] : no dryness  [Hot Flashes] : no hot flashes [Night Sweats] : no night sweats [Sore Throat] : no sore throat [Vision Problems] : no vision problems [Chest Pain] : no chest pain [Palpitations] : no palpitations [Lower Ext Edema] : no lower extremity edema [Wheezing] : no wheezing [Shortness Of Breath] : no shortness of breath [Orthopnea] : no orthopnea [Cough] : no cough [Dyspnea on Exertion] : no dyspnea on exertion [Abdominal Pain] : no abdominal pain [Nausea] : no nausea [Constipation] : no constipation [Vomiting] : no vomiting [Diarrhea] : diarrhea [Hematuria] : no hematuria [Dysuria] : no dysuria [Incontinence] : no incontinence [Hair Changes] : no hair changes [Frequency] : no frequency [Muscle Pain] : no muscle pain [Joint Pain] : no joint pain [Dizziness] : no dizziness [Headache] : no headache

## 2020-01-14 NOTE — HISTORY OF PRESENT ILLNESS
[FreeTextEntry1] : Follow-up [de-identified] : [facility]  ID [id number]\par \par 34-yo F / PMH of GERD, hypothyroidism, obesity, and recent pyelonephritis c/b septic shock and E coli bacteremia (12/1 - 12/4/19), presenting for follow-up. Patient presented to  clinic on 12/9/19 for post-hospitalization, now returning for further follow-up. Patient was continued on Cipro for 7 days on previous visit. Patient reports improvement of symptoms and has not had recurrence of pelvic pain, dysuria, or fevers. Now presents with bilateral lower back pain that has been going on for several days. Mild, throbbing pain that is associated with motion. Tylenol or Motrin provide some relief. No fecal or urinary incontinence. States that the quality of the back pain is not like the back pain from recent pyelonephritis. No dysuria, nausea, vomiting, fever, chills, or headache. Last BM was today, formed. LMP 1/4/20. Cycle each month. No recent menstrual pattern changes. Patient has an 18-mo old son, who has not been sick recently. No other sick contacts. No recent travels. 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 She and her  share responsibility of carrying her son and a bag carrying childcare products.

## 2020-01-14 NOTE — PHYSICAL EXAM
[No Extremity Clubbing/Cyanosis] : no extremity clubbing/cyanosis [No Edema] : there was no peripheral edema [Soft] : abdomen soft [No Rash] : no rash [Normal] : normal gait, coordination grossly intact, no focal deficits and deep tendon reflexes were 2+ and symmetric [de-identified] : No suprapubic tenderness [de-identified] : Negative straight leg raise test bilaterally [de-identified] : No midline tenderness

## 2020-01-16 DIAGNOSIS — M54.5 LOW BACK PAIN: ICD-10-CM

## 2020-01-20 NOTE — ASSESSMENT
[FreeTextEntry1] : 34F Bulgarian Speaking with GERD, hypothyroidism, obesity here for post hospital discharge followup after septic shock 2/2 pyelonephritis and E. coli bacteremia.\par \par - Currently feeling well, last CBC and CMP from 12/4/19 (5 days ago) all unremarkable, normal CBC and renal function.\par - Advised patient that she does not need followup labs at this time.\par - Advised to finish her cipro course (has 4 pills left)\par - Educated patient on drinking plenty of fluids and to seek medical attention soon if she experiences dysuria/abdominal pain to prevent ascension of infection to the kidneys/blood in the future. \par - gyn referral provided as pt desiring another pregnancy, wanted gyn evaluation. Advised daily folate intake. \par \par Discussed with Dr. Keane.\par Advised to RTC in 3-4 months for followup. 
[FreeTextEntry1] : 34F Pashto Speaking with GERD, hypothyroidism, obesity here for post hospital discharge followup after septic shock 2/2 pyelonephritis and E. coli bacteremia.\par \par - Currently feeling well, last CBC and CMP from 12/4/19 (5 days ago) all unremarkable, normal CBC and renal function.\par - Advised patient that she does not need followup labs at this time.\par - Advised to finish her cipro course (has 4 pills left)\par - Educated patient on drinking plenty of fluids and to seek medical attention soon if she experiences dysuria/abdominal pain to prevent ascension of infection to the kidneys/blood in the future. \par - gyn referral provided as pt desiring another pregnancy, wanted gyn evaluation. Advised daily folate intake. \par \par Discussed with Dr. Keane.\par Advised to RTC in 3-4 months for followup. 
Cough

## 2020-01-21 ENCOUNTER — APPOINTMENT (OUTPATIENT)
Dept: OTOLARYNGOLOGY | Facility: CLINIC | Age: 35
End: 2020-01-21
Payer: COMMERCIAL

## 2020-01-21 VITALS
HEART RATE: 79 BPM | HEIGHT: 61.5 IN | DIASTOLIC BLOOD PRESSURE: 71 MMHG | WEIGHT: 172 LBS | BODY MASS INDEX: 32.06 KG/M2 | SYSTOLIC BLOOD PRESSURE: 109 MMHG

## 2020-01-21 PROCEDURE — 99213 OFFICE O/P EST LOW 20 MIN: CPT | Mod: 25

## 2020-01-21 PROCEDURE — 31575 DIAGNOSTIC LARYNGOSCOPY: CPT

## 2020-01-21 NOTE — HISTORY OF PRESENT ILLNESS
[de-identified] : Pt is f/up for LPRD. Pt finished the omeprazole two weeks ago.  Pt says her symptoms have improved, she occasionally has globus sensation but overall feels much better.  She has been following the lifestyle modifications.  She denies a return of symptoms after finishing the Omeprazole.  She denies any dyspnea, dysphonia, otalgia.

## 2020-01-21 NOTE — PROCEDURE
[Globus] : globus [None] : none [Flexible Endoscope] : examined with the flexible endoscope [Serial Number: ___] : Serial Number: [unfilled] [de-identified] : No lesions in the NPx, OPx, HPx or larynx.  VC are mobile, airway patent.  Patient with mild persistent changes of LPRD - significantly improved from last visit.

## 2020-01-23 ENCOUNTER — APPOINTMENT (OUTPATIENT)
Dept: ENDOCRINOLOGY | Facility: HOSPITAL | Age: 35
End: 2020-01-23

## 2020-01-23 ENCOUNTER — OUTPATIENT (OUTPATIENT)
Dept: OUTPATIENT SERVICES | Facility: HOSPITAL | Age: 35
LOS: 1 days | End: 2020-01-23
Payer: SELF-PAY

## 2020-01-23 VITALS
BODY MASS INDEX: 32.02 KG/M2 | SYSTOLIC BLOOD PRESSURE: 112 MMHG | WEIGHT: 174 LBS | DIASTOLIC BLOOD PRESSURE: 77 MMHG | HEIGHT: 62 IN | HEART RATE: 68 BPM

## 2020-01-23 DIAGNOSIS — E06.9 THYROIDITIS, UNSPECIFIED: ICD-10-CM

## 2020-01-23 DIAGNOSIS — R79.89 OTHER SPECIFIED ABNORMAL FINDINGS OF BLOOD CHEMISTRY: ICD-10-CM

## 2020-01-23 DIAGNOSIS — R13.10 DYSPHAGIA, UNSPECIFIED: ICD-10-CM

## 2020-01-23 LAB — T4 FREE+ TSH PNL SERPL: 2.44 UIU/ML — SIGNIFICANT CHANGE UP (ref 0.27–4.2)

## 2020-01-23 PROCEDURE — G0463: CPT

## 2020-01-23 PROCEDURE — 84443 ASSAY THYROID STIM HORMONE: CPT

## 2020-02-12 ENCOUNTER — OUTPATIENT (OUTPATIENT)
Dept: OUTPATIENT SERVICES | Facility: HOSPITAL | Age: 35
LOS: 1 days | End: 2020-02-12
Payer: SELF-PAY

## 2020-02-12 ENCOUNTER — APPOINTMENT (OUTPATIENT)
Dept: INTERNAL MEDICINE | Facility: CLINIC | Age: 35
End: 2020-02-12

## 2020-02-12 VITALS
OXYGEN SATURATION: 98 % | DIASTOLIC BLOOD PRESSURE: 70 MMHG | SYSTOLIC BLOOD PRESSURE: 118 MMHG | HEIGHT: 62 IN | HEART RATE: 67 BPM | WEIGHT: 176 LBS | BODY MASS INDEX: 32.39 KG/M2

## 2020-02-12 DIAGNOSIS — I10 ESSENTIAL (PRIMARY) HYPERTENSION: ICD-10-CM

## 2020-02-12 PROCEDURE — G0463: CPT

## 2020-02-15 NOTE — ASSESSMENT
[FreeTextEntry1] : 34-yo F w/ PMH of GERD, hypothyroidism, obesity, and recent pyelonephritis c/b septic shock and E coli bacteremia (12/1 - 12/4/19), presenting for annual physical examination.\par \par #Obestiy\par - BMI 32.19. Patient does not exercise.\par - Advised to make lifestyle change to lose weight.\par \par #HCM\par - Flu shot UTD\par - Tdap UTD\par - No bloodwork today.\par - Pap smear due Sept 2020\par - RTC 3-6 months, or as necessary for acute issues\par \par Discussed with Dr. Rogers

## 2020-02-15 NOTE — REVIEW OF SYSTEMS
[Fever] : no fever [Chills] : no chills [Fatigue] : no fatigue [Hot Flashes] : no hot flashes [Night Sweats] : no night sweats [Discharge] : no discharge [Pain] : no pain [Redness] : no redness [Dryness] : no dryness  [Vision Problems] : no vision problems [Itching] : no itching [Earache] : no earache [Hearing Loss] : no hearing loss [Nosebleed] : no nosebleeds [Sore Throat] : no sore throat [Chest Pain] : no chest pain [Palpitations] : no palpitations [Lower Ext Edema] : no lower extremity edema [Orthopnea] : no orthopnea [Shortness Of Breath] : no shortness of breath [Wheezing] : no wheezing [Cough] : no cough [Dyspnea on Exertion] : no dyspnea on exertion [Abdominal Pain] : no abdominal pain [Nausea] : no nausea [Constipation] : no constipation [Diarrhea] : diarrhea [Vomiting] : no vomiting [Dysuria] : no dysuria [Incontinence] : no incontinence [Nocturia] : no nocturia [Hematuria] : no hematuria [Joint Pain] : no joint pain [Joint Stiffness] : no joint stiffness [Joint Swelling] : no joint swelling [Muscle Weakness] : no muscle weakness [Muscle Pain] : no muscle pain [Back Pain] : no back pain [Hair Changes] : no hair changes [Skin Rash] : no skin rash [Headache] : no headache [Dizziness] : no dizziness [Fainting] : no fainting [Confusion] : no confusion [Memory Loss] : no memory loss [Suicidal] : not suicidal [Insomnia] : no insomnia [Anxiety] : no anxiety [Depression] : no depression [Easy Bleeding] : no easy bleeding [Easy Bruising] : no easy bruising [Swollen Glands] : no swollen glands

## 2020-02-15 NOTE — HEALTH RISK ASSESSMENT
[Very Good] : ~his/her~  mood as very good [0-5] : 0-5 [1 or 2 (0 pts)] : 1 or 2 (0 points) [No] : In the past 12 months have you used drugs other than those required for medical reasons? No [Never (0 pts)] : Never (0 points) [0] : 2) Feeling down, depressed, or hopeless: Not at all (0) [Employed] : employed [With Family] : lives with family [Sexually Active] : sexually active [Feels Safe at Home] : Feels safe at home [Intercurrent hospitalizations] : was admitted to the hospital  [] :  [# Of Children ___] : has [unfilled] children [Fully functional (bathing, dressing, toileting, transferring, walking, feeding)] : Fully functional (bathing, dressing, toileting, transferring, walking, feeding) [Fully functional (using the telephone, shopping, preparing meals, housekeeping, doing laundry, using] : Fully functional and needs no help or supervision to perform IADLs (using the telephone, shopping, preparing meals, housekeeping, doing laundry, using transportation, managing medications and managing finances) [Patient reported PAP Smear was normal] : Patient reported PAP Smear was normal [] : No [de-identified] : 12/1 - 12/4 septic shock [Audit-CScore] : 0 [de-identified] : 0 [[id number]] : 0 [Change in mental status noted] : No change in mental status noted [Language] : denies difficulty with language [Behavior] : denies difficulty with behavior [Reasoning] : denies difficulty with reasoning [Reports changes in hearing] : Reports no changes in hearing [Reports changes in vision] : Reports no changes in vision [Reports normal functional visual acuity (ie: able to read med bottle)] : Reports poor functional visual acuity.  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

## 2020-02-15 NOTE — HISTORY OF PRESENT ILLNESS
[FreeTextEntry1] : Annual physical examination [de-identified] : [facility]  ID [id number]\par \par 34-yo F w/ PMH of GERD, hypothyroidism, obesity, and recent pyelonephritis c/b septic shock and E coli bacteremia (12/1 - 12/4/19) s/p IV ABx and discharged on PO Cipro, presenting for annual physical examination. 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 Currently no acute complaints, except for intermittent headache and lower abdominal pain. Takes Tylenol and Motrin for pain. Pain does not bother her. No fever, chills, nausea, vomiting, chest pain, SOB, coughs, constipation, or diarrhea associated with her pain. LMP 2nd this month. No changes in amount or cycle. Last pregnancy 2017. Currently sexually active with . Planning for another child. Otherwise Health Risk Assessment and Review Of Systems illustrated as below.

## 2020-02-15 NOTE — PHYSICAL EXAM
[No Edema] : there was no peripheral edema [No Extremity Clubbing/Cyanosis] : no extremity clubbing/cyanosis [Soft] : abdomen soft [Non Tender] : non-tender [No Masses] : no abdominal mass palpated [Non-distended] : non-distended [Normal Bowel Sounds] : normal bowel sounds [No HSM] : no HSM [Normal] : affect was normal and insight and judgment were intact [de-identified] : Obese abdomen

## 2020-02-19 DIAGNOSIS — E66.9 OBESITY, UNSPECIFIED: ICD-10-CM

## 2020-02-22 ENCOUNTER — EMERGENCY (EMERGENCY)
Facility: HOSPITAL | Age: 35
LOS: 1 days | Discharge: HOME | End: 2020-02-22
Attending: EMERGENCY MEDICINE
Payer: MEDICAID

## 2020-02-22 VITALS
RESPIRATION RATE: 18 BRPM | OXYGEN SATURATION: 100 % | SYSTOLIC BLOOD PRESSURE: 119 MMHG | TEMPERATURE: 98 F | DIASTOLIC BLOOD PRESSURE: 80 MMHG | HEART RATE: 88 BPM

## 2020-02-22 VITALS
RESPIRATION RATE: 20 BRPM | WEIGHT: 186.07 LBS | DIASTOLIC BLOOD PRESSURE: 69 MMHG | TEMPERATURE: 98 F | SYSTOLIC BLOOD PRESSURE: 123 MMHG | HEART RATE: 104 BPM | OXYGEN SATURATION: 100 % | HEIGHT: 62 IN

## 2020-02-22 LAB
ALBUMIN SERPL ELPH-MCNC: 4.4 G/DL — SIGNIFICANT CHANGE UP (ref 3.3–5)
ALP SERPL-CCNC: 87 U/L — SIGNIFICANT CHANGE UP (ref 40–120)
ALT FLD-CCNC: 36 U/L — SIGNIFICANT CHANGE UP (ref 10–45)
ANION GAP SERPL CALC-SCNC: 13 MMOL/L — SIGNIFICANT CHANGE UP (ref 5–17)
APPEARANCE UR: CLEAR — SIGNIFICANT CHANGE UP
APTT BLD: 32.2 SEC — SIGNIFICANT CHANGE UP (ref 27.5–36.3)
AST SERPL-CCNC: 19 U/L — SIGNIFICANT CHANGE UP (ref 10–40)
BASE EXCESS BLDV CALC-SCNC: 1.6 MMOL/L — SIGNIFICANT CHANGE UP (ref -2–2)
BASOPHILS # BLD AUTO: 0.07 K/UL — SIGNIFICANT CHANGE UP (ref 0–0.2)
BASOPHILS NFR BLD AUTO: 0.8 % — SIGNIFICANT CHANGE UP (ref 0–2)
BILIRUB SERPL-MCNC: 0.2 MG/DL — SIGNIFICANT CHANGE UP (ref 0.2–1.2)
BILIRUB UR-MCNC: NEGATIVE — SIGNIFICANT CHANGE UP
BUN SERPL-MCNC: 8 MG/DL — SIGNIFICANT CHANGE UP (ref 7–23)
CA-I SERPL-SCNC: 1.18 MMOL/L — SIGNIFICANT CHANGE UP (ref 1.12–1.3)
CALCIUM SERPL-MCNC: 9.7 MG/DL — SIGNIFICANT CHANGE UP (ref 8.4–10.5)
CHLORIDE BLDV-SCNC: 107 MMOL/L — SIGNIFICANT CHANGE UP (ref 96–108)
CHLORIDE SERPL-SCNC: 101 MMOL/L — SIGNIFICANT CHANGE UP (ref 96–108)
CO2 BLDV-SCNC: 28 MMOL/L — SIGNIFICANT CHANGE UP (ref 22–30)
CO2 SERPL-SCNC: 24 MMOL/L — SIGNIFICANT CHANGE UP (ref 22–31)
COLOR SPEC: COLORLESS — SIGNIFICANT CHANGE UP
CREAT SERPL-MCNC: 0.61 MG/DL — SIGNIFICANT CHANGE UP (ref 0.5–1.3)
DIFF PNL FLD: NEGATIVE — SIGNIFICANT CHANGE UP
EOSINOPHIL # BLD AUTO: 0.06 K/UL — SIGNIFICANT CHANGE UP (ref 0–0.5)
EOSINOPHIL NFR BLD AUTO: 0.7 % — SIGNIFICANT CHANGE UP (ref 0–6)
GAS PNL BLDV: 136 MMOL/L — SIGNIFICANT CHANGE UP (ref 135–145)
GAS PNL BLDV: SIGNIFICANT CHANGE UP
GAS PNL BLDV: SIGNIFICANT CHANGE UP
GLUCOSE BLDV-MCNC: 111 MG/DL — HIGH (ref 70–99)
GLUCOSE SERPL-MCNC: 115 MG/DL — HIGH (ref 70–99)
GLUCOSE UR QL: NEGATIVE — SIGNIFICANT CHANGE UP
HCO3 BLDV-SCNC: 27 MMOL/L — SIGNIFICANT CHANGE UP (ref 21–29)
HCT VFR BLD CALC: 41.9 % — SIGNIFICANT CHANGE UP (ref 34.5–45)
HCT VFR BLDA CALC: 45 % — SIGNIFICANT CHANGE UP (ref 39–50)
HGB BLD CALC-MCNC: 14.5 G/DL — SIGNIFICANT CHANGE UP (ref 11.5–15.5)
HGB BLD-MCNC: 14 G/DL — SIGNIFICANT CHANGE UP (ref 11.5–15.5)
IMM GRANULOCYTES NFR BLD AUTO: 0.3 % — SIGNIFICANT CHANGE UP (ref 0–1.5)
INR BLD: 0.91 RATIO — SIGNIFICANT CHANGE UP (ref 0.88–1.16)
KETONES UR-MCNC: NEGATIVE — SIGNIFICANT CHANGE UP
LACTATE BLDV-MCNC: 1.9 MMOL/L — SIGNIFICANT CHANGE UP (ref 0.7–2)
LEUKOCYTE ESTERASE UR-ACNC: NEGATIVE — SIGNIFICANT CHANGE UP
LYMPHOCYTES # BLD AUTO: 1.8 K/UL — SIGNIFICANT CHANGE UP (ref 1–3.3)
LYMPHOCYTES # BLD AUTO: 21 % — SIGNIFICANT CHANGE UP (ref 13–44)
MCHC RBC-ENTMCNC: 31.9 PG — SIGNIFICANT CHANGE UP (ref 27–34)
MCHC RBC-ENTMCNC: 33.4 GM/DL — SIGNIFICANT CHANGE UP (ref 32–36)
MCV RBC AUTO: 95.4 FL — SIGNIFICANT CHANGE UP (ref 80–100)
MONOCYTES # BLD AUTO: 0.47 K/UL — SIGNIFICANT CHANGE UP (ref 0–0.9)
MONOCYTES NFR BLD AUTO: 5.5 % — SIGNIFICANT CHANGE UP (ref 2–14)
NEUTROPHILS # BLD AUTO: 6.15 K/UL — SIGNIFICANT CHANGE UP (ref 1.8–7.4)
NEUTROPHILS NFR BLD AUTO: 71.7 % — SIGNIFICANT CHANGE UP (ref 43–77)
NITRITE UR-MCNC: NEGATIVE — SIGNIFICANT CHANGE UP
NRBC # BLD: 0 /100 WBCS — SIGNIFICANT CHANGE UP (ref 0–0)
PCO2 BLDV: 48 MMHG — SIGNIFICANT CHANGE UP (ref 35–50)
PH BLDV: 7.37 — SIGNIFICANT CHANGE UP (ref 7.35–7.45)
PH UR: 6 — SIGNIFICANT CHANGE UP (ref 5–8)
PLATELET # BLD AUTO: 202 K/UL — SIGNIFICANT CHANGE UP (ref 150–400)
PO2 BLDV: 28 MMHG — SIGNIFICANT CHANGE UP (ref 25–45)
POTASSIUM BLDV-SCNC: 3.9 MMOL/L — SIGNIFICANT CHANGE UP (ref 3.5–5.3)
POTASSIUM SERPL-MCNC: 4 MMOL/L — SIGNIFICANT CHANGE UP (ref 3.5–5.3)
POTASSIUM SERPL-SCNC: 4 MMOL/L — SIGNIFICANT CHANGE UP (ref 3.5–5.3)
PROT SERPL-MCNC: 7.5 G/DL — SIGNIFICANT CHANGE UP (ref 6–8.3)
PROT UR-MCNC: NEGATIVE — SIGNIFICANT CHANGE UP
PROTHROM AB SERPL-ACNC: 10.4 SEC — SIGNIFICANT CHANGE UP (ref 10–12.9)
RBC # BLD: 4.39 M/UL — SIGNIFICANT CHANGE UP (ref 3.8–5.2)
RBC # FLD: 11.9 % — SIGNIFICANT CHANGE UP (ref 10.3–14.5)
SAO2 % BLDV: 43 % — LOW (ref 67–88)
SODIUM SERPL-SCNC: 138 MMOL/L — SIGNIFICANT CHANGE UP (ref 135–145)
SP GR SPEC: 1.01 — LOW (ref 1.01–1.02)
TSH SERPL-MCNC: 3.75 UIU/ML — SIGNIFICANT CHANGE UP (ref 0.27–4.2)
UROBILINOGEN FLD QL: NEGATIVE — SIGNIFICANT CHANGE UP
WBC # BLD: 8.58 K/UL — SIGNIFICANT CHANGE UP (ref 3.8–10.5)
WBC # FLD AUTO: 8.58 K/UL — SIGNIFICANT CHANGE UP (ref 3.8–10.5)

## 2020-02-22 PROCEDURE — 84132 ASSAY OF SERUM POTASSIUM: CPT

## 2020-02-22 PROCEDURE — 80053 COMPREHEN METABOLIC PANEL: CPT

## 2020-02-22 PROCEDURE — 87086 URINE CULTURE/COLONY COUNT: CPT

## 2020-02-22 PROCEDURE — 99284 EMERGENCY DEPT VISIT MOD MDM: CPT

## 2020-02-22 PROCEDURE — 83605 ASSAY OF LACTIC ACID: CPT

## 2020-02-22 PROCEDURE — 82330 ASSAY OF CALCIUM: CPT

## 2020-02-22 PROCEDURE — 85014 HEMATOCRIT: CPT

## 2020-02-22 PROCEDURE — 84295 ASSAY OF SERUM SODIUM: CPT

## 2020-02-22 PROCEDURE — 82435 ASSAY OF BLOOD CHLORIDE: CPT

## 2020-02-22 PROCEDURE — 85610 PROTHROMBIN TIME: CPT

## 2020-02-22 PROCEDURE — 84443 ASSAY THYROID STIM HORMONE: CPT

## 2020-02-22 PROCEDURE — 82803 BLOOD GASES ANY COMBINATION: CPT

## 2020-02-22 PROCEDURE — 87040 BLOOD CULTURE FOR BACTERIA: CPT

## 2020-02-22 PROCEDURE — 93010 ELECTROCARDIOGRAM REPORT: CPT

## 2020-02-22 PROCEDURE — 85027 COMPLETE CBC AUTOMATED: CPT

## 2020-02-22 PROCEDURE — 93005 ELECTROCARDIOGRAM TRACING: CPT

## 2020-02-22 PROCEDURE — 85730 THROMBOPLASTIN TIME PARTIAL: CPT

## 2020-02-22 PROCEDURE — 82947 ASSAY GLUCOSE BLOOD QUANT: CPT

## 2020-02-22 PROCEDURE — 81003 URINALYSIS AUTO W/O SCOPE: CPT

## 2020-02-22 RX ORDER — IBUPROFEN 200 MG
600 TABLET ORAL ONCE
Refills: 0 | Status: COMPLETED | OUTPATIENT
Start: 2020-02-22 | End: 2020-02-22

## 2020-02-22 RX ORDER — ACETAMINOPHEN 500 MG
650 TABLET ORAL ONCE
Refills: 0 | Status: COMPLETED | OUTPATIENT
Start: 2020-02-22 | End: 2020-02-22

## 2020-02-22 RX ADMIN — Medication 650 MILLIGRAM(S): at 03:44

## 2020-02-22 RX ADMIN — Medication 600 MILLIGRAM(S): at 03:43

## 2020-02-22 NOTE — ED PROVIDER NOTE - PHYSICAL EXAMINATION
Gen: WDWN, NAD  HEENT: EOMI, no nasal discharge, mucous membranes moist  CV: RRR, no M/R/G  Resp: CTAB, no W/R/R  GI: Abdomen soft non-distended, NTTP, mild suprapubic TTP   MSK: No open wounds, no bruising, no LE edema  Neuro: A&Ox4, following commands, moving all four extremities spontaneously  Psych: appropriate mood, denies AH, VH, SI  : no blood or discharge visualized w/i vaginal vault, cervical os closed, no CMT, no adnexal tenderness

## 2020-02-22 NOTE — ED PROVIDER NOTE - NS ED ROS FT
Gen: Denies fever, weight loss  CV: Denies chest pain, palpitations  Skin: Denies rash, erythema, color changes  Resp: Denies SOB, cough  Endo: Denies sensitivity to heat, cold, increased urination  GI: Denies constipation, + nausea + abd pain  Msk: Denies back pain, LE swelling, extremity pain  : Denies dysuria, increased frequency  Neuro: Denies LOC, + weakness + HA  Psych: Denies hx of psych, hallucinations

## 2020-02-22 NOTE — ED PROVIDER NOTE - PROGRESS NOTE DETAILS
darlenedelmi: spoke to pt further. she endorses worsened pains especially in epigastrium in am when she skips breakfast to go to work. Discussed responsible diet and mealtimes with patient.  Pt was re-evaluated at bedside, VSS, feeling better overall. Results were discussed with patient as well as return precautions and follow up plan with PCP and/or specialist. Time was taken to answer any questions that the patient had before providing them with discharge paperwork.

## 2020-02-22 NOTE — ED PROVIDER NOTE - OBJECTIVE STATEMENT
34F w/ PMH hypothyroid, recent admission for septic shock 2/2 ecoli bacteremia & pyelonephritis requiring intubation p/w several day hx of worsening generalized headaches, non-focal weakness, + nausea w/o vomiting and tenesmus w/o diarrhea. Pt also endorsing some lower abdominal tenderness above her bladder w/ increased frequency w/o flank pain, hematuria, dysuria. LMP ended feb 2nd, pt states she stopped birth control last month.

## 2020-02-22 NOTE — ED PROVIDER NOTE - ATTENDING CONTRIBUTION TO CARE
Patient presenting complaining of dysuria, malaise, weakness for past 3 days.  No noted fevers, no back pains.  Associated nausea.  Reporting feels similar to onset of bacteremia from E coli she had a few months ago.    Exam:  General: Patient well appearing, vital signs within normal limits  HEENT: airway patent with moist mucous membranes  Cardiac: RRR S1/S2 with strong peripheral pulses  Respiratory: lungs clear without respiratory distress  GI: abdomen soft, slight suprapubic tenderness without rebound or guarding, non distended  Neuro: no gross neurologic deficits  Skin: warm, well perfused  Psych: normal mood and affect    Patient presenting with urinary symptoms, generalized malaise, normal vital signs, largely unremarkable exam.  Will obtain screening labs/UA given recent significant bacteremia, however clinically does not appear septic.

## 2020-02-22 NOTE — ED PROVIDER NOTE - NSFOLLOWUPINSTRUCTIONS_ED_ALL_ED_FT
Please follow up with your primary care provider for further concerns you may have regarding your general health. Attached you will find your results from today's visit. Continue taking your medications as prescribed and keep your upcoming medical appointments.    Read the attached handout on generalized weakness and diet and try to keep your meals to the same time daily.

## 2020-02-22 NOTE — ED PROVIDER NOTE - CLINICAL SUMMARY MEDICAL DECISION MAKING FREE TEXT BOX
34F w/ 3 day hx generalized weakness, some headaches, vague abdominal pains w/ some mild suprapubic tenderness on exam otherwise nl. labs, ucg, ua, fluids, analgesia, reassess.

## 2020-02-22 NOTE — ED ADULT NURSE NOTE - OBJECTIVE STATEMENT
35 yo Citizen of [place] speaking female AAOx3 with hx of ICU admission at [facility] for pyelonephritis in December 2019 presents to ED for dizziness, generalized weakness and abdominal pain. As per  at bedside who is able to translate for pt, pt developed severe headache and dizziness x 3days, took Excedrin with little relief of pain. C/o "weird feeling in belly", abdomen soft, tender to touch in RLQ and non distended. No burning with urination or blood in urine or stool. +nausea but no vomiting, no fevers or chills at home. Denies CP, SOB, numbness and tingling. Safety measures maintained with bed in low position and  at bedside.

## 2020-02-22 NOTE — ED PROVIDER NOTE - PATIENT PORTAL LINK FT
You can access the FollowMyHealth Patient Portal offered by [facility] by registering at the following website: [email or web address]. By joining [facility]’s FollowMyHealth portal, you will also be able to view your health information using other applications (apps) compatible with our system.

## 2020-02-23 LAB
CULTURE RESULTS: SIGNIFICANT CHANGE UP
SPECIMEN SOURCE: SIGNIFICANT CHANGE UP

## 2020-03-03 ENCOUNTER — OUTPATIENT (OUTPATIENT)
Dept: OUTPATIENT SERVICES | Facility: HOSPITAL | Age: 35
LOS: 1 days | End: 2020-03-03
Payer: SELF-PAY

## 2020-03-03 ENCOUNTER — APPOINTMENT (OUTPATIENT)
Dept: GASTROENTEROLOGY | Facility: HOSPITAL | Age: 35
End: 2020-03-03

## 2020-03-03 VITALS
HEART RATE: 76 BPM | RESPIRATION RATE: 16 BRPM | HEIGHT: 62 IN | DIASTOLIC BLOOD PRESSURE: 78 MMHG | WEIGHT: 176 LBS | SYSTOLIC BLOOD PRESSURE: 112 MMHG | BODY MASS INDEX: 32.39 KG/M2

## 2020-03-03 DIAGNOSIS — R10.9 UNSPECIFIED ABDOMINAL PAIN: ICD-10-CM

## 2020-03-03 DIAGNOSIS — K21.9 GASTRO-ESOPHAGEAL REFLUX DISEASE WITHOUT ESOPHAGITIS: ICD-10-CM

## 2020-03-03 DIAGNOSIS — R09.89 OTHER SPECIFIED SYMPTOMS AND SIGNS INVOLVING THE CIRCULATORY AND RESPIRATORY SYSTEMS: ICD-10-CM

## 2020-03-03 PROCEDURE — G0463: CPT

## 2020-03-03 NOTE — HISTORY OF PRESENT ILLNESS
[Diarrhea] : denies diarrhea [Constipation] : denies constipation [Vomiting] : denies vomiting [Wt Gain ___ Lbs] : recent [unfilled] ~Upound(s) weight gain [Abdominal Swelling] : denies abdominal swelling [Abdominal Pain] : denies abdominal pain [Yellow Skin Or Eyes (Jaundice)] : denies jaundice [Heartburn] : heartburn [Nausea] : nausea [Good Compliance] : good compliance with treatment [GERD] : gastroesophageal reflux disease [de-identified] : 34 year old woman with GERD, hypothyroidism, obesity, and recent history of septic shock from pyelonephritis (12/2019) referred to clinic for dysphagia by Cardiologist. \par \par [place]  ID #[id number]\par \par Patient states she was referred for acid reflux. She went to ED a few months ago for severe throat pain and abnormal thyroid tests. Subsequent US neck which showed normal thyroid. She states she took omeprazole for 2 months and stopped 1 month ago (prescription was sent in October). She reports significant improvement in her symptoms, as well as with dietary modifications. Today, she describes her pain as burning in quality which started in the abdomen and travelling up to chest. Symptoms are frequently brought on by condiments. No recent travel outside [place]. She admits to using ibuprofen. \par \par No pain or difficulty with swallowing however   reports patient has felt as though a fishbone was stuck in her throat previously which patient confirmed. Patient states she experienced this sensation when she initially presented to the ED which is why US neck was performed; however, it has been resolved for some time. Her  also reports ENT evaluation with laryngoscopy a month ago that was normal. \par \par

## 2020-03-03 NOTE — ASSESSMENT
[FreeTextEntry1] : Impression:\par \par 1. Heartburn: Suspect GERD vs. PUD. Controlled after 8 week course of omeprazole which patient has since self-discontinued.  No alarm symptoms such as weight loss, blood in stools, or vomiting. \par 2. Globus sensation: Laryngoscopy by ENT negative. No goiter/thyroid mass on US neck. No odynophagia or dysphagia. \par \par Plan\par - continue to hold omeprazole\par - schedule upper endoscopy today for complete examination of esophagus for globus sensation and continued pain with , and possibly biopsies to rule out H. pylori; risks/benefits explained, patient is agreeable\par - continue lifestyle modifications - avoidance of spicy foods and eating 3 hours prior to sleep\par - counseled on moderate use of ibuprofen and alternatives such as Tylenol\par \par D/w Dr. Sandoval\par NILESH Sarah PGY4

## 2020-03-03 NOTE — PHYSICAL EXAM
[General Appearance - Alert] : alert [General Appearance - Well Developed] : well developed [Sclera] : the sclera and conjunctiva were normal [Neck Cervical Mass (___cm)] : no neck mass was observed [Oropharynx] : the oropharynx was normal [Neck Appearance] : the appearance of the neck was normal [Respiration, Rhythm And Depth] : normal respiratory rhythm and effort [Heart Rate And Rhythm] : heart rate was normal and rhythm regular [Abdomen Soft] : soft [Edema] : there was no peripheral edema [Abdomen Tenderness] : non-tender [Skin Color & Pigmentation] : normal skin color and pigmentation [Musculoskeletal - Swelling] : no joint swelling seen [Oriented To Time, Place, And Person] : oriented to person, place, and time [No Focal Deficits] : no focal deficits [FreeTextEntry1] : Deferred

## 2020-03-05 PROBLEM — R79.89 TSH ELEVATION: Status: ACTIVE | Noted: 2020-03-05

## 2020-03-05 NOTE — ASSESSMENT
[FreeTextEntry1] : 34 year old female with GERD presents for initial visit for hypothyroidism.\par \par Noted with reported abnormal TSH in past during hospitalization. Last TSH in OCt 2019 was [facility]. TPo ab negative. Thyroid US WNL. Unlikely dysphagia related to thyroid.\par Would f/u for PMD for further w/u dysphagia. consider GI w/u\par For completion, repeat TFTs again today.\par \par D/W Dr. German\par \par D/U prn

## 2020-03-05 NOTE — PHYSICAL EXAM
[Alert] : alert [No Acute Distress] : no acute distress [Well Nourished] : well nourished [Well Developed] : well developed [Normal Sclera/Conjunctiva] : normal sclera/conjunctiva [PERRL] : pupils equal, round and reactive to light [EOMI] : extra ocular movement intact [No Proptosis] : no proptosis [Normal Oropharynx] : the oropharynx was normal [No Neck Mass] : no neck mass was observed [Supple] : the neck was supple [No LAD] : no lymphadenopathy [Thyroid Not Enlarged] : the thyroid was not enlarged [No Respiratory Distress] : no respiratory distress [Normal Rate and Effort] : normal respiratory rhythm and effort [No Accessory Muscle Use] : no accessory muscle use [Clear to Auscultation] : lungs were clear to auscultation bilaterally [Normal Rate] : heart rate was normal  [Normal S1, S2] : normal S1 and S2 [No Edema] : there was no peripheral edema [Normal Bowel Sounds] : normal bowel sounds [Not Tender] : non-tender [Soft] : abdomen soft [Normal] : normal and non tender [No Stigmata of Cushings Syndrome] : no stigmata of cushings syndrome [Normal Gait] : normal gait [No Joint Swelling] : no joint swelling seen [No Clubbing, Cyanosis] : no clubbing  or cyanosis of the fingernails [Normal Strength/Tone] : muscle strength and tone were normal [No Rash] : no rash [Cranial Nerves Intact] : cranial nerves 2-12 were intact [No Motor Deficits] : the motor exam was normal [No Tremors] : no tremors [Oriented x3] : oriented to person, place, and time [Normal Insight/Judgement] : insight and judgment were intact [Normal Affect] : the affect was normal [Normal Mood] : the mood was normal

## 2020-03-05 NOTE — HISTORY OF PRESENT ILLNESS
[FreeTextEntry1] : 34 year old female with GERD presents for initial visit for hypothyroidism.\par \par PI: [id number]\par \par Recent hospital admission for pyelonephritis c/b septic shock and E coli bacteremia in Dec 2019. At that time reports TFTs were checked and was told was hypothyroid. TSh noted at that time 1.7? Ft4 1.0. Does have a TSh of 4.43 in Aug 2019. TPO ab noted to be negative. Evaluated by medicine and sent for US thyroid in Oct 2019 which showed normal thyroid US.\par \par Patient never been on thyroid replacement. No hx of neck srugery or RT. no hx of amio or lithium use. \par Denies symptopms of cold intolerance, constipation, hair/skin changes. Has regular menstrual periods. Does report feeling off food getting stuck at times. has acid reflux intermittently.\par No family hx of thyroid issues.\par \par

## 2020-04-07 ENCOUNTER — APPOINTMENT (OUTPATIENT)
Dept: INTERNAL MEDICINE | Facility: CLINIC | Age: 35
End: 2020-04-07

## 2020-04-07 ENCOUNTER — OUTPATIENT (OUTPATIENT)
Dept: OUTPATIENT SERVICES | Facility: HOSPITAL | Age: 35
LOS: 1 days | End: 2020-04-07
Payer: MEDICAID

## 2020-04-07 DIAGNOSIS — Z20.828 CONTACT WITH AND (SUSPECTED) EXPOSURE TO OTHER VIRAL COMMUNICABLE DISEASES: ICD-10-CM

## 2020-04-07 PROCEDURE — G0463: CPT

## 2020-04-08 ENCOUNTER — APPOINTMENT (OUTPATIENT)
Dept: INTERNAL MEDICINE | Facility: CLINIC | Age: 35
End: 2020-04-08

## 2020-04-08 DIAGNOSIS — I10 ESSENTIAL (PRIMARY) HYPERTENSION: ICD-10-CM

## 2020-04-08 DIAGNOSIS — R09.89 OTHER SPECIFIED SYMPTOMS AND SIGNS INVOLVING THE CIRCULATORY AND RESPIRATORY SYSTEMS: ICD-10-CM

## 2020-04-13 ENCOUNTER — TRANSCRIPTION ENCOUNTER (OUTPATIENT)
Age: 35
End: 2020-04-13

## 2020-04-13 NOTE — PLAN
[FreeTextEntry1] : Spoke with pt's . He states that pt went to the [facility] ED to get COVID testing today. Per pt's  they were not planning on admitting pt. She had a Tmax of 101 this AM (but had recently had hot tea to drink). She continues to have low energy and a headache, which is unchanged from prior. She has a mild nocturnal cough. Denies shortness of breath, nausea, vomiting, diarrhea, anosmia, eye pain, confusion or any other symptoms. She continues to take Tylenol for symptom control. Endorses good PO intake and hydration. Advised to continue PO hydration and to call with any worsening of symptoms.

## 2020-04-13 NOTE — HISTORY OF PRESENT ILLNESS
[Verbal consent obtained from patient] : the patient, [unfilled] [FreeTextEntry1] : Spoke with pt's . He states that pt went to the [facility] ED to get COVID testing today. Per pt's  they were not planning on admitting pt. She had a Tmax of 101 this AM (but had recently had hot tea to drink). She continues to have low energy and a headache, which is unchanged from prior. She has a mild nocturnal cough. Denies shortness of breath, nausea, vomiting, diarrhea, anosmia, eye pain, confusion or any other symptoms. She continues to take Tylenol for symptom control. Endorses good PO intake and hydration. Advised to continue PO hydration and to call with any worsening of symptoms.  [Yes] : yes [No] : no difficulty breathing [None] : none [Stable] : stable [Phone/Virtual visit schedule to occur within 24 hours] : Phone/virtual visit schedule to occur within 24 hours [TextBox_13] : 101 [Time Spent: ___ minutes] : I have spent [unfilled] minutes with the patient on the telephone

## 2020-04-20 ENCOUNTER — APPOINTMENT (OUTPATIENT)
Dept: INTERNAL MEDICINE | Facility: CLINIC | Age: 35
End: 2020-04-20

## 2020-04-20 ENCOUNTER — OUTPATIENT (OUTPATIENT)
Dept: OUTPATIENT SERVICES | Facility: HOSPITAL | Age: 35
LOS: 1 days | End: 2020-04-20
Payer: SELF-PAY

## 2020-04-20 PROCEDURE — G0463: CPT

## 2020-04-21 DIAGNOSIS — I10 ESSENTIAL (PRIMARY) HYPERTENSION: ICD-10-CM

## 2020-04-24 ENCOUNTER — OUTPATIENT (OUTPATIENT)
Dept: OUTPATIENT SERVICES | Facility: HOSPITAL | Age: 35
LOS: 1 days | End: 2020-04-24
Payer: SELF-PAY

## 2020-04-24 ENCOUNTER — APPOINTMENT (OUTPATIENT)
Dept: INTERNAL MEDICINE | Facility: CLINIC | Age: 35
End: 2020-04-24

## 2020-04-24 ENCOUNTER — EMERGENCY (EMERGENCY)
Facility: HOSPITAL | Age: 35
LOS: 1 days | Discharge: HOME | End: 2020-04-24
Attending: STUDENT IN AN ORGANIZED HEALTH CARE EDUCATION/TRAINING PROGRAM
Payer: MEDICAID

## 2020-04-24 VITALS
SYSTOLIC BLOOD PRESSURE: 129 MMHG | HEART RATE: 99 BPM | DIASTOLIC BLOOD PRESSURE: 86 MMHG | RESPIRATION RATE: 16 BRPM | TEMPERATURE: 99 F | HEIGHT: 62 IN

## 2020-04-24 VITALS
OXYGEN SATURATION: 100 % | DIASTOLIC BLOOD PRESSURE: 78 MMHG | HEART RATE: 93 BPM | SYSTOLIC BLOOD PRESSURE: 117 MMHG | TEMPERATURE: 100 F | RESPIRATION RATE: 16 BRPM

## 2020-04-24 DIAGNOSIS — I10 ESSENTIAL (PRIMARY) HYPERTENSION: ICD-10-CM

## 2020-04-24 DIAGNOSIS — R07.9 CHEST PAIN, UNSPECIFIED: ICD-10-CM

## 2020-04-24 LAB
ALBUMIN SERPL ELPH-MCNC: 4.5 G/DL — SIGNIFICANT CHANGE UP (ref 3.3–5)
ALP SERPL-CCNC: 81 U/L — SIGNIFICANT CHANGE UP (ref 40–120)
ALT FLD-CCNC: 57 U/L — HIGH (ref 10–45)
ANION GAP SERPL CALC-SCNC: 15 MMOL/L — SIGNIFICANT CHANGE UP (ref 5–17)
APPEARANCE UR: CLEAR — SIGNIFICANT CHANGE UP
AST SERPL-CCNC: 24 U/L — SIGNIFICANT CHANGE UP (ref 10–40)
BILIRUB SERPL-MCNC: 0.3 MG/DL — SIGNIFICANT CHANGE UP (ref 0.2–1.2)
BILIRUB UR-MCNC: NEGATIVE — SIGNIFICANT CHANGE UP
BUN SERPL-MCNC: 6 MG/DL — LOW (ref 7–23)
CALCIUM SERPL-MCNC: 9.9 MG/DL — SIGNIFICANT CHANGE UP (ref 8.4–10.5)
CHLORIDE SERPL-SCNC: 102 MMOL/L — SIGNIFICANT CHANGE UP (ref 96–108)
CO2 SERPL-SCNC: 22 MMOL/L — SIGNIFICANT CHANGE UP (ref 22–31)
COLOR SPEC: COLORLESS — SIGNIFICANT CHANGE UP
CREAT SERPL-MCNC: 0.55 MG/DL — SIGNIFICANT CHANGE UP (ref 0.5–1.3)
D DIMER BLD IA.RAPID-MCNC: <150 NG/ML DDU — SIGNIFICANT CHANGE UP
DIFF PNL FLD: NEGATIVE — SIGNIFICANT CHANGE UP
GLUCOSE SERPL-MCNC: 99 MG/DL — SIGNIFICANT CHANGE UP (ref 70–99)
GLUCOSE UR QL: NEGATIVE — SIGNIFICANT CHANGE UP
HCG UR QL: NEGATIVE — SIGNIFICANT CHANGE UP
HCT VFR BLD CALC: 42.6 % — SIGNIFICANT CHANGE UP (ref 34.5–45)
HGB BLD-MCNC: 14.4 G/DL — SIGNIFICANT CHANGE UP (ref 11.5–15.5)
KETONES UR-MCNC: NEGATIVE — SIGNIFICANT CHANGE UP
LEUKOCYTE ESTERASE UR-ACNC: NEGATIVE — SIGNIFICANT CHANGE UP
MCHC RBC-ENTMCNC: 32.1 PG — SIGNIFICANT CHANGE UP (ref 27–34)
MCHC RBC-ENTMCNC: 33.8 GM/DL — SIGNIFICANT CHANGE UP (ref 32–36)
MCV RBC AUTO: 94.9 FL — SIGNIFICANT CHANGE UP (ref 80–100)
NITRITE UR-MCNC: NEGATIVE — SIGNIFICANT CHANGE UP
NRBC # BLD: 0 /100 WBCS — SIGNIFICANT CHANGE UP (ref 0–0)
PH UR: 6 — SIGNIFICANT CHANGE UP (ref 5–8)
PLATELET # BLD AUTO: 195 K/UL — SIGNIFICANT CHANGE UP (ref 150–400)
POTASSIUM SERPL-MCNC: 4.3 MMOL/L — SIGNIFICANT CHANGE UP (ref 3.5–5.3)
POTASSIUM SERPL-SCNC: 4.3 MMOL/L — SIGNIFICANT CHANGE UP (ref 3.5–5.3)
PROT SERPL-MCNC: 7.4 G/DL — SIGNIFICANT CHANGE UP (ref 6–8.3)
PROT UR-MCNC: NEGATIVE — SIGNIFICANT CHANGE UP
RBC # BLD: 4.49 M/UL — SIGNIFICANT CHANGE UP (ref 3.8–5.2)
RBC # FLD: 12.8 % — SIGNIFICANT CHANGE UP (ref 10.3–14.5)
SODIUM SERPL-SCNC: 139 MMOL/L — SIGNIFICANT CHANGE UP (ref 135–145)
SP GR SPEC: 1 — LOW (ref 1.01–1.02)
UROBILINOGEN FLD QL: NEGATIVE — SIGNIFICANT CHANGE UP
WBC # BLD: 7.16 K/UL — SIGNIFICANT CHANGE UP (ref 3.8–10.5)
WBC # FLD AUTO: 7.16 K/UL — SIGNIFICANT CHANGE UP (ref 3.8–10.5)

## 2020-04-24 PROCEDURE — 81025 URINE PREGNANCY TEST: CPT

## 2020-04-24 PROCEDURE — 99283 EMERGENCY DEPT VISIT LOW MDM: CPT

## 2020-04-24 PROCEDURE — 80053 COMPREHEN METABOLIC PANEL: CPT

## 2020-04-24 PROCEDURE — 85027 COMPLETE CBC AUTOMATED: CPT

## 2020-04-24 PROCEDURE — G0463: CPT

## 2020-04-24 PROCEDURE — 85379 FIBRIN DEGRADATION QUANT: CPT

## 2020-04-24 PROCEDURE — 99284 EMERGENCY DEPT VISIT MOD MDM: CPT

## 2020-04-24 PROCEDURE — 81003 URINALYSIS AUTO W/O SCOPE: CPT

## 2020-04-24 RX ORDER — SODIUM CHLORIDE 9 MG/ML
1000 INJECTION INTRAMUSCULAR; INTRAVENOUS; SUBCUTANEOUS ONCE
Refills: 0 | Status: COMPLETED | OUTPATIENT
Start: 2020-04-24 | End: 2020-04-24

## 2020-04-24 RX ADMIN — SODIUM CHLORIDE 1000 MILLILITER(S): 9 INJECTION INTRAMUSCULAR; INTRAVENOUS; SUBCUTANEOUS at 15:05

## 2020-04-24 NOTE — ED PROVIDER NOTE - ATTENDING CONTRIBUTION TO CARE
34 F w/ pmh of GERD presents to the ED w/ 1 month of weakness. She reports that she has been to [facility] two times, and then today went to see PCP who referred her to the ED for an eval for a pulmonary embolism. Pt has no shortness of breath, no chest pain. She has lightheadedness w/ ambulation beyond 30 minutes, she has no hx of depression but describes her mood as ok. she has no SI/HI. She has paperwork from [facility] that details a normal intracranial carotid system, no mass in the brain, intact calvarium. ECHO w/ no structural abnormality, EF is 55-60 on studies done on 4/18/2020. Pt is normotensive, ambulatory in the department w/out difficulty. She has no chest pain, She has clear lungs, no lower extremity edema, no abdominal tenderness. Plan for labs, and reassessment.

## 2020-04-24 NOTE — ED PROVIDER NOTE - PATIENT PORTAL LINK FT
You can access the FollowMyHealth Patient Portal offered by [facility] by registering at the following website: [email or web address]. By joining [employer]’s FollowMyHealth portal, you will also be able to view your health information using other applications (apps) compatible with our system.

## 2020-04-24 NOTE — ED PROVIDER NOTE - NSFOLLOWUPINSTRUCTIONS_ED_ALL_ED_FT
You were seen in the emergency department today and we recommend that you return if you develop chest pain, abdominal pain, shortness of breath, lightheadedness, vomiting, leg swelling, fever, headache, slurred speech, weakness or blurry vision.     Please follow up with your primary care doctor over the next 2-3 days for further management of your symptoms and to review your bloodwork to ensure no additional tests, imaging or bloodwork, need to be performed.

## 2020-04-24 NOTE — ED CLERICAL - NS ED CLERK NOTE PRE-ARRIVAL INFORMATION; ADDITIONAL PRE-ARRIVAL INFORMATION
CC/Reason For referral: c/o weakness , dizziness and chest discomfort. Seen last week at [facility] and sent home. covid status unknown.  Sent to r/o PE  Preferred Consultant(if applicable): na  Who admits for you (if needed): na  Do you have documents you would like to fax over? no  Would you still like to speak to an ED attending? call if needed

## 2020-04-24 NOTE — ED PROVIDER NOTE - PHYSICAL EXAMINATION
GENERAL: no acute distress, non-toxic appearing  HEENT: normal conjunctiva, oral mucosa moist  CARDIAC: regular rate high 90s and regular rhythm, normal S1 and S2, no appreciable murmurs  PULM: clear to ascultation bilaterally, no crackles, rales, rhonchi, or wheezing  GI: abdomen nondistended, soft, nontender  : no CVA tenderness, no suprapubic tenderness  NEURO: alert and oriented x 3, normal speech, PERRL, EOMI, CN3-12 intact, no focal motor or sensory deficits, no pronator drift, finger to nose intact  MSK: no visible deformities, no peripheral edema, calf tenderness/redness/swelling  SKIN: no visible rashes, dry, well-perfused  PSYCH: appropriate mood and affect

## 2020-04-24 NOTE — ED ADULT TRIAGE NOTE - CHIEF COMPLAINT QUOTE
patient used hand  1 month ago on 3/24 and felt her hands get burned and became dizzy from the smell.   was tested for covid 2 weeks ago, negative   right sided cp and sore throat since the hand

## 2020-04-24 NOTE — ED ADULT NURSE NOTE - OBJECTIVE STATEMENT
pt is a 34 yr F presents with dizziness/fatigue since 3/24 after using a friend's hand  that made both her hands numb. pt reports that since using she is extremely fatigued and unable to do her normal activities without feeling dizzy and tired, reports palpitations when laying on her side, reports diarrhea and nausea, reports her LMP was 3/11 with negative preg test and that she has been having "clear jelly" discharge, pt also reports intermittent fevers without chills. denies cough/sob/abd pain/foul smelling discharge/painful urination or hematuria. abd soft, non-tender. pt neuro intact without any signs of distress. pt was seen at [facility] on 4/13 with extensive workup.

## 2020-04-24 NOTE — ED PROVIDER NOTE - CLINICAL SUMMARY MEDICAL DECISION MAKING FREE TEXT BOX
34F p/w lightheadedness/dizziness/fatigue/intermittent fevers/diarrhea for past month, workup done at [facility] without any obvious cause for pts sxs. Sent in from PCP with concern of PE. Vitals reassuring. Exam unremarkable. Given symptomatology, possibly infx related. Will do basics, urine, dimer, consider CTA.

## 2020-04-24 NOTE — ED PROVIDER NOTE - OBJECTIVE STATEMENT
34F PMH GERD presents with lightheadedness and fatigue since March 25th which she states began right after she used hand  at work. She reports some intermittent diarrhea and fevers over the last month. She states her lightheadedness gets worse when she walks. Also states LMP March 11th and was "jelly-like". Denies any headaches, numbness/weakness/tingling in extremities, blurry/double vision. Denies chest pain, uri sxs, cough, sob, abd pain, n/v, urinary sxs. Pt denies amotivation, denies depressed mood. Non-smoker, not on ocps, no hx blood clots.  Pt seen [facility] on 4/18/20 with MR Brain, MRA, CXR, TTE, and labwork/urine/hcg/tsh/covid which don't show any obvious cause for patient's complaints.   Done with

## 2020-04-27 ENCOUNTER — APPOINTMENT (OUTPATIENT)
Dept: INTERNAL MEDICINE | Facility: CLINIC | Age: 35
End: 2020-04-27

## 2020-04-29 DIAGNOSIS — R68.89 OTHER GENERAL SYMPTOMS AND SIGNS: ICD-10-CM

## 2020-05-05 ENCOUNTER — APPOINTMENT (OUTPATIENT)
Dept: INTERNAL MEDICINE | Facility: CLINIC | Age: 35
End: 2020-05-05

## 2020-05-05 ENCOUNTER — OUTPATIENT (OUTPATIENT)
Dept: OUTPATIENT SERVICES | Facility: HOSPITAL | Age: 35
LOS: 1 days | End: 2020-05-05
Payer: SELF-PAY

## 2020-05-05 DIAGNOSIS — R42 DIZZINESS AND GIDDINESS: ICD-10-CM

## 2020-05-05 PROCEDURE — G0463: CPT

## 2020-05-06 DIAGNOSIS — I10 ESSENTIAL (PRIMARY) HYPERTENSION: ICD-10-CM

## 2020-05-08 ENCOUNTER — APPOINTMENT (OUTPATIENT)
Dept: INTERNAL MEDICINE | Facility: CLINIC | Age: 35
End: 2020-05-08

## 2020-05-19 ENCOUNTER — APPOINTMENT (OUTPATIENT)
Dept: INTERNAL MEDICINE | Facility: CLINIC | Age: 35
End: 2020-05-19

## 2020-05-19 ENCOUNTER — OUTPATIENT (OUTPATIENT)
Dept: OUTPATIENT SERVICES | Facility: HOSPITAL | Age: 35
LOS: 1 days | End: 2020-05-19
Payer: SELF-PAY

## 2020-05-19 DIAGNOSIS — H53.8 OTHER VISUAL DISTURBANCES: ICD-10-CM

## 2020-05-19 DIAGNOSIS — R42 DIZZINESS AND GIDDINESS: ICD-10-CM

## 2020-05-19 DIAGNOSIS — I10 ESSENTIAL (PRIMARY) HYPERTENSION: ICD-10-CM

## 2020-05-19 PROCEDURE — G0463: CPT

## 2020-05-21 ENCOUNTER — APPOINTMENT (OUTPATIENT)
Dept: NEUROLOGY | Facility: CLINIC | Age: 35
End: 2020-05-21

## 2020-05-28 ENCOUNTER — EMERGENCY (EMERGENCY)
Facility: HOSPITAL | Age: 35
LOS: 1 days | Discharge: HOME | End: 2020-05-28
Attending: EMERGENCY MEDICINE
Payer: MEDICAID

## 2020-05-28 VITALS
SYSTOLIC BLOOD PRESSURE: 118 MMHG | WEIGHT: 154.98 LBS | DIASTOLIC BLOOD PRESSURE: 80 MMHG | OXYGEN SATURATION: 98 % | RESPIRATION RATE: 20 BRPM | HEART RATE: 100 BPM | TEMPERATURE: 98 F | HEIGHT: 62 IN

## 2020-05-28 VITALS
DIASTOLIC BLOOD PRESSURE: 72 MMHG | SYSTOLIC BLOOD PRESSURE: 110 MMHG | OXYGEN SATURATION: 99 % | TEMPERATURE: 98 F | HEART RATE: 79 BPM | RESPIRATION RATE: 20 BRPM

## 2020-05-28 LAB
ALBUMIN SERPL ELPH-MCNC: 4.5 G/DL — SIGNIFICANT CHANGE UP (ref 3.3–5)
ALP SERPL-CCNC: 78 U/L — SIGNIFICANT CHANGE UP (ref 40–120)
ALT FLD-CCNC: 42 U/L — SIGNIFICANT CHANGE UP (ref 10–45)
ANION GAP SERPL CALC-SCNC: 14 MMOL/L — SIGNIFICANT CHANGE UP (ref 5–17)
AST SERPL-CCNC: 23 U/L — SIGNIFICANT CHANGE UP (ref 10–40)
BASOPHILS # BLD AUTO: 0.05 K/UL — SIGNIFICANT CHANGE UP (ref 0–0.2)
BASOPHILS NFR BLD AUTO: 0.6 % — SIGNIFICANT CHANGE UP (ref 0–2)
BILIRUB SERPL-MCNC: 0.2 MG/DL — SIGNIFICANT CHANGE UP (ref 0.2–1.2)
BUN SERPL-MCNC: 9 MG/DL — SIGNIFICANT CHANGE UP (ref 7–23)
CALCIUM SERPL-MCNC: 10.2 MG/DL — SIGNIFICANT CHANGE UP (ref 8.4–10.5)
CHLORIDE SERPL-SCNC: 102 MMOL/L — SIGNIFICANT CHANGE UP (ref 96–108)
CO2 SERPL-SCNC: 24 MMOL/L — SIGNIFICANT CHANGE UP (ref 22–31)
CREAT SERPL-MCNC: 0.63 MG/DL — SIGNIFICANT CHANGE UP (ref 0.5–1.3)
EOSINOPHIL # BLD AUTO: 0.12 K/UL — SIGNIFICANT CHANGE UP (ref 0–0.5)
EOSINOPHIL NFR BLD AUTO: 1.3 % — SIGNIFICANT CHANGE UP (ref 0–6)
GLUCOSE SERPL-MCNC: 131 MG/DL — HIGH (ref 70–99)
HCG SERPL-ACNC: <2 MIU/ML — SIGNIFICANT CHANGE UP
HCT VFR BLD CALC: 39.6 % — SIGNIFICANT CHANGE UP (ref 34.5–45)
HGB BLD-MCNC: 13.7 G/DL — SIGNIFICANT CHANGE UP (ref 11.5–15.5)
IMM GRANULOCYTES NFR BLD AUTO: 0.3 % — SIGNIFICANT CHANGE UP (ref 0–1.5)
LYMPHOCYTES # BLD AUTO: 3.3 K/UL — SIGNIFICANT CHANGE UP (ref 1–3.3)
LYMPHOCYTES # BLD AUTO: 36.8 % — SIGNIFICANT CHANGE UP (ref 13–44)
MCHC RBC-ENTMCNC: 33.6 PG — SIGNIFICANT CHANGE UP (ref 27–34)
MCHC RBC-ENTMCNC: 34.6 GM/DL — SIGNIFICANT CHANGE UP (ref 32–36)
MCV RBC AUTO: 97.1 FL — SIGNIFICANT CHANGE UP (ref 80–100)
MONOCYTES # BLD AUTO: 0.54 K/UL — SIGNIFICANT CHANGE UP (ref 0–0.9)
MONOCYTES NFR BLD AUTO: 6 % — SIGNIFICANT CHANGE UP (ref 2–14)
NEUTROPHILS # BLD AUTO: 4.93 K/UL — SIGNIFICANT CHANGE UP (ref 1.8–7.4)
NEUTROPHILS NFR BLD AUTO: 55 % — SIGNIFICANT CHANGE UP (ref 43–77)
NRBC # BLD: 0 /100 WBCS — SIGNIFICANT CHANGE UP (ref 0–0)
PLATELET # BLD AUTO: 206 K/UL — SIGNIFICANT CHANGE UP (ref 150–400)
POTASSIUM SERPL-MCNC: 3.5 MMOL/L — SIGNIFICANT CHANGE UP (ref 3.5–5.3)
POTASSIUM SERPL-SCNC: 3.5 MMOL/L — SIGNIFICANT CHANGE UP (ref 3.5–5.3)
PROT SERPL-MCNC: 7 G/DL — SIGNIFICANT CHANGE UP (ref 6–8.3)
RBC # BLD: 4.08 M/UL — SIGNIFICANT CHANGE UP (ref 3.8–5.2)
RBC # FLD: 12.5 % — SIGNIFICANT CHANGE UP (ref 10.3–14.5)
SODIUM SERPL-SCNC: 140 MMOL/L — SIGNIFICANT CHANGE UP (ref 135–145)
WBC # BLD: 8.97 K/UL — SIGNIFICANT CHANGE UP (ref 3.8–10.5)
WBC # FLD AUTO: 8.97 K/UL — SIGNIFICANT CHANGE UP (ref 3.8–10.5)

## 2020-05-28 PROCEDURE — 99284 EMERGENCY DEPT VISIT MOD MDM: CPT

## 2020-05-28 PROCEDURE — 93005 ELECTROCARDIOGRAM TRACING: CPT

## 2020-05-28 PROCEDURE — 71046 X-RAY EXAM CHEST 2 VIEWS: CPT

## 2020-05-28 PROCEDURE — 99284 EMERGENCY DEPT VISIT MOD MDM: CPT | Mod: 25

## 2020-05-28 PROCEDURE — 71046 X-RAY EXAM CHEST 2 VIEWS: CPT | Mod: 26

## 2020-05-28 PROCEDURE — 84702 CHORIONIC GONADOTROPIN TEST: CPT

## 2020-05-28 PROCEDURE — 80053 COMPREHEN METABOLIC PANEL: CPT

## 2020-05-28 PROCEDURE — 85027 COMPLETE CBC AUTOMATED: CPT

## 2020-05-28 PROCEDURE — 93010 ELECTROCARDIOGRAM REPORT: CPT

## 2020-05-28 NOTE — ED ADULT NURSE NOTE - OBJECTIVE STATEMENT
34y F PMHx hypothyroidism & GERD presents to ED from home with CP, blurred vision, & dizziness x2 months. A&Ox4. States that she had recent visit to [facility] & was dispo with recommendations to f/u with outpatient neurologist for possible vertigo, but pt has not seen neurologist yet. Denies oral contraceptives, SOB, H/A, N/V/D, abdominal pain, f/c, dizziness, & urinary symptoms. Lungs CTA & no respiratory distress noted on RA. Cardiac monitor applied. Abdomen soft, nondistened, & nontender to palpation. Skin warm, dry, & intact. MAEx4. No LE edema noted on exam. Pt resting comfortably with no complaints at this time. Pt's bed in the lowest position & explained POC to pt. Will continue to reassess. 34y F PMHx hypothyroidism & GERD presents to ED from home with CP, blurred vision, & dizziness x6 weeks. A&Ox4. Endorses improvement of CP & reports 2/10 CP at this time. Reports that dizziness is worse when she stays still & improves with walking. States that she had recent admission to [facility] with negative w/u & was d/c with recommendations to f/u with outpatient neurologist for possible vertigo, but pt has not seen neurologist yet. Denies improvement with Rx'd trial of meclizine. Had recent ED visit for possible PE w/u that was negative. Denies oral contraceptives, SOB, H/A, N/V/D, abdominal pain, f/c, dizziness, & urinary symptoms. PERRL intact with 20/20 vision in B/L eyes. Lungs CTA & no respiratory distress noted on RA. Cardiac monitor applied. Abdomen soft, nondistened, & nontender to palpation. Skin warm, dry, & intact. MAEx4. No LE edema noted on exam. Pt resting comfortably with no complaints at this time. Pt's bed in the lowest position & explained POC to pt. Will continue to reassess. 34y F PMHx hypothyroidism & GERD presents to ED from home with sharp CP, blurred vision, & dizziness x6 weeks. A&Ox4. Endorses improvement of CP & reports 2/10 CP at this time. Reports that dizziness is worse when she stays still & improves with walking. States that she had recent admission to [facility] with negative w/u & was d/c with recommendations to f/u with outpatient neurologist for possible vertigo, but pt has not seen neurologist yet. Denies improvement with Rx'd trial of meclizine. Reports recent [facility] ED visit for possible PE w/u that was negative. Denies oral contraceptives, SOB, H/A, N/V/D, abdominal pain, f/c, dizziness, & urinary symptoms. PERRL intact with 20/20 vision in B/L eyes. Lungs CTA & no respiratory distress noted on RA. Cardiac monitor applied. Abdomen soft, nondistened, & nontender to palpation. Skin warm, dry, & intact. MAEx4. No LE edema noted on exam. Pt resting comfortably with no complaints at this time. Pt's bed in the lowest position & explained POC to pt. Will continue to reassess.

## 2020-05-28 NOTE — ED PROVIDER NOTE - OBJECTIVE STATEMENT
35 yo f no reported pmh, pw cp, and vertigo, and vision blurriness. pt has been eval multiple times for similar complaints, had extensive workup at OSH approx 2 months prior and had negative head imaging and cardiac w/u at time. was eval approx 2 weeks later at [facility] and [place]. pt reports today had episode of left sided sharp cp, radiating up to neck, 7/10, similar to previous episodes, worse episode in weeks. denies hx cad in self/family. denies hx pe/dvt in self/family. denies n/v, sob. has been unable to fu w/ pcp/neuro.

## 2020-05-28 NOTE — ED PROVIDER NOTE - NS HIV RISK FACTOR YES
Patient: Kathy Elizondo    YOB: 1940    Date: 12/19/2018    Primary Care Provider: Rashmi Burgess APRN    Chief Complaint   Patient presents with   • Anemia       Subjective .     History of present illness:  Patient is in the office today for evaluation and consultation for anemia per primary care provider. Patient last hemoglobin was taken on 12/12/18 that showed 7.8. Patient states fatigue. She denies any blood in stool, nausea vomiting, diarrhea or constipation.     The patient denies any history of abdominal pain, she denies any history of weight loss.  She does have a history significant for shortness of breath and weakness.  She denies any history of blood per rectum or hematemesis, she's never had a previous upper or lower endoscopy.    The following portions of the patient's history were reviewed and updated as appropriate: allergies, current medications, past family history, past medical history, past social history, past surgical history and problem list.       Review of Systems   Constitutional: Positive for fatigue. Negative for chills, diaphoresis and fever.   HENT: Negative for dental problem, drooling, ear discharge and hearing loss.    Respiratory: Negative for cough, choking, chest tightness and shortness of breath.    Cardiovascular: Negative for chest pain, palpitations and leg swelling.   Gastrointestinal: Positive for blood in stool.   Endocrine: Negative for cold intolerance and heat intolerance.   Genitourinary: Negative for flank pain, frequency and hematuria.   Neurological: Negative for seizures, light-headedness, numbness and headaches.   Psychiatric/Behavioral: Negative for agitation, behavioral problems and confusion.       Allergies:  No Known Allergies    Medications:    Current Outpatient Medications:   •  ferrous sulfate 325 (65 FE) MG tablet, Take 325 mg by mouth 3 (Three) Times a Day With Meals., Disp: , Rfl:   •  losartan (COZAAR) 50 MG tablet, Take 50 mg by  "mouth Daily., Disp: , Rfl:   •  lovastatin (MEVACOR) 20 MG tablet, Take 20 mg by mouth Every Night., Disp: , Rfl:   •  omeprazole (priLOSEC) 20 MG capsule, Take 20 mg by mouth Daily., Disp: , Rfl:   •  bisacodyl (DULCOLAX) 5 MG EC tablet, As directed/colon prep, Disp: 4 tablet, Rfl: 0  •  ibuprofen (ADVIL,MOTRIN) 200 MG tablet, Take 200 mg by mouth 2 (two) times a day. 2 tablets at night, Disp: , Rfl:   •  naproxen sodium (ALEVE) 220 MG tablet, Take 220 mg by mouth 2 (two) times a day. 2 tablets in the AM, Disp: , Rfl:   •  polyethylene glycol (MIRALAX) packet, Take 255 g by mouth 1 (One) Time for 1 dose. As directed/colon prep, Disp: 15 packet, Rfl: 0    History\"  Past Medical History:   Diagnosis Date   • Hypertension    • Knee pain, left    • Stroke (CMS/HCC)    • Varicose veins        Past Surgical History:   Procedure Laterality Date   • DILATION AND CURETTAGE, DIAGNOSTIC / THERAPEUTIC     • PARTIAL HYSTERECTOMY     • VEIN LIGATION AND STRIPPING BILATERAL         Family History   Problem Relation Age of Onset   • Lung disease Mother    • Heart disease Father         CABG   • Heart disease Brother         CABG       Social History     Tobacco Use   • Smoking status: Never Smoker   • Smokeless tobacco: Never Used   Substance Use Topics   • Alcohol use: No   • Drug use: No        Objective     Vital Signs:   Vitals:    12/19/18 1608   BP: 134/62   Pulse: 86   Temp: 98.1 °F (36.7 °C)   SpO2: 98%   Weight: 61.2 kg (135 lb)   Height: 149.9 cm (59\")       Physical Exam:   General Appearance:    Alert, cooperative, in no acute distress   Head:    Normocephalic, without obvious abnormality, atraumatic   Eyes:            Lids and lashes normal, conjunctivae and sclerae normal, no   icterus, no pallor, corneas clear, PERRLA   Ears:    Ears appear intact with no abnormalities noted   Throat:   No oral lesions, no thrush, oral mucosa moist   Neck:   No adenopathy, supple, trachea midline, no thyromegaly, no   carotid bruit, " no JVD   Lungs:     Clear to auscultation,respirations regular, even and                  unlabored    Heart:    Regular rhythm and normal rate, normal S1 and S2, no            murmur, no gallop, no rub, no click   Chest Wall:    No abnormalities observed   Abdomen:     Normal bowel sounds, no masses, no organomegaly, soft        non-tender, non-distended, no guarding, no rebound                tenderness   Extremities:   Moves all extremities well, no edema, no cyanosis, no             redness   Pulses:   Pulses palpable and equal bilaterally   Skin:   No bleeding, bruising or rash   Lymph nodes:   No palpable adenopathy   Neurologic:   Cranial nerves 2 - 12 grossly intact, sensation intact, DTR       present and equal bilaterally     Results Review:   I reviewed the patient's new clinical results.  I reviewed the patient's new imaging results and agree with the interpretation.  I reviewed the patient's other test results and agree with the interpretation    Assessment/Plan     1. Iron deficiency anemia due to chronic blood loss        In short, did have a detailed and extensive discussion with the patient and her family in the office today.  I think she needs to undergo GI workup for her anemia and I would do both an upper and lower endoscopy at the same time.  Risk and benefits of operative versus nonoperative intervention of been discussed with the patient, she understands and agrees, and wishes to proceed.    I discussed the patients findings and my recommendations with patient and family.    Review of Systems was reviewed and confirmed as accurate today.    Electronically signed by Keith Negro MD  12/19/18      .             Declined

## 2020-05-28 NOTE — ED ADULT TRIAGE NOTE - CHIEF COMPLAINT QUOTE
chest pain 1 month, dizziness & blurry vision (swabbed in march for covid-negative) worsening chest pain since March 25th, dizziness & blurry vision (swabbed in march covid-negative)

## 2020-05-28 NOTE — ED PROVIDER NOTE - CLINICAL SUMMARY MEDICAL DECISION MAKING FREE TEXT BOX
Attending MD Meneses: 34F presenting with atypical chest pain and blurred vision which has been present for weeks. Nonfocal neuro exam and normal visual acuity. ECG without e/o ischemia, CXR wnl. Do not suspect ACS or PE (PERC negative). Unclear etiology of patient's symptoms, patient is currently following with neurologist as outpatient for visual complaints, instructed patient to observe this follow up.

## 2020-05-28 NOTE — ED PROVIDER NOTE - PHYSICAL EXAMINATION
General: well appearing, interactive, well nourished, no apparent distress, ncat  HEENT: EOMI, PERRLA, normal mucosa, normal oropharynx, no lesions on the lips or on oral mucosa, normal external ear, os/os 20/20  Neck: supple, no lymphadenopathy, full range of motion, no nuchal rigidity  CV: RRR, normal S1 and S2 with no murmur, capillary refill less than two seconds  Resp: lungs CTA b/l, good aeration bilaterally, symmetric chest wall   Abd: non-distended, soft, non-tender  : no CVA tenderness  MSK: full range of motion, no cyanosis, no edema, no clubbing, no immobility  Neuro: CN2-12 grossly intact. EOMI. 5/5 strength in UE and LE b/l.  Sensation intact in UE/LE b/l b/l.  No dysdiadochokinesia. Gait nl   Skin: no rashes, skin intact

## 2020-05-28 NOTE — ED PROVIDER NOTE - ATTENDING CONTRIBUTION TO CARE
Attending MD Meneses:  I personally have seen and examined this patient.  Resident note reviewed and agree on plan of care and except where noted.  See HPI, PE, and MDM for details.    **THIS PATIENT WAS EVALUATED AND TREATED DURING THE COVID-19 PANDEMIC IN [place], [place]**

## 2020-05-28 NOTE — ED PROVIDER NOTE - NS ED ROS FT
GENERAL: No fever or chills, //             EYES: no change in vision, //             HEENT: no trouble swallowing or speaking, //             CARDIAC:  chest pain, //              PULMONARY: no cough or SOB, //             GI: no abdominal pain, no nausea or no vomiting, no diarrhea or constipation, //             : No changes in urination,  //            SKIN: no rashes,  //            NEURO: vertigo  //             MSK: No joint pain otherwise as HPI or negative. ~Gee Penn DO PGY2

## 2020-05-28 NOTE — ED PROVIDER NOTE - NSFOLLOWUPINSTRUCTIONS_ED_ALL_ED_FT
1) Please follow up with your Primary Care Provider in 24-48 hours  2) Seek immediate medical care for any new or returning symptoms including but not limited severe pain, high fevers, difficulty breathing  3) Please follow up with Neurology

## 2020-06-01 ENCOUNTER — OUTPATIENT (OUTPATIENT)
Dept: OUTPATIENT SERVICES | Facility: HOSPITAL | Age: 35
LOS: 1 days | End: 2020-06-01
Payer: SELF-PAY

## 2020-06-01 ENCOUNTER — APPOINTMENT (OUTPATIENT)
Dept: INTERNAL MEDICINE | Facility: CLINIC | Age: 35
End: 2020-06-01

## 2020-06-01 VITALS
OXYGEN SATURATION: 98 % | HEIGHT: 62 IN | BODY MASS INDEX: 30.55 KG/M2 | SYSTOLIC BLOOD PRESSURE: 120 MMHG | HEART RATE: 73 BPM | DIASTOLIC BLOOD PRESSURE: 80 MMHG | WEIGHT: 166 LBS

## 2020-06-01 DIAGNOSIS — I10 ESSENTIAL (PRIMARY) HYPERTENSION: ICD-10-CM

## 2020-06-01 DIAGNOSIS — R42 DIZZINESS AND GIDDINESS: ICD-10-CM

## 2020-06-01 PROCEDURE — G0463: CPT

## 2020-06-01 NOTE — ASSESSMENT
[FreeTextEntry1] : 35 yo female with PMHx of GERD, hypothyroidism, obesity, multiple ED visits presented to [facility] ED 12/1 with abdominal pain who presents to the clinic with dizziness and blurred vision, x 10 weeks. \par \par # Vertigo- likely peripheral 2/2 BPPV vs post viral vertigo or Meiniere's\par - patient tried taking meclizine for 2 weeks, but did not alleviate her symptoms\par - neurology appt scheduled for 7/2. Will task  team to schedule for an earlier time\par - Patient had MRI done at OSH in April 2020. Advised to discuss results with neurologist\par \par - Case d/w Dr. Sultana\par - RTC in 4-5 weeks

## 2020-06-01 NOTE — REVIEW OF SYSTEMS
[Vision Problems] : vision problems [Chest Pain] : chest pain [Dizziness] : dizziness [Fever] : no fever [Pain] : no pain [Chills] : no chills [Palpitations] : no palpitations [Shortness Of Breath] : no shortness of breath [Cough] : no cough [Abdominal Pain] : no abdominal pain [Nausea] : no nausea [Vomiting] : no vomiting [Dysuria] : no dysuria [Frequency] : no frequency [Headache] : no headache [Muscle Pain] : no muscle pain [Back Pain] : no back pain

## 2020-06-01 NOTE — PHYSICAL EXAM
[No Acute Distress] : no acute distress [Well Developed] : well developed [Normal Sclera/Conjunctiva] : normal sclera/conjunctiva [EOMI] : extraocular movements intact [No Respiratory Distress] : no respiratory distress  [Clear to Auscultation] : lungs were clear to auscultation bilaterally [Normal Rate] : normal rate  [Normal S1, S2] : normal S1 and S2 [Regular Rhythm] : with a regular rhythm [Soft] : abdomen soft [No HSM] : no HSM [Non Tender] : non-tender [Coordination Grossly Intact] : coordination grossly intact [No Focal Deficits] : no focal deficits

## 2020-06-01 NOTE — HISTORY OF PRESENT ILLNESS
[[employer] ] : provided by [employer]   [FreeTextEntry1] : [id number] [TWNoteComboBox1] : Romanian [de-identified] : 33 yo female with PMHx of GERD, hypothyroidism, obesity, multiple ED visits presented to [facility] ED 12/1 with abdominal pain who presents to the clinic with dizziness and blurred vision, x 10 weeks. She also endorses sternal chest pain that started 7 weeks ago. On Friday, she went to the ED and her w/u was negative. On March 25th, she was having dizziness and had intermittent blurred vision. Patient had extensive cardiac and neuro w/u that was negative. She feels that her "head is shaky" and it worsens with positional changes. No fall, tinnitus, hearing loss, headache, f/c/n/v, myalgias, cough, weakness, and SOB. She suspected that she has covid. She went to ED and got tested for covid19, which was negative. Patient is currently only taking multivitamins. She was on meclizine for vertigo for two weeks, which provided minimal relief.

## 2020-06-01 NOTE — END OF VISIT
[Time Spent: ___ minutes] : I have spent [unfilled] minutes of time on the encounter. [] : Resident [FreeTextEntry3] : Would like to refer for vestibular therapy but will hold off until evaluated by neurology.

## 2020-06-08 ENCOUNTER — EMERGENCY (EMERGENCY)
Facility: HOSPITAL | Age: 35
LOS: 1 days | Discharge: HOME | End: 2020-06-08
Attending: EMERGENCY MEDICINE
Payer: MEDICAID

## 2020-06-08 VITALS
WEIGHT: 169.98 LBS | TEMPERATURE: 98 F | HEIGHT: 62 IN | SYSTOLIC BLOOD PRESSURE: 115 MMHG | HEART RATE: 70 BPM | OXYGEN SATURATION: 100 % | DIASTOLIC BLOOD PRESSURE: 79 MMHG | RESPIRATION RATE: 18 BRPM

## 2020-06-08 LAB
ALBUMIN SERPL ELPH-MCNC: 4.4 G/DL — SIGNIFICANT CHANGE UP (ref 3.3–5)
ALP SERPL-CCNC: 65 U/L — SIGNIFICANT CHANGE UP (ref 40–120)
ALT FLD-CCNC: 29 U/L — SIGNIFICANT CHANGE UP (ref 10–45)
ANION GAP SERPL CALC-SCNC: 11 MMOL/L — SIGNIFICANT CHANGE UP (ref 5–17)
AST SERPL-CCNC: 17 U/L — SIGNIFICANT CHANGE UP (ref 10–40)
BASOPHILS # BLD AUTO: 0.03 K/UL — SIGNIFICANT CHANGE UP (ref 0–0.2)
BASOPHILS NFR BLD AUTO: 0.5 % — SIGNIFICANT CHANGE UP (ref 0–2)
BILIRUB SERPL-MCNC: 0.4 MG/DL — SIGNIFICANT CHANGE UP (ref 0.2–1.2)
BUN SERPL-MCNC: 7 MG/DL — SIGNIFICANT CHANGE UP (ref 7–23)
CALCIUM SERPL-MCNC: 9.2 MG/DL — SIGNIFICANT CHANGE UP (ref 8.4–10.5)
CHLORIDE SERPL-SCNC: 103 MMOL/L — SIGNIFICANT CHANGE UP (ref 96–108)
CO2 SERPL-SCNC: 25 MMOL/L — SIGNIFICANT CHANGE UP (ref 22–31)
CREAT SERPL-MCNC: 0.61 MG/DL — SIGNIFICANT CHANGE UP (ref 0.5–1.3)
EOSINOPHIL # BLD AUTO: 0.09 K/UL — SIGNIFICANT CHANGE UP (ref 0–0.5)
EOSINOPHIL NFR BLD AUTO: 1.4 % — SIGNIFICANT CHANGE UP (ref 0–6)
GLUCOSE SERPL-MCNC: 104 MG/DL — HIGH (ref 70–99)
HCT VFR BLD CALC: 38 % — SIGNIFICANT CHANGE UP (ref 34.5–45)
HGB BLD-MCNC: 13.2 G/DL — SIGNIFICANT CHANGE UP (ref 11.5–15.5)
IMM GRANULOCYTES NFR BLD AUTO: 0.2 % — SIGNIFICANT CHANGE UP (ref 0–1.5)
LIDOCAIN IGE QN: 37 U/L — SIGNIFICANT CHANGE UP (ref 7–60)
LYMPHOCYTES # BLD AUTO: 2.71 K/UL — SIGNIFICANT CHANGE UP (ref 1–3.3)
LYMPHOCYTES # BLD AUTO: 43.6 % — SIGNIFICANT CHANGE UP (ref 13–44)
MCHC RBC-ENTMCNC: 33.2 PG — SIGNIFICANT CHANGE UP (ref 27–34)
MCHC RBC-ENTMCNC: 34.7 GM/DL — SIGNIFICANT CHANGE UP (ref 32–36)
MCV RBC AUTO: 95.5 FL — SIGNIFICANT CHANGE UP (ref 80–100)
MONOCYTES # BLD AUTO: 0.4 K/UL — SIGNIFICANT CHANGE UP (ref 0–0.9)
MONOCYTES NFR BLD AUTO: 6.4 % — SIGNIFICANT CHANGE UP (ref 2–14)
NEUTROPHILS # BLD AUTO: 2.97 K/UL — SIGNIFICANT CHANGE UP (ref 1.8–7.4)
NEUTROPHILS NFR BLD AUTO: 47.9 % — SIGNIFICANT CHANGE UP (ref 43–77)
NRBC # BLD: 0 /100 WBCS — SIGNIFICANT CHANGE UP (ref 0–0)
PLATELET # BLD AUTO: 177 K/UL — SIGNIFICANT CHANGE UP (ref 150–400)
POTASSIUM SERPL-MCNC: 4 MMOL/L — SIGNIFICANT CHANGE UP (ref 3.5–5.3)
POTASSIUM SERPL-SCNC: 4 MMOL/L — SIGNIFICANT CHANGE UP (ref 3.5–5.3)
PROT SERPL-MCNC: 7 G/DL — SIGNIFICANT CHANGE UP (ref 6–8.3)
RBC # BLD: 3.98 M/UL — SIGNIFICANT CHANGE UP (ref 3.8–5.2)
RBC # FLD: 12.3 % — SIGNIFICANT CHANGE UP (ref 10.3–14.5)
SODIUM SERPL-SCNC: 139 MMOL/L — SIGNIFICANT CHANGE UP (ref 135–145)
WBC # BLD: 6.21 K/UL — SIGNIFICANT CHANGE UP (ref 3.8–10.5)
WBC # FLD AUTO: 6.21 K/UL — SIGNIFICANT CHANGE UP (ref 3.8–10.5)

## 2020-06-08 PROCEDURE — 99285 EMERGENCY DEPT VISIT HI MDM: CPT

## 2020-06-08 RX ORDER — SODIUM CHLORIDE 9 MG/ML
1000 INJECTION INTRAMUSCULAR; INTRAVENOUS; SUBCUTANEOUS ONCE
Refills: 0 | Status: COMPLETED | OUTPATIENT
Start: 2020-06-08 | End: 2020-06-08

## 2020-06-08 RX ORDER — ONDANSETRON 8 MG/1
4 TABLET, FILM COATED ORAL ONCE
Refills: 0 | Status: COMPLETED | OUTPATIENT
Start: 2020-06-08 | End: 2020-06-08

## 2020-06-08 RX ORDER — FAMOTIDINE 10 MG/ML
20 INJECTION INTRAVENOUS ONCE
Refills: 0 | Status: COMPLETED | OUTPATIENT
Start: 2020-06-08 | End: 2020-06-08

## 2020-06-08 RX ADMIN — ONDANSETRON 4 MILLIGRAM(S): 8 TABLET, FILM COATED ORAL at 23:24

## 2020-06-08 RX ADMIN — Medication 30 MILLILITER(S): at 23:24

## 2020-06-08 RX ADMIN — SODIUM CHLORIDE 1000 MILLILITER(S): 9 INJECTION INTRAMUSCULAR; INTRAVENOUS; SUBCUTANEOUS at 23:24

## 2020-06-08 RX ADMIN — FAMOTIDINE 20 MILLIGRAM(S): 10 INJECTION INTRAVENOUS at 23:24

## 2020-06-08 NOTE — ED PROVIDER NOTE - PATIENT PORTAL LINK FT
You can access the FollowMyHealth Patient Portal offered by [facility] by registering at the following website: [email or web address]. By joining [employer]’s FollowMyHealth portal, you will also be able to view your health information using other applications (apps) compatible with our system.

## 2020-06-08 NOTE — ED PROVIDER NOTE - NSFOLLOWUPCLINICS_GEN_ALL_ED_FT
An ENT Doctor  Otolaryngology (ENT)  .  [place]   Phone:   Fax:   Follow Up Time:     [facility] - ENT  Otolaryngology (ENT)  [address] [place] [address]  Phone: [phone]  Fax:   Follow Up Time:     [facility]  Otolaryngology (ENT)  [address] - [address]th [place] [address]  Phone: [phone]  Fax:   Follow Up Time:

## 2020-06-08 NOTE — ED PROVIDER NOTE - NSFOLLOWUPINSTRUCTIONS_ED_ALL_ED_FT
You were seen today for abdominal pain. Please continue taking omeprazole. We are sending Pepcid to your pharmacy. Please take 20mg Pepcid every day. Please avoid Motrin/Ibuprofen. Follow up with your gastroenterologist for an endoscopy.  Please follow up with the neurologist for the dizziness. You can also make an appointment with an Ear, Nose, Throat doctor, as well  If you develop worsening abdominal pain, blood in stool, or any other concerns, please come back to the emergency room.

## 2020-06-08 NOTE — ED ADULT NURSE NOTE - OBJECTIVE STATEMENT
35 y/o Female presenitng to the ED ambulatory, A&Ox3, complaining of epigastric pain and burning of the throat when laying flat and after eating. Pt hx of acid reflux, takes omeprazole and has had no relief recently. Pt reports sometimes the pain radiates to the upper back. Pt hx of sepsis from e. coli in december. Pt denies vomiting, hematemesis, lower back pain, dysuria, hematuria, fever, chills, dizziness, palpitations, weakness, dark or tarry stools, diarrhea, difficulty controlling secretions. Pt appears well, in no current distress. Airway patent, pt able to speak in full clear sentences, able to control secretions. Safety and comfort measures provided, bed locked and in lowest position, side rails up for safety. Call bell within reach. Awaiting MD evaluation.

## 2020-06-08 NOTE — ED PROVIDER NOTE - PHYSICAL EXAMINATION
Attn - pt is alert and nad.  no pallor or jaundice, moist mm, lungs -clear,  cor - rr, no M, Abdo - soft, sl. tender epigastrium.  no HSM, no CVAT.

## 2020-06-08 NOTE — ED ADULT NURSE REASSESSMENT NOTE - NS ED NURSE REASSESS COMMENT FT1
Pt resting quietly on stretcher in no distress with fluids infusing, IV intact. Safety and comfort measures provided, bed locked and in lowest position, side rails up for safety. Call bell within reach. Awaiting all blood results.

## 2020-06-08 NOTE — ED PROVIDER NOTE - CLINICAL SUMMARY MEDICAL DECISION MAKING FREE TEXT BOX
Attn - pt with hx of GERD and worsening symptoms x one week.  likely GERD with esophagitis.  labs, Maalox, protonix, reassess. referral to GI

## 2020-06-08 NOTE — ED PROVIDER NOTE - OBJECTIVE STATEMENT
35 y/o F with pmhx acid reflux on omeprazole presents to the ED c/o throat pain, CP, and back pain x8 days. Pt endorses omeprazole but has not been compliant. States took for 1 1/2 month, stopped for 15 days, and started once again. Has not been helping with reflux. Reports when she eats, she "feels food coming up throat." Unable to sleep 2/2 worsening reflux. Was scheduled for endoscopy but was cancelled due to current pandemic. + SOB when having reflux. +upper abd pain, chills, nausea. Denies fever, v/d, dysuria, cough. Reports having dizziness and blurred vision since march 25th when she was ill. Tested negative for COVID at that time. +sick contacts. LNMP: May 12th. 35 y/o F with pmhx acid reflux on omeprazole presents to the ED c/o throat pain, CP, and back pain x8 days. Pt endorses omeprazole but has not been compliant. States took for 1 1/2 month, stopped for 15 days, and started once again. Has not been helping with reflux. Reports when she eats, she "feels food coming up throat." Unable to sleep 2/2 worsening reflux. Was scheduled for endoscopy but was cancelled due to current pandemic. + SOB when having reflux. +upper abd pain, chills, nausea. Denies fever, v/d, dysuria, cough. Reports having dizziness and blurred vision since march 25th when she was ill. Tested negative for COVID at that time. +sick contacts. LNMP: May 12th.     Attn - pt seen in [facility] - agree with above - pt with hx of GERD x months and c/o worsening symptoms the last week with burning up into chest from epigastrium without vomiting, gi bleeding.  occasional nausea.  no fever or back pain.  worse with eating.  no relief now with omeprazole.

## 2020-06-08 NOTE — ED PROVIDER NOTE - PROGRESS NOTE DETAILS
Alexei: Patient feeling better. Abdominal pain improved. Lightheadedness still persistent since March. Will give ENT follow up; pt has a neurology appt in July.

## 2020-06-09 ENCOUNTER — APPOINTMENT (OUTPATIENT)
Dept: INTERNAL MEDICINE | Facility: CLINIC | Age: 35
End: 2020-06-09

## 2020-06-09 ENCOUNTER — OUTPATIENT (OUTPATIENT)
Dept: OUTPATIENT SERVICES | Facility: HOSPITAL | Age: 35
LOS: 1 days | End: 2020-06-09
Payer: SELF-PAY

## 2020-06-09 VITALS
RESPIRATION RATE: 16 BRPM | SYSTOLIC BLOOD PRESSURE: 114 MMHG | OXYGEN SATURATION: 100 % | TEMPERATURE: 99 F | HEART RATE: 66 BPM | DIASTOLIC BLOOD PRESSURE: 69 MMHG

## 2020-06-09 DIAGNOSIS — R42 DIZZINESS AND GIDDINESS: ICD-10-CM

## 2020-06-09 DIAGNOSIS — R07.89 OTHER CHEST PAIN: ICD-10-CM

## 2020-06-09 DIAGNOSIS — I10 ESSENTIAL (PRIMARY) HYPERTENSION: ICD-10-CM

## 2020-06-09 DIAGNOSIS — K21.9 GASTRO-ESOPHAGEAL REFLUX DISEASE WITHOUT ESOPHAGITIS: ICD-10-CM

## 2020-06-09 PROCEDURE — 96374 THER/PROPH/DIAG INJ IV PUSH: CPT

## 2020-06-09 PROCEDURE — 83690 ASSAY OF LIPASE: CPT

## 2020-06-09 PROCEDURE — G0463: CPT

## 2020-06-09 PROCEDURE — 80053 COMPREHEN METABOLIC PANEL: CPT

## 2020-06-09 PROCEDURE — 96375 TX/PRO/DX INJ NEW DRUG ADDON: CPT

## 2020-06-09 PROCEDURE — 85027 COMPLETE CBC AUTOMATED: CPT

## 2020-06-09 PROCEDURE — 99284 EMERGENCY DEPT VISIT MOD MDM: CPT | Mod: 25

## 2020-06-09 RX ORDER — MECLIZINE HCL 12.5 MG
25 TABLET ORAL ONCE
Refills: 0 | Status: COMPLETED | OUTPATIENT
Start: 2020-06-09 | End: 2020-06-09

## 2020-06-09 RX ORDER — FAMOTIDINE 10 MG/ML
1 INJECTION INTRAVENOUS
Qty: 14 | Refills: 0
Start: 2020-06-09 | End: 2020-06-22

## 2020-06-09 RX ADMIN — Medication 25 MILLIGRAM(S): at 00:19

## 2020-06-11 PROBLEM — K21.9 GASTRO-ESOPHAGEAL REFLUX DISEASE WITHOUT ESOPHAGITIS: Chronic | Status: ACTIVE | Noted: 2020-06-09

## 2020-06-11 NOTE — PLAN
[FreeTextEntry1] : ##Reflux with atypical chest pain- improving \par -patient was not adherent on omeprazole\par -associated chest pain with negative EKG in ER on 6/8\par -low concern for ACS\par -will continue protonix, consider doubling PPI\par -continue with pepcid\par -GI referral re-send\par -asked to forward records from OSH\par -advised ER if symptoms worsen\par \par ##Vertigo/visual changes\par -less likely BPPV given lack of responsiveness to meclizine\par -preeceding tinnitus and ear pain also makes BPPV less likely\par -fits Meniere's disease or vestibular neuritis/labaryntitis picture more\par -has neurology appointment in July, in process of expiditing\par -ENT referral sent to address \par -advised ER if symptoms worsen\par \par Dispo: instructed patient to make follow up in 5 weeks\par \par case d/w Dr. Vo\par Rene Ngo MD, PGY-1\par Internal Medicine\par [facility]\par

## 2020-06-11 NOTE — HISTORY OF PRESENT ILLNESS
[FreeTextEntry1] : Follow up [de-identified] : Patient is a 34 y.o French speaking F w/ PMH GERD, hypothyroidism, vertigo with blurry vision, and obesity who presents for a telemedicine visit. \par \par Of note the patient visited [facility] ED on 6/8 for abdominal pain, chest/back pain associated with reflux symptoms. In the ED EKG was negative for ischemic changes, CXR was clear, and there was a non-focal neurologic exam. The patient was given maalox, protonix, and GI referral.  Pepcid helps patient a little. Patient did not receive intended GI referral. Patient continues to endorse chest and back pain that has slightly improved since 3/25. Patient also noted dizziness and visual changes that have been present since 3/25. Patient has received cardiac and neurologic work up, including MRI from OSH, that was negative. The patient has an appointment with neurology scheduled for July, and in the process of attempting to expidite appointment. \par \par For patient dizziness has been taking 25mg 2-3x a day. Has not improved with meclizine. For dizziness, patient does not endorse true vertigo. Instead endorses losing balance. Had ear pain in both ears 1 month prior to symptom onset that resolved. Also endorses prior tinnitus, with rare tinnitus currently. Currently menstruating.\par \par Denies vomittins, diarrhea, weight change, fevers, chills or exertional CP.

## 2020-06-14 ENCOUNTER — APPOINTMENT (OUTPATIENT)
Dept: DISASTER EMERGENCY | Facility: CLINIC | Age: 35
End: 2020-06-14

## 2020-06-14 LAB — SARS-COV-2 N GENE NPH QL NAA+PROBE: NOT DETECTED

## 2020-06-16 ENCOUNTER — EMERGENCY (EMERGENCY)
Facility: HOSPITAL | Age: 35
LOS: 1 days | Discharge: HOME | End: 2020-06-16
Attending: EMERGENCY MEDICINE
Payer: MEDICAID

## 2020-06-16 ENCOUNTER — APPOINTMENT (OUTPATIENT)
Dept: INTERNAL MEDICINE | Facility: CLINIC | Age: 35
End: 2020-06-16

## 2020-06-16 VITALS
RESPIRATION RATE: 20 BRPM | TEMPERATURE: 99 F | SYSTOLIC BLOOD PRESSURE: 119 MMHG | HEART RATE: 84 BPM | DIASTOLIC BLOOD PRESSURE: 78 MMHG | OXYGEN SATURATION: 100 %

## 2020-06-16 VITALS
TEMPERATURE: 99 F | RESPIRATION RATE: 20 BRPM | HEART RATE: 86 BPM | HEIGHT: 62 IN | OXYGEN SATURATION: 100 % | DIASTOLIC BLOOD PRESSURE: 67 MMHG | SYSTOLIC BLOOD PRESSURE: 122 MMHG

## 2020-06-16 LAB
ALBUMIN SERPL ELPH-MCNC: 4.7 G/DL — SIGNIFICANT CHANGE UP (ref 3.3–5)
ALP SERPL-CCNC: 71 U/L — SIGNIFICANT CHANGE UP (ref 40–120)
ALT FLD-CCNC: 40 U/L — SIGNIFICANT CHANGE UP (ref 10–45)
ANION GAP SERPL CALC-SCNC: 18 MMOL/L — HIGH (ref 5–17)
AST SERPL-CCNC: 41 U/L — HIGH (ref 10–40)
BASOPHILS # BLD AUTO: 0.04 K/UL — SIGNIFICANT CHANGE UP (ref 0–0.2)
BASOPHILS NFR BLD AUTO: 0.6 % — SIGNIFICANT CHANGE UP (ref 0–2)
BILIRUB SERPL-MCNC: 0.4 MG/DL — SIGNIFICANT CHANGE UP (ref 0.2–1.2)
BUN SERPL-MCNC: 8 MG/DL — SIGNIFICANT CHANGE UP (ref 7–23)
CALCIUM SERPL-MCNC: 9.9 MG/DL — SIGNIFICANT CHANGE UP (ref 8.4–10.5)
CHLORIDE SERPL-SCNC: 103 MMOL/L — SIGNIFICANT CHANGE UP (ref 96–108)
CO2 SERPL-SCNC: 19 MMOL/L — LOW (ref 22–31)
CREAT SERPL-MCNC: 0.59 MG/DL — SIGNIFICANT CHANGE UP (ref 0.5–1.3)
EOSINOPHIL # BLD AUTO: 0.05 K/UL — SIGNIFICANT CHANGE UP (ref 0–0.5)
EOSINOPHIL NFR BLD AUTO: 0.7 % — SIGNIFICANT CHANGE UP (ref 0–6)
GLUCOSE SERPL-MCNC: 106 MG/DL — HIGH (ref 70–99)
HCG UR QL: NEGATIVE — SIGNIFICANT CHANGE UP
HCT VFR BLD CALC: 43.1 % — SIGNIFICANT CHANGE UP (ref 34.5–45)
HGB BLD-MCNC: 15.1 G/DL — SIGNIFICANT CHANGE UP (ref 11.5–15.5)
IMM GRANULOCYTES NFR BLD AUTO: 0.1 % — SIGNIFICANT CHANGE UP (ref 0–1.5)
LYMPHOCYTES # BLD AUTO: 2.74 K/UL — SIGNIFICANT CHANGE UP (ref 1–3.3)
LYMPHOCYTES # BLD AUTO: 39.4 % — SIGNIFICANT CHANGE UP (ref 13–44)
MCHC RBC-ENTMCNC: 33.9 PG — SIGNIFICANT CHANGE UP (ref 27–34)
MCHC RBC-ENTMCNC: 35 GM/DL — SIGNIFICANT CHANGE UP (ref 32–36)
MCV RBC AUTO: 96.9 FL — SIGNIFICANT CHANGE UP (ref 80–100)
MONOCYTES # BLD AUTO: 0.4 K/UL — SIGNIFICANT CHANGE UP (ref 0–0.9)
MONOCYTES NFR BLD AUTO: 5.7 % — SIGNIFICANT CHANGE UP (ref 2–14)
NEUTROPHILS # BLD AUTO: 3.72 K/UL — SIGNIFICANT CHANGE UP (ref 1.8–7.4)
NEUTROPHILS NFR BLD AUTO: 53.5 % — SIGNIFICANT CHANGE UP (ref 43–77)
NRBC # BLD: 0 /100 WBCS — SIGNIFICANT CHANGE UP (ref 0–0)
PLATELET # BLD AUTO: 121 K/UL — LOW (ref 150–400)
POTASSIUM SERPL-MCNC: 4.3 MMOL/L — SIGNIFICANT CHANGE UP (ref 3.5–5.3)
POTASSIUM SERPL-SCNC: 4.3 MMOL/L — SIGNIFICANT CHANGE UP (ref 3.5–5.3)
PROT SERPL-MCNC: 7.9 G/DL — SIGNIFICANT CHANGE UP (ref 6–8.3)
RBC # BLD: 4.45 M/UL — SIGNIFICANT CHANGE UP (ref 3.8–5.2)
RBC # FLD: 12.4 % — SIGNIFICANT CHANGE UP (ref 10.3–14.5)
SODIUM SERPL-SCNC: 140 MMOL/L — SIGNIFICANT CHANGE UP (ref 135–145)
T3 SERPL-MCNC: 98 NG/DL — SIGNIFICANT CHANGE UP (ref 80–200)
T4 AB SER-ACNC: 7 UG/DL — SIGNIFICANT CHANGE UP (ref 4.6–12)
TSH SERPL-MCNC: 1.34 UIU/ML — SIGNIFICANT CHANGE UP (ref 0.27–4.2)
WBC # BLD: 6.96 K/UL — SIGNIFICANT CHANGE UP (ref 3.8–10.5)
WBC # FLD AUTO: 6.96 K/UL — SIGNIFICANT CHANGE UP (ref 3.8–10.5)

## 2020-06-16 PROCEDURE — 76512 OPH US DX B-SCAN: CPT | Mod: 26,LT

## 2020-06-16 PROCEDURE — 99284 EMERGENCY DEPT VISIT MOD MDM: CPT | Mod: 25

## 2020-06-16 PROCEDURE — 80053 COMPREHEN METABOLIC PANEL: CPT

## 2020-06-16 PROCEDURE — 81025 URINE PREGNANCY TEST: CPT

## 2020-06-16 PROCEDURE — 93005 ELECTROCARDIOGRAM TRACING: CPT

## 2020-06-16 PROCEDURE — 84443 ASSAY THYROID STIM HORMONE: CPT

## 2020-06-16 PROCEDURE — 96374 THER/PROPH/DIAG INJ IV PUSH: CPT

## 2020-06-16 PROCEDURE — 93010 ELECTROCARDIOGRAM REPORT: CPT

## 2020-06-16 PROCEDURE — 84436 ASSAY OF TOTAL THYROXINE: CPT

## 2020-06-16 PROCEDURE — 76512 OPH US DX B-SCAN: CPT

## 2020-06-16 PROCEDURE — 85027 COMPLETE CBC AUTOMATED: CPT

## 2020-06-16 PROCEDURE — 84480 ASSAY TRIIODOTHYRONINE (T3): CPT

## 2020-06-16 PROCEDURE — 99285 EMERGENCY DEPT VISIT HI MDM: CPT

## 2020-06-16 RX ORDER — ACETAMINOPHEN 500 MG
975 TABLET ORAL ONCE
Refills: 0 | Status: DISCONTINUED | OUTPATIENT
Start: 2020-06-16 | End: 2020-06-16

## 2020-06-16 RX ORDER — SODIUM CHLORIDE 9 MG/ML
1000 INJECTION, SOLUTION INTRAVENOUS ONCE
Refills: 0 | Status: COMPLETED | OUTPATIENT
Start: 2020-06-16 | End: 2020-06-16

## 2020-06-16 RX ORDER — DIPHENHYDRAMINE HCL 50 MG
25 CAPSULE ORAL ONCE
Refills: 0 | Status: COMPLETED | OUTPATIENT
Start: 2020-06-16 | End: 2020-06-16

## 2020-06-16 RX ORDER — METOCLOPRAMIDE HCL 10 MG
10 TABLET ORAL ONCE
Refills: 0 | Status: COMPLETED | OUTPATIENT
Start: 2020-06-16 | End: 2020-06-16

## 2020-06-16 RX ADMIN — SODIUM CHLORIDE 1000 MILLILITER(S): 9 INJECTION, SOLUTION INTRAVENOUS at 16:23

## 2020-06-16 RX ADMIN — Medication 25 MILLIGRAM(S): at 17:31

## 2020-06-16 RX ADMIN — Medication 10 MILLIGRAM(S): at 16:11

## 2020-06-16 NOTE — ED ADULT NURSE NOTE - OBJECTIVE STATEMENT
34 year old female presenting to ED c/o headache and blurry vision. PMH includes acid reflux and hypothyroidism. Pt A+Ox3, moves all four extremities, reports intermittent dizziness, 4/10 headache and blurry vision x3 months. Pt reports visiting [facility] multiple times for same symptoms, however reports feeling faint, which brings her in today. Pt denies alleviating or exacerbating factors. Pt denies chest pain, SOB, N/V, abdominal pain, urinary or bowel symptoms, fevers or chills. EKG completed.

## 2020-06-16 NOTE — ED ADULT NURSE NOTE - NSIMPLEMENTINTERV_GEN_ALL_ED
Implemented All Universal Safety Interventions:  [place] to call system. Call bell, personal items and telephone within reach. Instruct patient to call for assistance. Room bathroom lighting operational. Non-slip footwear when patient is off stretcher. Physically safe environment: no spills, clutter or unnecessary equipment. Stretcher in lowest position, wheels locked, appropriate side rails in place.

## 2020-06-16 NOTE — ED PROVIDER NOTE - PRO INTERPRETER NEED 2
Patient called stated review of systems is not correct, patient spoke Primary care providers office and was informed Dr. Pickard will have to correct all of the deficiencies.  Patient states he would like the review of systems to be corrected, patient did not deny symptoms and the social security office will see this note. Patient states is it possible to change the problems that are denied.patient feels no one asked him the questions in the review of systems. Patient states cardiac and pulmonary function test do not match his copy he requested. Informed patient that I will inform provider,provider will not be in the office until 4/21/17.   [place]

## 2020-06-16 NOTE — ED PROVIDER NOTE - NS ED ROS FT
GENERAL: No fever or chills  EYES: blurry vision  HEENT: no trouble swallowing or speaking  CARDIAC: no chest pain or palpitations  PULMONARY: no cough or SOB  GI: no abdominal pain, nausea, vomiting, diarrhea, or constipation   : No changes in urination  SKIN: no rashes  NEURO: headache, dizziness   MSK: No joint pain

## 2020-06-16 NOTE — ED PROVIDER NOTE - NSFOLLOWUPINSTRUCTIONS_ED_ALL_ED_FT
1. Se dykes proporcionado y discutido con usted shanique copia de cualquiera de lynne resultados de laboratorio, imágenes y hallazgos.  2. Kristal un seguimiento con knight médico de atención primaria dentro de las 48 horas para evaluar los síntomas que le vieron en el departamento de emergencias y para revisar todos los resultados de knight visita. Si no tiene un médico, llame al [phone] para hacer shanique noemí.  3. [place] 500 mg de tylenol cada 6 horas para el dolor de audra según sea necesario. También puede ghanshyam ibuprofeno 600 mg cada 6 horas para el dolor de audra.  4. Regrese de inmediato al departamento de emergencias para detectar síntomas o signos nuevos, persistentes o que empeoren.  5. Vaya a knight noemí de neurología programada.    1. A copy of any of your resulted labs, imaging, and findings have been provided and discussed with you.   2. Follow up with your primary care doctor within 48 hours to assess the symptoms you were seen for in the emergency department and to review all results from your visit. If you don't have a doctor, call [phone] to make an appointment.  3. Take tylenol 500mg every 6 hours for headache as needed. Can also take ibuprofen 600mg every 6 hours for headache.  4. Return immediately to the emergency department for new, persistent, or worsening symptoms or signs.   5. Go to your scheduled neurology appointment

## 2020-06-16 NOTE — ED PROVIDER NOTE - CLINICAL SUMMARY MEDICAL DECISION MAKING FREE TEXT BOX
Patient presenting chronic headaches with associated dizziness, blurry vision with extensive workup at OSH. Will repeat screening labs, US ocular for possible increased intracranial pressure for central source. Likely peripheral vertigo with benign neuro exam. Patient presenting chronic headaches with associated dizziness, blurry vision with extensive workup at OSH. Will repeat screening labs, US ocular for possible increased intracranial pressure for central source. Likely peripheral vertigo with benign neuro exam.  Attending Cammy Haley: 33 y/o female presenting with posterior headache for last 3 months. had MRI/MRA at [facility] which she has the results with her, showing no acute process. upon arrival pt neurologically intact without focal deficit. no recent trauma or injury. no h/o neck manipulation. not on OCP and no h/o hypercoagulability making central venous thrombosis less likely. pt afebrile, no neck pain or rash making meningitis or encephalitis less likely. no h/o bites or joint pain to suggest lyme. no visual complaints or evidence of optic sheath dilation on pocus, less likely pseudotumor. will pain control and re-eval. if persistent pain consider LP

## 2020-06-16 NOTE — ED PROVIDER NOTE - ATTENDING CONTRIBUTION TO CARE
Attending MD Cammy Haley:  I personally have seen and examined this patient.  Resident note reviewed and agree on plan of care and except where noted.  See HPI, PE, and MDM for details.

## 2020-06-16 NOTE — ED PROVIDER NOTE - OBJECTIVE STATEMENT
34F with pmh gerd, hypothyroid? presenting with 3+ months of intermittent headache, dizziness, blurry vision. Evaluated at OSH [place] multiple times for same symptoms with extensive workup including labs, CT head, MR/MRA brain/neck with no significant findings. Treated for peripheral vertigo with meclizine with no relief. No report of fever, chills, cp, sob, nausea, vomiting, diarrhea 34F with pmh gerd, hypothyroid? presenting with 3+ months of intermittent, almost daily headache, dizziness, blurry vision. Evaluated at OSH [place] multiple times for same symptoms with extensive workup including labs, CT head, MR/MRA brain/neck with no significant findings. Treated for peripheral vertigo with meclizine with no relief. No report of fever, chills, cp, sob, nausea, vomiting, diarrhea

## 2020-06-16 NOTE — ED PROVIDER NOTE - PHYSICAL EXAMINATION
GEN: NAD, awake, well appearing  HEENT: NCAT, MMM, normal conjunctiva, perrl  CHEST/LUNGS: Non-tachypneic, CTAB, bilateral breath sounds  CARDIAC: Non-tachycardic, s1s2, normal perfusion, no peripheral edema  ABDOMEN: Soft, NTND, No rebound/guarding  MSK: No joint tenderness, no gross deformity of extremities  SKIN: No rashes, no petechiae, no vesicles  NEURO: CN grossly intact, fatigable horizontal nystagmus, normal coordination, no focal motor or sensory deficits, no dysmetria, no drift GEN: NAD, awake, well appearing  HEENT: NCAT, MMM, normal conjunctiva, perrl  CHEST/LUNGS: Non-tachypneic, CTAB, bilateral breath sounds  CARDIAC: Non-tachycardic, s1s2, normal perfusion, no peripheral edema  ABDOMEN: Soft, NTND, No rebound/guarding  MSK: No joint tenderness, no gross deformity of extremities  SKIN: No rashes, no petechiae, no vesicles  NEURO: CN grossly intact, fatigable horizontal nystagmus, normal coordination, no focal motor or sensory deficits, no dysmetria, no drift  Attending Cammy Haley: Gen: NAD, heent: atrauamtic, eomi, perrla, mmm, op pink, uvula midline, neck; nttp, no nuchal rigidity, chest: nttp, no crepitus, cv: rrr, no murmurs, lungs: ctab, abd: soft, nontender, nondistended, no peritoneal signs, +BS, no guarding, ext: wwp, neg homans, skin: no rash, neuro: awake and alert, following commands, speech clear, sensation and strength intact, no focal deficits, stable gait, no protonator drift

## 2020-06-16 NOTE — ED PROVIDER NOTE - PROGRESS NOTE DETAILS
patient stating headache and nausea resolved. Patient with follow up appointment with neurology in 2 weeks. Will recommend headache diary patient stating headache and nausea resolved. Patient with follow up appointment with neurology in 2 weeks. Will recommend headache diary. Patient understanding ( # [id number])

## 2020-06-16 NOTE — ED PROVIDER NOTE - PATIENT PORTAL LINK FT
You can access the FollowMyHealth Patient Portal offered by [facility] by registering at the following website: [email or web address]. By joining [employer]’s FollowMyHealth portal, you will also be able to view your health information using other applications (apps) compatible with our system.

## 2020-06-17 ENCOUNTER — OUTPATIENT (OUTPATIENT)
Dept: OUTPATIENT SERVICES | Facility: HOSPITAL | Age: 35
LOS: 1 days | Discharge: HOME | End: 2020-06-17
Payer: SELF-PAY

## 2020-06-17 ENCOUNTER — RESULT REVIEW (OUTPATIENT)
Age: 35
End: 2020-06-17

## 2020-06-17 DIAGNOSIS — K21.9 GASTRO-ESOPHAGEAL REFLUX DISEASE WITHOUT ESOPHAGITIS: ICD-10-CM

## 2020-06-17 DIAGNOSIS — R09.89 OTHER SPECIFIED SYMPTOMS AND SIGNS INVOLVING THE CIRCULATORY AND RESPIRATORY SYSTEMS: ICD-10-CM

## 2020-06-17 PROCEDURE — 43239 EGD BIOPSY SINGLE/MULTIPLE: CPT | Mod: GC

## 2020-06-17 PROCEDURE — 43239 EGD BIOPSY SINGLE/MULTIPLE: CPT

## 2020-06-22 ENCOUNTER — EMERGENCY (EMERGENCY)
Facility: HOSPITAL | Age: 35
LOS: 1 days | Discharge: HOME | End: 2020-06-22
Attending: STUDENT IN AN ORGANIZED HEALTH CARE EDUCATION/TRAINING PROGRAM
Payer: MEDICAID

## 2020-06-22 VITALS
RESPIRATION RATE: 18 BRPM | HEIGHT: 62 IN | HEART RATE: 85 BPM | OXYGEN SATURATION: 97 % | SYSTOLIC BLOOD PRESSURE: 118 MMHG | TEMPERATURE: 99 F | DIASTOLIC BLOOD PRESSURE: 84 MMHG | WEIGHT: 160.06 LBS

## 2020-06-22 VITALS
TEMPERATURE: 98 F | HEART RATE: 69 BPM | OXYGEN SATURATION: 99 % | RESPIRATION RATE: 16 BRPM | DIASTOLIC BLOOD PRESSURE: 75 MMHG | SYSTOLIC BLOOD PRESSURE: 108 MMHG

## 2020-06-22 LAB
ALBUMIN SERPL ELPH-MCNC: 4.8 G/DL — SIGNIFICANT CHANGE UP (ref 3.3–5)
ALP SERPL-CCNC: 66 U/L — SIGNIFICANT CHANGE UP (ref 40–120)
ALT FLD-CCNC: 36 U/L — SIGNIFICANT CHANGE UP (ref 10–45)
ANION GAP SERPL CALC-SCNC: 15 MMOL/L — SIGNIFICANT CHANGE UP (ref 5–17)
APPEARANCE UR: CLEAR — SIGNIFICANT CHANGE UP
AST SERPL-CCNC: 26 U/L — SIGNIFICANT CHANGE UP (ref 10–40)
BASOPHILS # BLD AUTO: 0.04 K/UL — SIGNIFICANT CHANGE UP (ref 0–0.2)
BASOPHILS NFR BLD AUTO: 0.5 % — SIGNIFICANT CHANGE UP (ref 0–2)
BILIRUB SERPL-MCNC: 0.3 MG/DL — SIGNIFICANT CHANGE UP (ref 0.2–1.2)
BILIRUB UR-MCNC: NEGATIVE — SIGNIFICANT CHANGE UP
BUN SERPL-MCNC: 8 MG/DL — SIGNIFICANT CHANGE UP (ref 7–23)
CALCIUM SERPL-MCNC: 9.9 MG/DL — SIGNIFICANT CHANGE UP (ref 8.4–10.5)
CHLORIDE SERPL-SCNC: 103 MMOL/L — SIGNIFICANT CHANGE UP (ref 96–108)
CO2 SERPL-SCNC: 23 MMOL/L — SIGNIFICANT CHANGE UP (ref 22–31)
COLOR SPEC: SIGNIFICANT CHANGE UP
CREAT SERPL-MCNC: 0.56 MG/DL — SIGNIFICANT CHANGE UP (ref 0.5–1.3)
DIFF PNL FLD: NEGATIVE — SIGNIFICANT CHANGE UP
EOSINOPHIL # BLD AUTO: 0.06 K/UL — SIGNIFICANT CHANGE UP (ref 0–0.5)
EOSINOPHIL NFR BLD AUTO: 0.8 % — SIGNIFICANT CHANGE UP (ref 0–6)
GLUCOSE SERPL-MCNC: 94 MG/DL — SIGNIFICANT CHANGE UP (ref 70–99)
GLUCOSE UR QL: NEGATIVE — SIGNIFICANT CHANGE UP
HCG UR QL: NEGATIVE — SIGNIFICANT CHANGE UP
HCT VFR BLD CALC: 41 % — SIGNIFICANT CHANGE UP (ref 34.5–45)
HGB BLD-MCNC: 14.5 G/DL — SIGNIFICANT CHANGE UP (ref 11.5–15.5)
IMM GRANULOCYTES NFR BLD AUTO: 0.3 % — SIGNIFICANT CHANGE UP (ref 0–1.5)
KETONES UR-MCNC: NEGATIVE — SIGNIFICANT CHANGE UP
LEUKOCYTE ESTERASE UR-ACNC: NEGATIVE — SIGNIFICANT CHANGE UP
LYMPHOCYTES # BLD AUTO: 2.25 K/UL — SIGNIFICANT CHANGE UP (ref 1–3.3)
LYMPHOCYTES # BLD AUTO: 28.4 % — SIGNIFICANT CHANGE UP (ref 13–44)
MCHC RBC-ENTMCNC: 34 PG — SIGNIFICANT CHANGE UP (ref 27–34)
MCHC RBC-ENTMCNC: 35.4 GM/DL — SIGNIFICANT CHANGE UP (ref 32–36)
MCV RBC AUTO: 96 FL — SIGNIFICANT CHANGE UP (ref 80–100)
MONOCYTES # BLD AUTO: 0.46 K/UL — SIGNIFICANT CHANGE UP (ref 0–0.9)
MONOCYTES NFR BLD AUTO: 5.8 % — SIGNIFICANT CHANGE UP (ref 2–14)
NEUTROPHILS # BLD AUTO: 5.08 K/UL — SIGNIFICANT CHANGE UP (ref 1.8–7.4)
NEUTROPHILS NFR BLD AUTO: 64.2 % — SIGNIFICANT CHANGE UP (ref 43–77)
NITRITE UR-MCNC: NEGATIVE — SIGNIFICANT CHANGE UP
NRBC # BLD: 0 /100 WBCS — SIGNIFICANT CHANGE UP (ref 0–0)
PH UR: 6.5 — SIGNIFICANT CHANGE UP (ref 5–8)
PLATELET # BLD AUTO: 213 K/UL — SIGNIFICANT CHANGE UP (ref 150–400)
POTASSIUM SERPL-MCNC: 3.9 MMOL/L — SIGNIFICANT CHANGE UP (ref 3.5–5.3)
POTASSIUM SERPL-SCNC: 3.9 MMOL/L — SIGNIFICANT CHANGE UP (ref 3.5–5.3)
PROT SERPL-MCNC: 7.4 G/DL — SIGNIFICANT CHANGE UP (ref 6–8.3)
PROT UR-MCNC: NEGATIVE — SIGNIFICANT CHANGE UP
RBC # BLD: 4.27 M/UL — SIGNIFICANT CHANGE UP (ref 3.8–5.2)
RBC # FLD: 12.2 % — SIGNIFICANT CHANGE UP (ref 10.3–14.5)
SARS-COV-2 RNA SPEC QL NAA+PROBE: SIGNIFICANT CHANGE UP
SODIUM SERPL-SCNC: 141 MMOL/L — SIGNIFICANT CHANGE UP (ref 135–145)
SP GR SPEC: 1.01 — LOW (ref 1.01–1.02)
UROBILINOGEN FLD QL: NEGATIVE — SIGNIFICANT CHANGE UP
WBC # BLD: 7.91 K/UL — SIGNIFICANT CHANGE UP (ref 3.8–10.5)
WBC # FLD AUTO: 7.91 K/UL — SIGNIFICANT CHANGE UP (ref 3.8–10.5)

## 2020-06-22 PROCEDURE — 70496 CT ANGIOGRAPHY HEAD: CPT

## 2020-06-22 PROCEDURE — 87186 SC STD MICRODIL/AGAR DIL: CPT

## 2020-06-22 PROCEDURE — 81003 URINALYSIS AUTO W/O SCOPE: CPT

## 2020-06-22 PROCEDURE — 99285 EMERGENCY DEPT VISIT HI MDM: CPT

## 2020-06-22 PROCEDURE — 81025 URINE PREGNANCY TEST: CPT

## 2020-06-22 PROCEDURE — 70496 CT ANGIOGRAPHY HEAD: CPT | Mod: 26

## 2020-06-22 PROCEDURE — 99284 EMERGENCY DEPT VISIT MOD MDM: CPT

## 2020-06-22 PROCEDURE — 87086 URINE CULTURE/COLONY COUNT: CPT

## 2020-06-22 PROCEDURE — 85027 COMPLETE CBC AUTOMATED: CPT

## 2020-06-22 PROCEDURE — 80053 COMPREHEN METABOLIC PANEL: CPT

## 2020-06-22 RX ORDER — MECLIZINE HCL 12.5 MG
50 TABLET ORAL ONCE
Refills: 0 | Status: COMPLETED | OUTPATIENT
Start: 2020-06-22 | End: 2020-06-22

## 2020-06-22 RX ORDER — SODIUM CHLORIDE 9 MG/ML
1000 INJECTION INTRAMUSCULAR; INTRAVENOUS; SUBCUTANEOUS ONCE
Refills: 0 | Status: COMPLETED | OUTPATIENT
Start: 2020-06-22 | End: 2020-06-22

## 2020-06-22 RX ORDER — DIAZEPAM 5 MG
2 TABLET ORAL ONCE
Refills: 0 | Status: DISCONTINUED | OUTPATIENT
Start: 2020-06-22 | End: 2020-06-22

## 2020-06-22 RX ADMIN — Medication 2 MILLIGRAM(S): at 21:46

## 2020-06-22 RX ADMIN — Medication 50 MILLIGRAM(S): at 20:06

## 2020-06-22 RX ADMIN — SODIUM CHLORIDE 1000 MILLILITER(S): 9 INJECTION INTRAMUSCULAR; INTRAVENOUS; SUBCUTANEOUS at 20:06

## 2020-06-22 NOTE — ED PROVIDER NOTE - NS_ ATTENDINGSCRIBEDETAILS _ED_A_ED_FT
The scribe's documentation has been prepared under my direction and personally reviewed by me in its entirety. I confirm that the note above accurately reflects all work, treatment, procedures, and medical decision making performed by me. m

## 2020-06-22 NOTE — ED ADULT NURSE NOTE - NSIMPLEMENTINTERV_GEN_ALL_ED
Implemented All Universal Safety Interventions:  [place] to call system. Call bell, personal items and telephone within reach. Instruct patient to call for assistance. Room bathroom lighting operational. Non-slip footwear when patient is off stretcher. Physically safe environment: no spills, clutter or unnecessary equipment. Stretcher in lowest position, wheels locked, appropriate side rails in place.

## 2020-06-22 NOTE — ED PROVIDER NOTE - PHYSICAL EXAMINATION
Papa Aguirre (MD):  Gen: WNWD NAD  HEENT: NCAT PERRL EOMI normal pharynx, left TM bulging  Neck: supple  CV: RRR, no murmur  Lung: CTA BL  Abd: +BS soft NTND  Ext: wwp, palp pulses, FROMx4, no cce  Neuro: CN grossly intact, sensation intact, motor 5/5 throughout, no pronator drift Papa Aguirre (MD):  Gen: WNWD NAD  HEENT: NCAT PERRL EOMI normal pharynx, left TM bulging with no erythema or effusion  Neck: supple  CV: RRR, no murmur  Lung: CTA BL  Abd: +BS soft NTND  Ext: wwp, palp pulses, FROMx4, no cce  Neuro: CN grossly intact, sensation intact, motor 5/5 throughout, no pronator drift

## 2020-06-22 NOTE — ED PROVIDER NOTE - NSFOLLOWUPCLINICS_GEN_ALL_ED_FT
[facility]  Neurology  [address] [place] [address] [address]rd [place] [address]  Phone: [phone]  Fax:   Follow Up Time:     [facility]  Neurology  [address] [place] [address]  Phone: [phone]  Fax: [phone]  Follow Up Time:

## 2020-06-22 NOTE — ED PROVIDER NOTE - OBJECTIVE STATEMENT
34y F presents to ED c/o blurry vision, HA, room spinning dizziness, intermittent CP x 3 mo. Pt reports that sx started after having COVID in April. Today tested COVID negative but positive for antibodies. Pt reports that she went to outside hospital and had negative MRA, negative non con ECHO. Chart review reveals she was here a week ago and had negative ocular US. Denies any other medical problems and denies taking any medication currently. Further denies fever, vomiting, abd pain, other sx. 34y F presents to ED c/o blurry vision, HA, room spinning dizziness, intermittent CP x 3 mo. Pt reports that sx started after having COVID in April. Today tested COVID negative but positive for antibodies. Pt reports that she went to outside hospital and had negative MRA, negative non con ECHO. Chart review reveals she was here a week ago and had negative ocular US. Denies any other medical problems and denies taking any medication currently. Further denies fever, vomiting, abd pain, other sx.  Pt has neuro follow up on 7/2, ENT follow up on 7/12.    History obtained with  # [id number]

## 2020-06-22 NOTE — ED PROVIDER NOTE - PATIENT PORTAL LINK FT
You can access the FollowMyHealth Patient Portal offered by [facility] by registering at the following website: [email or web address]. By joining [employer]’s FollowMyHealth portal, you will also be able to view your health information using other applications (apps) compatible with our system. You can access the FollowMyHealth Patient Portal offered by [facility] by registering at the following website: [email or web address]. By joining [employer]’s FollowMyHealth portal, you will also be able to view your health information using other applications (apps) compatible with our system.

## 2020-06-22 NOTE — ED PROVIDER NOTE - NSFOLLOWUPINSTRUCTIONS_ED_ALL_ED_FT
Headache    A headache is pain or discomfort felt around the head or neck area. The specific cause of a headache may not be found as there are many types including tension headaches, migraine headaches, and cluster headaches. Watch your condition for any changes. Things you can do to manage your pain include taking over the counter and prescription medications as instructed by your health care provider, lying down in a dark quiet room, limiting stress, getting regular sleep, and refraining from alcohol and tobacco products.    SEEK IMMEDIATE MEDICAL CARE IF YOU HAVE ANY OF THE FOLLOWING SYMPTOMS: fever, vomiting, stiff neck, loss of vision, problems with speech, muscle weakness, loss of balance, trouble walking, passing out, or confusion.    - Follow up with your scheduled ENT and neurology appointments.   - Bring results with you to the appointment.   - Take tylenol 650mg or motrin 600mg every 6 hours for pain or fever.   - Return to the ED for new or worsening symptoms.

## 2020-06-22 NOTE — ED PROVIDER NOTE - CLINICAL SUMMARY MEDICAL DECISION MAKING FREE TEXT BOX
Papa Aguirre (MD): 34y F w/ sx concerning for peripheral vertigo vs occipital pathology. Has had normal MR MRA in April. Will repeat labs and treat symptomatically. Will consider CDU for MRV to r/o central venous thrombosis. 34y F w/ sx concerning for peripheral vertigo vs occipital pathology. Has had normal MR MRA in April. No neuro deficits, vision 20/20.  Will repeat labs and treat symptomatically. Will consider CT venogram to r/o sinus venous thrombosis.  Plan for labs, CTV, reglan, fluids and reassess.  - Fransisca Davis DO

## 2020-06-22 NOTE — ED PROVIDER NOTE - PROGRESS NOTE DETAILS
ID: [id number]    Patient reexamined, still endorsing nervousness and dizziness. Will try PO valium for symptoms. Updated on results and recommended follow up with neurology outpatient (either [facility] on 7/2 or our neuro clinic). Agreeable to plan. Vannessa Oconnor, PGY1

## 2020-06-24 LAB
-  AMIKACIN: SIGNIFICANT CHANGE UP
-  AMPICILLIN/SULBACTAM: SIGNIFICANT CHANGE UP
-  AMPICILLIN: SIGNIFICANT CHANGE UP
-  AZTREONAM: SIGNIFICANT CHANGE UP
-  CEFAZOLIN: SIGNIFICANT CHANGE UP
-  CEFEPIME: SIGNIFICANT CHANGE UP
-  CEFOXITIN: SIGNIFICANT CHANGE UP
-  CEFTRIAXONE: SIGNIFICANT CHANGE UP
-  CIPROFLOXACIN: SIGNIFICANT CHANGE UP
-  ERTAPENEM: SIGNIFICANT CHANGE UP
-  GENTAMICIN: SIGNIFICANT CHANGE UP
-  IMIPENEM: SIGNIFICANT CHANGE UP
-  LEVOFLOXACIN: SIGNIFICANT CHANGE UP
-  MEROPENEM: SIGNIFICANT CHANGE UP
-  NITROFURANTOIN: SIGNIFICANT CHANGE UP
-  PIPERACILLIN/TAZOBACTAM: SIGNIFICANT CHANGE UP
-  TIGECYCLINE: SIGNIFICANT CHANGE UP
-  TOBRAMYCIN: SIGNIFICANT CHANGE UP
-  TRIMETHOPRIM/SULFAMETHOXAZOLE: SIGNIFICANT CHANGE UP
CULTURE RESULTS: SIGNIFICANT CHANGE UP
METHOD TYPE: SIGNIFICANT CHANGE UP
ORGANISM # SPEC MICROSCOPIC CNT: SIGNIFICANT CHANGE UP
ORGANISM # SPEC MICROSCOPIC CNT: SIGNIFICANT CHANGE UP
SPECIMEN SOURCE: SIGNIFICANT CHANGE UP
SURGICAL PATHOLOGY STUDY: SIGNIFICANT CHANGE UP

## 2020-06-24 RX ORDER — CEFDINIR 250 MG/5ML
1 POWDER, FOR SUSPENSION ORAL
Qty: 20 | Refills: 0
Start: 2020-06-24 | End: 2020-07-03

## 2020-06-24 NOTE — ED POST DISCHARGE NOTE - RESULT SUMMARY
UCx GNR  50-99K not on antibiotics UCx GNR  50-99K not on antibiotics  /   6/25/2020: UCx 50-99k E.coli ESBL resistant to beta lactams, will require change in abx

## 2020-06-24 NOTE — ED POST DISCHARGE NOTE - DETAILS
6/24/2020: per chart review pt Greek speaking. Used  Norberto [id number]. Patient currently asymptomatic. Denies urinary symptoms, abdominal pain, nausea, vomiting, back pain, or fever. answered all questions - JANNA Day 6/24/2020: per chart review pt Cameroonian speaking. Used  Norberto [id number]. Patient currently asymptomatic. Denies urinary symptoms, abdominal pain, nausea, vomiting, back pain, or fever there will hold antibiotics. answered all questions - JANNA Day 6/24/2020: per chart review pt Martiniquais speaking. Used  Norberto [id number]. Patient currently asymptomatic. Denies urinary symptoms, abdominal pain, nausea, vomiting, back pain, or fever therefore will hold antibiotics. answered all questions - JANNA Day 6/24/2020: per chart review pt Cape Verdean speaking. Used  Norberto [id number]. Patient currently asymptomatic. Denies urinary symptoms, abdominal pain, nausea, vomiting, back pain, or fever. Patient reported a UTI in 12/2019 that cause her to be very "sick". Per chart review pt was altered required intubation found to be in septic shock 2/2 to pyelo and had ecoli bacteremia therefore will treat + UCx growing GNR with cefdinir 300mg BID preemptively pending sensitivities. Zachary Day 6/25/2020: called and d/w pt with  ID#[id number], pt had concerns about cost of new medication, I compared costs of appropriate abx, found [facility] coupon for Bactrim for $8.91, with pt permission I had the coupon code sent electronically to her phone from [employer]. Rx sent. Pt advised to DC previous abx and begin bactrim, finish full course, return to ED if f/c, difficulty urinating, abd pain, vomiting. -Ricky Howell PA-C

## 2020-06-25 ENCOUNTER — EMERGENCY (EMERGENCY)
Facility: HOSPITAL | Age: 35
LOS: 1 days | Discharge: HOME | End: 2020-06-25
Attending: EMERGENCY MEDICINE
Payer: MEDICAID

## 2020-06-25 VITALS
OXYGEN SATURATION: 99 % | HEIGHT: 62 IN | WEIGHT: 149.91 LBS | HEART RATE: 98 BPM | TEMPERATURE: 98 F | DIASTOLIC BLOOD PRESSURE: 53 MMHG | SYSTOLIC BLOOD PRESSURE: 137 MMHG | RESPIRATION RATE: 18 BRPM

## 2020-06-25 VITALS
HEART RATE: 82 BPM | OXYGEN SATURATION: 99 % | TEMPERATURE: 99 F | DIASTOLIC BLOOD PRESSURE: 75 MMHG | SYSTOLIC BLOOD PRESSURE: 107 MMHG | RESPIRATION RATE: 16 BRPM

## 2020-06-25 LAB
ALBUMIN SERPL ELPH-MCNC: 4.6 G/DL — SIGNIFICANT CHANGE UP (ref 3.3–5)
ALP SERPL-CCNC: 66 U/L — SIGNIFICANT CHANGE UP (ref 40–120)
ALT FLD-CCNC: 41 U/L — SIGNIFICANT CHANGE UP (ref 10–45)
ANION GAP SERPL CALC-SCNC: 13 MMOL/L — SIGNIFICANT CHANGE UP (ref 5–17)
APPEARANCE UR: CLEAR — SIGNIFICANT CHANGE UP
AST SERPL-CCNC: 28 U/L — SIGNIFICANT CHANGE UP (ref 10–40)
BACTERIA # UR AUTO: NEGATIVE — SIGNIFICANT CHANGE UP
BASOPHILS # BLD AUTO: 0.04 K/UL — SIGNIFICANT CHANGE UP (ref 0–0.2)
BASOPHILS NFR BLD AUTO: 0.7 % — SIGNIFICANT CHANGE UP (ref 0–2)
BILIRUB SERPL-MCNC: 0.3 MG/DL — SIGNIFICANT CHANGE UP (ref 0.2–1.2)
BILIRUB UR-MCNC: NEGATIVE — SIGNIFICANT CHANGE UP
BUN SERPL-MCNC: 8 MG/DL — SIGNIFICANT CHANGE UP (ref 7–23)
CALCIUM SERPL-MCNC: 10 MG/DL — SIGNIFICANT CHANGE UP (ref 8.4–10.5)
CHLORIDE SERPL-SCNC: 102 MMOL/L — SIGNIFICANT CHANGE UP (ref 96–108)
CO2 SERPL-SCNC: 23 MMOL/L — SIGNIFICANT CHANGE UP (ref 22–31)
COLOR SPEC: COLORLESS — SIGNIFICANT CHANGE UP
CREAT SERPL-MCNC: 0.73 MG/DL — SIGNIFICANT CHANGE UP (ref 0.5–1.3)
DIFF PNL FLD: NEGATIVE — SIGNIFICANT CHANGE UP
EOSINOPHIL # BLD AUTO: 0.04 K/UL — SIGNIFICANT CHANGE UP (ref 0–0.5)
EOSINOPHIL NFR BLD AUTO: 0.7 % — SIGNIFICANT CHANGE UP (ref 0–6)
EPI CELLS # UR: 1 /HPF — SIGNIFICANT CHANGE UP
GLUCOSE SERPL-MCNC: 118 MG/DL — HIGH (ref 70–99)
GLUCOSE UR QL: NEGATIVE — SIGNIFICANT CHANGE UP
HCT VFR BLD CALC: 39.3 % — SIGNIFICANT CHANGE UP (ref 34.5–45)
HGB BLD-MCNC: 13.7 G/DL — SIGNIFICANT CHANGE UP (ref 11.5–15.5)
IMM GRANULOCYTES NFR BLD AUTO: 0.3 % — SIGNIFICANT CHANGE UP (ref 0–1.5)
KETONES UR-MCNC: NEGATIVE — SIGNIFICANT CHANGE UP
LEUKOCYTE ESTERASE UR-ACNC: NEGATIVE — SIGNIFICANT CHANGE UP
LYMPHOCYTES # BLD AUTO: 1.97 K/UL — SIGNIFICANT CHANGE UP (ref 1–3.3)
LYMPHOCYTES # BLD AUTO: 32.5 % — SIGNIFICANT CHANGE UP (ref 13–44)
MAGNESIUM SERPL-MCNC: 2 MG/DL — SIGNIFICANT CHANGE UP (ref 1.6–2.6)
MCHC RBC-ENTMCNC: 33.1 PG — SIGNIFICANT CHANGE UP (ref 27–34)
MCHC RBC-ENTMCNC: 34.9 GM/DL — SIGNIFICANT CHANGE UP (ref 32–36)
MCV RBC AUTO: 94.9 FL — SIGNIFICANT CHANGE UP (ref 80–100)
MONOCYTES # BLD AUTO: 0.39 K/UL — SIGNIFICANT CHANGE UP (ref 0–0.9)
MONOCYTES NFR BLD AUTO: 6.4 % — SIGNIFICANT CHANGE UP (ref 2–14)
NEUTROPHILS # BLD AUTO: 3.61 K/UL — SIGNIFICANT CHANGE UP (ref 1.8–7.4)
NEUTROPHILS NFR BLD AUTO: 59.4 % — SIGNIFICANT CHANGE UP (ref 43–77)
NITRITE UR-MCNC: NEGATIVE — SIGNIFICANT CHANGE UP
NRBC # BLD: 0 /100 WBCS — SIGNIFICANT CHANGE UP (ref 0–0)
PH UR: 6.5 — SIGNIFICANT CHANGE UP (ref 5–8)
PHOSPHATE SERPL-MCNC: 2.8 MG/DL — SIGNIFICANT CHANGE UP (ref 2.5–4.5)
PLATELET # BLD AUTO: 208 K/UL — SIGNIFICANT CHANGE UP (ref 150–400)
POTASSIUM SERPL-MCNC: 3.6 MMOL/L — SIGNIFICANT CHANGE UP (ref 3.5–5.3)
POTASSIUM SERPL-SCNC: 3.6 MMOL/L — SIGNIFICANT CHANGE UP (ref 3.5–5.3)
PROT SERPL-MCNC: 6.9 G/DL — SIGNIFICANT CHANGE UP (ref 6–8.3)
PROT UR-MCNC: NEGATIVE — SIGNIFICANT CHANGE UP
RBC # BLD: 4.14 M/UL — SIGNIFICANT CHANGE UP (ref 3.8–5.2)
RBC # FLD: 12 % — SIGNIFICANT CHANGE UP (ref 10.3–14.5)
RBC CASTS # UR COMP ASSIST: 1 /HPF — SIGNIFICANT CHANGE UP (ref 0–4)
SODIUM SERPL-SCNC: 138 MMOL/L — SIGNIFICANT CHANGE UP (ref 135–145)
SP GR SPEC: 1.01 — LOW (ref 1.01–1.02)
UROBILINOGEN FLD QL: NEGATIVE — SIGNIFICANT CHANGE UP
WBC # BLD: 6.07 K/UL — SIGNIFICANT CHANGE UP (ref 3.8–10.5)
WBC # FLD AUTO: 6.07 K/UL — SIGNIFICANT CHANGE UP (ref 3.8–10.5)
WBC UR QL: 0 /HPF — SIGNIFICANT CHANGE UP (ref 0–5)

## 2020-06-25 PROCEDURE — 81001 URINALYSIS AUTO W/SCOPE: CPT

## 2020-06-25 PROCEDURE — 83735 ASSAY OF MAGNESIUM: CPT

## 2020-06-25 PROCEDURE — 99284 EMERGENCY DEPT VISIT MOD MDM: CPT | Mod: 25

## 2020-06-25 PROCEDURE — 80053 COMPREHEN METABOLIC PANEL: CPT

## 2020-06-25 PROCEDURE — 99284 EMERGENCY DEPT VISIT MOD MDM: CPT

## 2020-06-25 PROCEDURE — 85027 COMPLETE CBC AUTOMATED: CPT

## 2020-06-25 PROCEDURE — 84100 ASSAY OF PHOSPHORUS: CPT

## 2020-06-25 PROCEDURE — 96374 THER/PROPH/DIAG INJ IV PUSH: CPT

## 2020-06-25 PROCEDURE — 87086 URINE CULTURE/COLONY COUNT: CPT

## 2020-06-25 RX ORDER — ACETAMINOPHEN 500 MG
975 TABLET ORAL ONCE
Refills: 0 | Status: COMPLETED | OUTPATIENT
Start: 2020-06-25 | End: 2020-06-25

## 2020-06-25 RX ORDER — SODIUM CHLORIDE 9 MG/ML
1000 INJECTION INTRAMUSCULAR; INTRAVENOUS; SUBCUTANEOUS ONCE
Refills: 0 | Status: COMPLETED | OUTPATIENT
Start: 2020-06-25 | End: 2020-06-25

## 2020-06-25 RX ORDER — ONDANSETRON 8 MG/1
4 TABLET, FILM COATED ORAL ONCE
Refills: 0 | Status: COMPLETED | OUTPATIENT
Start: 2020-06-25 | End: 2020-06-25

## 2020-06-25 RX ORDER — ONDANSETRON 8 MG/1
1 TABLET, FILM COATED ORAL
Qty: 10 | Refills: 0
Start: 2020-06-25 | End: 2020-06-29

## 2020-06-25 RX ORDER — AZTREONAM 2 G
1 VIAL (EA) INJECTION
Qty: 14 | Refills: 0
Start: 2020-06-25 | End: 2020-07-01

## 2020-06-25 RX ADMIN — ONDANSETRON 4 MILLIGRAM(S): 8 TABLET, FILM COATED ORAL at 15:32

## 2020-06-25 RX ADMIN — SODIUM CHLORIDE 1000 MILLILITER(S): 9 INJECTION INTRAMUSCULAR; INTRAVENOUS; SUBCUTANEOUS at 15:32

## 2020-06-25 RX ADMIN — Medication 975 MILLIGRAM(S): at 15:32

## 2020-06-25 NOTE — ED ADULT TRIAGE NOTE - CHIEF COMPLAINT QUOTE
anxiety Denies SI RLQ pain C/o feeling nervous and dizzy Was seen Monday in Ed Pos antibodies for Covid

## 2020-06-25 NOTE — ED PROVIDER NOTE - OBJECTIVE STATEMENT
34F presenting with CC of abd pain (diffuse) associated with nausea. No vomiting, fevers, chills. Patient seen in the ED just a few days ago for weakness. States that the weakness is still present but not worse. Patient was called to be updated on urine culture results and sent Bactrim, has been taking it as prescribed, but has only gotten one dose so far. Denies dysuria, chest pain. Denies any leg pain/swelling, hemoptysis, history of DVT/PE, malignancy, hormone use, recent surgery, immobilization, or trauma.

## 2020-06-25 NOTE — ED ADULT NURSE NOTE - OBJECTIVE STATEMENT
pt 35 yo madeline pal speaking seen her recently for similar complaint states difficulty sleepin worry since covid diagnosis 2 mos ago pt low grade temp on arrival skin warm dry clear bilateral breath sounds no vomiting some nausea  phone by md for exam with labs sent as ordered iv fluids and zofran given pt on Bactrim started yesterday for urine culture results pt has taken 2 doses

## 2020-06-25 NOTE — ED ADULT NURSE REASSESSMENT NOTE - NS ED NURSE REASSESS COMMENT FT1
phone Jim [phone] used for discharge instructions and reccomended follow up to keep neuro appt as scheduled and number given for patients anxiety verbalized for meds as pt states clinic is not giving her appointment soon enough  gave her number for walk in psych care for continued anxiety pt with zofran prescribed and sent to pharmacy for nausea pt given copy of labs and verbalized understanding of all instructions vitals stable on discharge

## 2020-06-25 NOTE — ED PROVIDER NOTE - NS ED ROS FT
CONST: no fevers, no chills, no trauma  EYES: no pain, no visual disturbances  ENT: no sore throat, no epistaxis, no rhinorrhea, no hearing changes  CV: no chest pain, no palpitations, no orthopnea, no extremity pain or swelling  RESP: no shortness of breath, no cough, no sputum, no pleurisy, no wheezing  ABD: + abdominal pain, + nausea, no vomiting, no diarrhea, no black or bloody stool  : no dysuria, no hematuria, no frequency, no urgency  MSK: no back pain, no neck pain, no extremity pain  NEURO: no headache, no sensory disturbances, no focal weakness, no dizziness  HEME: no easy bleeding or bruising  SKIN: no diaphoresis, no rash

## 2020-06-25 NOTE — ED PROVIDER NOTE - PHYSICAL EXAMINATION
Const: Well-nourished, Well-developed, appearing stated age.  Eyes: PERRL, no conjunctival injection, and symmetrical lids.  HEENT: Head NCAT, no lesions. Atraumatic external nose and ears. +Dry MM.  Neck: Symmetric, trachea midline.   CVS: +S1/S2, Peripheral pulses 2+ and equal in all extremities.  RESP: Unlabored respiratory effort. Clear to auscultation bilaterally.  GI: Nontender/Nondistended in all 4 quadrants, No hepatosplenomegaly. No CVA tenderness b/l   MSK: Normocephalic/Atraumatic, Extremities w/o deformity or ttp or edema in b/l LE   Skin: Warm, dry and intact. No rashes or lesions.  Neuro: CNs II-XII grossly intact. Motor & Sensation grossly intact.  Psych: Awake, Alert, & Oriented (AAO) x3. Appropriate mood and affect.

## 2020-06-25 NOTE — ED PROVIDER NOTE - ATTENDING CONTRIBUTION TO CARE
Patient presenting for evaluation of malaise and abdominal pains.  Pains central upper abdomen and also  throughout lower abdomen.  No medications at home for pain.  Associated nausea, no vomiting.  Is on antibiotics for positive urine culture from prior Emergency Department visit.    Exam:  General: Patient well appearing, mildly tachycardic, mild fever otherwise vital signs within normal limits  HEENT: airway patent with moist mucous membranes  Cardiac: regular tachycardia with strong peripheral pulses  Respiratory: no respiratory distress  GI: abdomen soft, non tender, non distended  Neuro: no gross neurologic deficits  Skin: warm, well perfused  Psych: normal mood and affect    Patient presenting with abdominal pains not reproducible on exam, mildly tachycardic and febrile - given prior positive urine now only s/p 1 dose of antibiotics could be early pyelonephritis - plan for screening labs, repeat urine, intravenous fluids, re-evaluate.  Low pretest probability for surgical abdominal pathology requiring imaging given exam.

## 2020-06-25 NOTE — ED ADULT NURSE NOTE - CAS TRG GEN SKIN CONDITION
Warm
Implemented All Fall with Harm Risk Interventions:  Dubberly to call system. Call bell, personal items and telephone within reach. Instruct patient to call for assistance. Room bathroom lighting operational. Non-slip footwear when patient is off stretcher. Physically safe environment: no spills, clutter or unnecessary equipment. Stretcher in lowest position, wheels locked, appropriate side rails in place. Provide visual cue, wrist band, yellow gown, etc. Monitor gait and stability. Monitor for mental status changes and reorient to person, place, and time. Review medications for side effects contributing to fall risk. Reinforce activity limits and safety measures with patient and family. Provide visual clues: red socks.

## 2020-06-25 NOTE — ED PROVIDER NOTE - NSFOLLOWUPINSTRUCTIONS_ED_ALL_ED_FT
Please see attached information on UTI.     Please return to the ER for new of worsening pain, trouble urinating, vomiting.     Please follow up with your PCP in 7-10 days.     Please see your neurologist as discussed on 7/2/2020.    Please  your prescription of Zofran and take as directed .

## 2020-06-25 NOTE — ED PROVIDER NOTE - CLINICAL SUMMARY MEDICAL DECISION MAKING FREE TEXT BOX
34F presenting for abd pain PE: temp 100.2 rectal, tachycardic, non focal abd exam Ddx: Likely weakness and generalized abd pain from UTI Plan: Basic labs, urine, fluids, zofran.

## 2020-06-26 LAB
CULTURE RESULTS: SIGNIFICANT CHANGE UP
SPECIMEN SOURCE: SIGNIFICANT CHANGE UP

## 2020-07-07 ENCOUNTER — OUTPATIENT (OUTPATIENT)
Dept: OUTPATIENT SERVICES | Facility: HOSPITAL | Age: 35
LOS: 1 days | End: 2020-07-07
Payer: SELF-PAY

## 2020-07-07 ENCOUNTER — APPOINTMENT (OUTPATIENT)
Dept: GASTROENTEROLOGY | Facility: HOSPITAL | Age: 35
End: 2020-07-07

## 2020-07-07 DIAGNOSIS — R10.9 UNSPECIFIED ABDOMINAL PAIN: ICD-10-CM

## 2020-07-07 PROCEDURE — G0463: CPT

## 2020-07-07 RX ORDER — OMEPRAZOLE 40 MG/1
40 CAPSULE, DELAYED RELEASE ORAL
Qty: 30 | Refills: 5 | Status: DISCONTINUED | COMMUNITY
Start: 2019-10-08 | End: 2020-07-07

## 2020-07-07 NOTE — REASON FOR VISIT
[Follow-Up: _____] : a [unfilled] follow-up visit [Other Location: e.g. Home (Enter Location, City,State)___] : at [unfilled] [Home] : at home, [unfilled] , at the time of the visit. [[employer] ] : provided by [employer]   [Verbal consent obtained from patient] : the patient, [unfilled] [FreeTextEntry1] : [id number] [TWNoteComboBox1] : Ecuadorean

## 2020-07-07 NOTE — ASSESSMENT
[FreeTextEntry1] : Impression:\par \par 1) GERD: Resolved with dietary modifications off oral PPI. No gross abnormalities in esophagus and stomach on EGD from June 2020. Normal esophageal mucosa. Chronic inactive gastritis in antrum without H. pylori organisms.   \par 2) Globus sensation: Persistent but unexplained. Normal laryngoscopic exam by ENT in 2019. No goiter/thyroid mass on US neck. No mechanical obstruction on EGD in June 2020. \par \par Plan\par - continue dietary modifications (limit spice, tomatoes, coffee, alcohol), no meals 4 hours prior to bedtime\par - weight management\par - avoid NSAIDs\par - RTC as needed\par \par D/w Dr. Vaughan\par \par NILESH Sarah PGY5\par \par

## 2020-07-07 NOTE — REVIEW OF SYSTEMS
[As Noted in HPI] : as noted in HPI [Fever] : no fever [Chills] : no chills [Recent Weight Loss (___ Lbs)] : no recent weight loss [Sore Throat] : no sore throat [Chest Pain] : no chest pain [Shortness Of Breath] : no shortness of breath [Cough] : no cough [Dizziness] : no dizziness [Skin Lesions] : no skin lesions [Easy Bleeding] : no tendency for easy bleeding [Easy Bruising] : no tendency for easy bruising

## 2020-07-07 NOTE — END OF VISIT
[Time Spent: ___ minutes] : I have spent [unfilled] minutes of time on the encounter. [FreeTextEntry3] : As modified and discussed with patient\par MD LUCHO Aguirre FACPiedmont McDuffie\par Associate Professor of Medicine\par [facility] [employer] [facility] [employer]\par   [] : Fellow

## 2020-07-07 NOTE — PHYSICAL EXAM
[General Appearance - Alert] : alert [General Appearance - Well-Appearing] : healthy appearing [Sclera] : the sclera and conjunctiva were normal [] : no respiratory distress [Exaggerated Use Of Accessory Muscles For Inspiration] : no accessory muscle use [Neck Appearance] : the appearance of the neck was normal [Abdomen Soft] : soft [Skin Color & Pigmentation] : normal skin color and pigmentation [No Focal Deficits] : no focal deficits [Affect] : the affect was normal [FreeTextEntry1] : Obese

## 2020-07-07 NOTE — HISTORY OF PRESENT ILLNESS
[Home] : at home, [unfilled] , at the time of the visit. [Other Location: e.g. Home (Enter Location, City,State)___] : at [unfilled] [Partner] : partner [Verbal consent obtained from patient] : the patient, [unfilled] [Nausea] : denies nausea [Heartburn] : improved heartburn [Vomiting] : denies vomiting [Diarrhea] : denies diarrhea [Yellow Skin Or Eyes (Jaundice)] : denies jaundice [Abdominal Pain] : denies abdominal pain [_________] : Performed [unfilled] [GERD] : gastroesophageal reflux disease [Hiatus Hernia] : no hiatus hernia [de-identified] : \par \par  [Peptic Ulcer Disease] : no peptic ulcer disease [de-identified] : 34 year old woman with GERD, hypothyroidism, obesity, h/o septic shock from pyelonephritis (12/2019) with recent normal EGD in June 2020.\par \par After her EGD, she continued to have reflux, usually at night and after eating fatty foods. Symptoms are improved since eating dinner at least 5 hours prior to bedtime and eliminating known trigger foods: tea, coffee, soda, foods containing increased spice or tomatoes.\par \par Oral PPI was stopped after her normal EGD and since making dietary changes to improve her symptoms, she does not feel as though she needs it. She denies any difficulty swallowing foods at this time. She continues to admits to a globus sensation although EGD in June revealed no gross findings. Mid and distal esophageal biopsies were normal.  [de-identified] : Normal. Esophageal biopsies normal. Gastric biopsy show chronic inactive gastritis.

## 2020-07-08 DIAGNOSIS — R13.10 DYSPHAGIA, UNSPECIFIED: ICD-10-CM

## 2020-07-08 DIAGNOSIS — E66.9 OBESITY, UNSPECIFIED: ICD-10-CM

## 2020-07-08 DIAGNOSIS — K21.9 GASTRO-ESOPHAGEAL REFLUX DISEASE WITHOUT ESOPHAGITIS: ICD-10-CM

## 2020-07-08 DIAGNOSIS — R09.89 OTHER SPECIFIED SYMPTOMS AND SIGNS INVOLVING THE CIRCULATORY AND RESPIRATORY SYSTEMS: ICD-10-CM

## 2020-07-09 ENCOUNTER — APPOINTMENT (OUTPATIENT)
Dept: NEUROLOGY | Facility: HOSPITAL | Age: 35
End: 2020-07-09

## 2020-07-09 VITALS
HEART RATE: 93 BPM | BODY MASS INDEX: 29.63 KG/M2 | RESPIRATION RATE: 14 BRPM | DIASTOLIC BLOOD PRESSURE: 80 MMHG | HEIGHT: 62 IN | SYSTOLIC BLOOD PRESSURE: 117 MMHG | TEMPERATURE: 100.4 F | WEIGHT: 161 LBS

## 2020-07-09 RX ORDER — MECLIZINE HYDROCHLORIDE 25 MG/1
25 TABLET ORAL 3 TIMES DAILY
Qty: 42 | Refills: 0 | Status: DISCONTINUED | COMMUNITY
Start: 2020-05-05 | End: 2020-07-09

## 2020-07-09 RX ORDER — SUMATRIPTAN 50 MG/1
50 TABLET, FILM COATED ORAL
Qty: 30 | Refills: 1 | Status: DISCONTINUED | COMMUNITY
Start: 2020-06-10 | End: 2020-07-09

## 2020-07-13 LAB
SARS-COV-2 IGG SERPL IA-ACNC: 4.65 RATIO
SARS-COV-2 IGG SERPL QL IA: POSITIVE

## 2020-07-14 ENCOUNTER — OUTPATIENT (OUTPATIENT)
Dept: OUTPATIENT SERVICES | Facility: HOSPITAL | Age: 35
LOS: 1 days | Discharge: HOME | End: 2020-07-14

## 2020-07-14 ENCOUNTER — APPOINTMENT (OUTPATIENT)
Dept: OTOLARYNGOLOGY | Facility: CLINIC | Age: 35
End: 2020-07-14
Payer: COMMERCIAL

## 2020-07-14 PROCEDURE — 92557 COMPREHENSIVE HEARING TEST: CPT

## 2020-07-14 PROCEDURE — 92567 TYMPANOMETRY: CPT

## 2020-07-14 PROCEDURE — 99214 OFFICE O/P EST MOD 30 MIN: CPT | Mod: 25

## 2020-07-14 NOTE — PHYSICAL EXAM
[Rinne Test Air Conduction Persists > Bone Conduction Right] : air conduction greater than bone conduction on the right [Rinne Test Air Conduction Persists > Bone Conduction Left] : air conduction greater than bone conduction on the left [Hearing Sheikh Test (Tuning Fork On Forehead)] : no lateralization of tone [Fukuda Step Test] : Fukuda Step Test was Positive [Normal] : orientation to person, place, and time: normal [Hearing Loss Right Only] : normal [Hearing Loss Left Only] : normal [Nystagmus] : ~T no ~M nystagmus was seen [Romberg's Sign] : Romberg's sign was absent [Fistula Sign] : Fistula Sign: Negative [Past-Pointing] : Past-Pointing: Negative [Orion-Hallenmanuelke] : [place]-Hallpike: Negative [FreeTextEntry1] : Fukrosalba to kendra

## 2020-07-14 NOTE — HISTORY OF PRESENT ILLNESS
[de-identified] : 35 y/o female with Covid Dx back in March.  At that time Pt, with c/o b/l otalgia, and dizziness that persists today.  Pt. presented to the ED multiple times and had MRI's that were normal.  Dizziness episodes not associated with nausea and vomiting.  Pt. describes dizziness as disequilibrium feeling that is present all day.  Denies any room spinning.  Pt. denies any otorrhea, tinnitus, or headaches. feels like she will faint especially after working for a while. visual field seems to shift back and forth, can't look at phone.

## 2020-07-22 ENCOUNTER — LABORATORY RESULT (OUTPATIENT)
Age: 35
End: 2020-07-22

## 2020-07-22 ENCOUNTER — OUTPATIENT (OUTPATIENT)
Dept: OUTPATIENT SERVICES | Facility: HOSPITAL | Age: 35
LOS: 1 days | End: 2020-07-22
Payer: SELF-PAY

## 2020-07-22 ENCOUNTER — APPOINTMENT (OUTPATIENT)
Dept: INTERNAL MEDICINE | Facility: CLINIC | Age: 35
End: 2020-07-22

## 2020-07-22 VITALS
WEIGHT: 160 LBS | SYSTOLIC BLOOD PRESSURE: 120 MMHG | HEIGHT: 62 IN | BODY MASS INDEX: 29.44 KG/M2 | DIASTOLIC BLOOD PRESSURE: 80 MMHG

## 2020-07-22 DIAGNOSIS — E66.9 OBESITY, UNSPECIFIED: ICD-10-CM

## 2020-07-22 DIAGNOSIS — K21.9 GASTRO-ESOPHAGEAL REFLUX DISEASE WITHOUT ESOPHAGITIS: ICD-10-CM

## 2020-07-22 DIAGNOSIS — R13.10 DYSPHAGIA, UNSPECIFIED: ICD-10-CM

## 2020-07-22 DIAGNOSIS — Z87.19 PERSONAL HISTORY OF OTHER DISEASES OF THE DIGESTIVE SYSTEM: ICD-10-CM

## 2020-07-22 DIAGNOSIS — R09.89 OTHER SPECIFIED SYMPTOMS AND SIGNS INVOLVING THE CIRCULATORY AND RESPIRATORY SYSTEMS: ICD-10-CM

## 2020-07-22 DIAGNOSIS — Z00.00 ENCOUNTER FOR GENERAL ADULT MEDICAL EXAMINATION WITHOUT ABNORMAL FINDINGS: ICD-10-CM

## 2020-07-22 DIAGNOSIS — I10 ESSENTIAL (PRIMARY) HYPERTENSION: ICD-10-CM

## 2020-07-22 DIAGNOSIS — Z09 ENCOUNTER FOR FOLLOW-UP EXAMINATION AFTER COMPLETED TREATMENT FOR CONDITIONS OTHER THAN MALIGNANT NEOPLASM: ICD-10-CM

## 2020-07-22 DIAGNOSIS — R42 DIZZINESS AND GIDDINESS: ICD-10-CM

## 2020-07-22 LAB
CHOLEST SERPL-MCNC: 225 MG/DL — HIGH (ref 10–199)
HDLC SERPL-MCNC: 64 MG/DL — SIGNIFICANT CHANGE UP
LIPID PNL WITH DIRECT LDL SERPL: 119 MG/DL — HIGH
TOTAL CHOLESTEROL/HDL RATIO MEASUREMENT: 3.5 RATIO — SIGNIFICANT CHANGE UP (ref 3.3–7.1)
TRIGL SERPL-MCNC: 211 MG/DL — HIGH (ref 10–149)

## 2020-07-22 PROCEDURE — 83036 HEMOGLOBIN GLYCOSYLATED A1C: CPT

## 2020-07-22 PROCEDURE — G0463: CPT

## 2020-07-22 PROCEDURE — 80061 LIPID PANEL: CPT

## 2020-07-23 DIAGNOSIS — R42 DIZZINESS AND GIDDINESS: ICD-10-CM

## 2020-07-23 DIAGNOSIS — H53.8 OTHER VISUAL DISTURBANCES: ICD-10-CM

## 2020-07-23 PROBLEM — Z09 HOSPITAL DISCHARGE FOLLOW-UP: Status: ACTIVE | Noted: 2020-01-09

## 2020-07-23 PROBLEM — Z87.19 HISTORY OF DYSPHAGIA: Status: RESOLVED | Noted: 2019-10-01 | Resolved: 2020-07-23

## 2020-07-23 LAB
A1C WITH ESTIMATED AVERAGE GLUCOSE RESULT: 5 % — SIGNIFICANT CHANGE UP (ref 4–5.6)
ESTIMATED AVERAGE GLUCOSE: 97 MG/DL — SIGNIFICANT CHANGE UP (ref 68–114)

## 2020-07-23 NOTE — PHYSICAL EXAM
[Normal] : no rash [No Acute Distress] : no acute distress [Well Nourished] : well nourished [Normal Sclera/Conjunctiva] : normal sclera/conjunctiva [No Lymphadenopathy] : no lymphadenopathy [Supple] : supple [No Respiratory Distress] : no respiratory distress  [No Accessory Muscle Use] : no accessory muscle use [Clear to Auscultation] : lungs were clear to auscultation bilaterally [Normal Rate] : normal rate  [Regular Rhythm] : with a regular rhythm [Normal Posterior Cervical Nodes] : no posterior cervical lymphadenopathy [Normal Anterior Cervical Nodes] : no anterior cervical lymphadenopathy [Coordination Grossly Intact] : coordination grossly intact [No Focal Deficits] : no focal deficits [Normal Affect] : the affect was normal [Normal Insight/Judgement] : insight and judgment were intact [FreeTextEntry1] : Mild pharyngeal erythema

## 2020-07-23 NOTE — HISTORY OF PRESENT ILLNESS
[FreeTextEntry1] : 35 y.o F w/ no PMH presents for CPE and blurry vision/headache.  [de-identified] : 35 y.o F w/ no PMH presents for CPE. Patient is also complaining of dizziness, blurry vision and headache that started 4 months ago. 4 months ago patient developed COVID symptoms and has had these complaints ever since. Patient states she feels like room is spinning, improved when she is resting. Her vision gets blurred multiple times throughout the day, worsening with sunlight and sometimes her vision is darkened. Her headache is sometimes unilateral or bilateral, unable to categorize the type of pain, but denies any association with nausea or vomiting. Of note patient tested negative for PCR but tested positive for COVID antibodies. Patient is being seen by ENT and has a follow up on Friday 7/24. Patient had a neurology appointment but was unable to be seen because she had a fever in the clinic. The temperature resolved when she came to urgent care. \par \par Patient is also complaining of sore throat and reflux symptoms for 1 year. She is being followed by GI, her symptoms have not improved with omeprazole so that was discontinued. Endoscopy done on 06/2020 showed no abnormalities.

## 2020-07-23 NOTE — ASSESSMENT
[FreeTextEntry1] : 35 y.o F w/ no PMH presents for CPE. Patient is also complaining of dizziness, blurry vision and headache that started 4 months ago after having COVID infection with normal CT head, MRI brain, and MRA. \par \par Patient is being seen by ENT with recommended additional testing by ENT and has a follow up on Friday 7/24. Patient had a neurology appointment but was unable to be seen because she had a fever in the clinic. The temperature resolved when she came to urgent care. She now has appt schedule with neurology in August\par \par #Dizziness\par --followed by ENT, f/u ENT for further testing on Friday 7/24\par --followed by Neurology, patient has an appointment next month\par \par #HCM\par --f/u A1C, Lipid Profile\par \par RTC after ENT and Neurology follow up in 3 months

## 2020-07-23 NOTE — DATA REVIEWED
[FreeTextEntry1] : She had a visit at [facility] in May for these symptoms and  the imaging and lab results were reviewed. No abnormalities on cbc, cmp, tsh, CT HEAD, mra brain and mra neck. She had a developmentally hypoplastic r. vertebral artery otherwise the imaging report noted no abnormalities. MRI breain without contrast was also normal. CXR was also normal. Echocardiography done for syncope/collapse showed normal LV, EF 55-60%, RV normmal. Mild mitral valve regurgitation.

## 2020-07-23 NOTE — END OF VISIT
[] : Resident [FreeTextEntry3] : 35/F with dizziness and headache after covid infection. Concern for post covid neurologic issues which according to literature some cases are treated with steroids. Imaging does not show any abnormalities and patient would benefit from evaluation by neurology for further guidance of treatment. Recommended f/u with neurology and ENT and will f/u in clinic after pt completes those visits. She was otherwise well appearing today and is up to date on most health care maintenance items. Pt had lost weight due to COVID infection and is having difficulty exercising with social distancing guidelines and her symptoms of dizziness and headache. Will continue to reinforce healthy diet and exercise with more emphasis in next visit as she is currently dealing with more urgent health issues.

## 2020-07-24 ENCOUNTER — APPOINTMENT (OUTPATIENT)
Dept: OTOLARYNGOLOGY | Facility: CLINIC | Age: 35
End: 2020-07-24
Payer: COMMERCIAL

## 2020-07-24 PROCEDURE — 92537 CALORIC VSTBLR TEST W/REC: CPT | Mod: 26

## 2020-07-24 PROCEDURE — 92567 TYMPANOMETRY: CPT

## 2020-07-24 PROCEDURE — 92540 BASIC VESTIBULAR EVALUATION: CPT | Mod: 26

## 2020-07-24 PROCEDURE — 92700A: CUSTOM

## 2020-07-28 ENCOUNTER — EMERGENCY (EMERGENCY)
Facility: HOSPITAL | Age: 35
LOS: 1 days | Discharge: HOME | End: 2020-07-28
Attending: EMERGENCY MEDICINE
Payer: MEDICAID

## 2020-07-28 VITALS
DIASTOLIC BLOOD PRESSURE: 74 MMHG | TEMPERATURE: 98 F | HEART RATE: 78 BPM | SYSTOLIC BLOOD PRESSURE: 114 MMHG | RESPIRATION RATE: 18 BRPM | OXYGEN SATURATION: 98 %

## 2020-07-28 VITALS
TEMPERATURE: 99 F | RESPIRATION RATE: 16 BRPM | HEIGHT: 62 IN | SYSTOLIC BLOOD PRESSURE: 130 MMHG | HEART RATE: 82 BPM | DIASTOLIC BLOOD PRESSURE: 85 MMHG | OXYGEN SATURATION: 97 % | WEIGHT: 160.06 LBS

## 2020-07-28 LAB
ALBUMIN SERPL ELPH-MCNC: 5.1 G/DL — HIGH (ref 3.3–5)
ALP SERPL-CCNC: 80 U/L — SIGNIFICANT CHANGE UP (ref 40–120)
ALT FLD-CCNC: 36 U/L — SIGNIFICANT CHANGE UP (ref 10–45)
ANION GAP SERPL CALC-SCNC: 16 MMOL/L — SIGNIFICANT CHANGE UP (ref 5–17)
APPEARANCE UR: CLEAR — SIGNIFICANT CHANGE UP
AST SERPL-CCNC: 27 U/L — SIGNIFICANT CHANGE UP (ref 10–40)
BASOPHILS # BLD AUTO: 0.03 K/UL — SIGNIFICANT CHANGE UP (ref 0–0.2)
BASOPHILS NFR BLD AUTO: 0.4 % — SIGNIFICANT CHANGE UP (ref 0–2)
BILIRUB SERPL-MCNC: 0.3 MG/DL — SIGNIFICANT CHANGE UP (ref 0.2–1.2)
BILIRUB UR-MCNC: NEGATIVE — SIGNIFICANT CHANGE UP
BUN SERPL-MCNC: 10 MG/DL — SIGNIFICANT CHANGE UP (ref 7–23)
CALCIUM SERPL-MCNC: 10.4 MG/DL — SIGNIFICANT CHANGE UP (ref 8.4–10.5)
CHLORIDE SERPL-SCNC: 103 MMOL/L — SIGNIFICANT CHANGE UP (ref 96–108)
CO2 SERPL-SCNC: 24 MMOL/L — SIGNIFICANT CHANGE UP (ref 22–31)
COLOR SPEC: SIGNIFICANT CHANGE UP
CREAT SERPL-MCNC: 0.71 MG/DL — SIGNIFICANT CHANGE UP (ref 0.5–1.3)
DIFF PNL FLD: NEGATIVE — SIGNIFICANT CHANGE UP
EOSINOPHIL # BLD AUTO: 0.05 K/UL — SIGNIFICANT CHANGE UP (ref 0–0.5)
EOSINOPHIL NFR BLD AUTO: 0.7 % — SIGNIFICANT CHANGE UP (ref 0–6)
GLUCOSE SERPL-MCNC: 94 MG/DL — SIGNIFICANT CHANGE UP (ref 70–99)
GLUCOSE UR QL: NEGATIVE — SIGNIFICANT CHANGE UP
HCT VFR BLD CALC: 42 % — SIGNIFICANT CHANGE UP (ref 34.5–45)
HGB BLD-MCNC: 14.7 G/DL — SIGNIFICANT CHANGE UP (ref 11.5–15.5)
HIV 1 & 2 AB SERPL IA.RAPID: SIGNIFICANT CHANGE UP
IMM GRANULOCYTES NFR BLD AUTO: 0.3 % — SIGNIFICANT CHANGE UP (ref 0–1.5)
KETONES UR-MCNC: NEGATIVE — SIGNIFICANT CHANGE UP
LEUKOCYTE ESTERASE UR-ACNC: NEGATIVE — SIGNIFICANT CHANGE UP
LYMPHOCYTES # BLD AUTO: 2.7 K/UL — SIGNIFICANT CHANGE UP (ref 1–3.3)
LYMPHOCYTES # BLD AUTO: 36.3 % — SIGNIFICANT CHANGE UP (ref 13–44)
MCHC RBC-ENTMCNC: 34 PG — SIGNIFICANT CHANGE UP (ref 27–34)
MCHC RBC-ENTMCNC: 35 GM/DL — SIGNIFICANT CHANGE UP (ref 32–36)
MCV RBC AUTO: 97.2 FL — SIGNIFICANT CHANGE UP (ref 80–100)
MONOCYTES # BLD AUTO: 0.46 K/UL — SIGNIFICANT CHANGE UP (ref 0–0.9)
MONOCYTES NFR BLD AUTO: 6.2 % — SIGNIFICANT CHANGE UP (ref 2–14)
NEUTROPHILS # BLD AUTO: 4.17 K/UL — SIGNIFICANT CHANGE UP (ref 1.8–7.4)
NEUTROPHILS NFR BLD AUTO: 56.1 % — SIGNIFICANT CHANGE UP (ref 43–77)
NITRITE UR-MCNC: NEGATIVE — SIGNIFICANT CHANGE UP
NRBC # BLD: 0 /100 WBCS — SIGNIFICANT CHANGE UP (ref 0–0)
PH UR: 6.5 — SIGNIFICANT CHANGE UP (ref 5–8)
PLATELET # BLD AUTO: 205 K/UL — SIGNIFICANT CHANGE UP (ref 150–400)
POTASSIUM SERPL-MCNC: 3.8 MMOL/L — SIGNIFICANT CHANGE UP (ref 3.5–5.3)
POTASSIUM SERPL-SCNC: 3.8 MMOL/L — SIGNIFICANT CHANGE UP (ref 3.5–5.3)
PROT SERPL-MCNC: 7.9 G/DL — SIGNIFICANT CHANGE UP (ref 6–8.3)
PROT UR-MCNC: NEGATIVE — SIGNIFICANT CHANGE UP
RBC # BLD: 4.32 M/UL — SIGNIFICANT CHANGE UP (ref 3.8–5.2)
RBC # FLD: 11.9 % — SIGNIFICANT CHANGE UP (ref 10.3–14.5)
SODIUM SERPL-SCNC: 143 MMOL/L — SIGNIFICANT CHANGE UP (ref 135–145)
SP GR SPEC: 1.01 — SIGNIFICANT CHANGE UP (ref 1.01–1.02)
UROBILINOGEN FLD QL: NEGATIVE — SIGNIFICANT CHANGE UP
WBC # BLD: 7.43 K/UL — SIGNIFICANT CHANGE UP (ref 3.8–10.5)
WBC # FLD AUTO: 7.43 K/UL — SIGNIFICANT CHANGE UP (ref 3.8–10.5)

## 2020-07-28 PROCEDURE — 74177 CT ABD & PELVIS W/CONTRAST: CPT

## 2020-07-28 PROCEDURE — 81003 URINALYSIS AUTO W/O SCOPE: CPT

## 2020-07-28 PROCEDURE — 85027 COMPLETE CBC AUTOMATED: CPT

## 2020-07-28 PROCEDURE — 99285 EMERGENCY DEPT VISIT HI MDM: CPT

## 2020-07-28 PROCEDURE — 99284 EMERGENCY DEPT VISIT MOD MDM: CPT

## 2020-07-28 PROCEDURE — 80053 COMPREHEN METABOLIC PANEL: CPT

## 2020-07-28 PROCEDURE — 86703 HIV-1/HIV-2 1 RESULT ANTBDY: CPT

## 2020-07-28 PROCEDURE — 84702 CHORIONIC GONADOTROPIN TEST: CPT

## 2020-07-28 PROCEDURE — 87086 URINE CULTURE/COLONY COUNT: CPT

## 2020-07-28 PROCEDURE — 74177 CT ABD & PELVIS W/CONTRAST: CPT | Mod: 26

## 2020-07-28 RX ORDER — SODIUM CHLORIDE 9 MG/ML
1000 INJECTION INTRAMUSCULAR; INTRAVENOUS; SUBCUTANEOUS ONCE
Refills: 0 | Status: COMPLETED | OUTPATIENT
Start: 2020-07-28 | End: 2020-07-28

## 2020-07-28 RX ADMIN — SODIUM CHLORIDE 1000 MILLILITER(S): 9 INJECTION INTRAMUSCULAR; INTRAVENOUS; SUBCUTANEOUS at 19:16

## 2020-07-28 NOTE — ED PROVIDER NOTE - OBJECTIVE STATEMENT
35 year old with hx of UTI 1 month ago presenting to ED for 4 days of lower abdominal pain radiating to the flanks. patient states she was seen 1 month ago and was dx with UTI patient completed abx regiment. 35 year old with hx of UTI 1 month ago presenting to ED for 4 days of lower abdominal pain radiating to the flanks. patient states she was seen 1 month ago and was dx with UTI patient completed abx regiment and was asymptomatic. patient today presents with Lower quadrant abdominal pain that radiates to the back patient denies and exacerbating factors. 35 year old with hx of UTI 1 month ago presenting to ED for 4 days of lower abdominal pain radiating to the flanks. patient states she was seen 1 month ago and was dx with UTI patient completed abx regiment and was asymptomatic. patient today presents with Lower quadrant abdominal pain that radiates to the back patient denies and exacerbating factors. patient denies any hematuria, post prandial pain, change in urine frequency.

## 2020-07-28 NOTE — ED PROVIDER NOTE - PATIENT PORTAL LINK FT
You can access the FollowMyHealth Patient Portal offered by [facility] by registering at the following website: [email or web address]. By joining [employer]’s FollowMyHealth portal, you will also be able to view your health information using other applications (apps) compatible with our system.

## 2020-07-28 NOTE — ED PROVIDER NOTE - INTERNATIONAL TRAVEL
Pt presents to triage with complaints of headache and blurry vision. He reports he has had this problem for a couple months. . He did meth tonight.    No

## 2020-07-28 NOTE — ED ADULT NURSE REASSESSMENT NOTE - NS ED NURSE REASSESS COMMENT FT1
Received report from Yamilex JIMÉNEZ. Patient resting comfortably in stretcher. A&Ox4. Patient in no acute distress. Patient denies abdominal pain, fever/chills, chest pain, SOB currently. Patient pending results CT scan of abdomen and pelvis. Plan of care discussed. Safety and comfort measures maintained.

## 2020-07-28 NOTE — ED PROVIDER NOTE - NSFOLLOWUPINSTRUCTIONS_ED_ALL_ED_FT
There are no signs of emergency conditions requiring admission to the hospital on today's workup.  Based on the evaluation, a presumptive diagnosis was made, however, further evaluation may be required by your primary care physician or a specialist for a more definitive diagnosis.  Therefore, please follow-up as directed or return to the Emergency Department if your symptoms change or worsen.    We recommend that you:  1. See your primary care physician within the next 72 hours for follow up.  Bring a copy of your discharge paperwork (including any test results) to your doctor.  2. Please take Tylenol as needed for pain  3. Please follow up with Gastroenterology clinic within 24-48 hours         *** Return immediately if you have severe pain, chest pain, shortness of breath, bleeding, vomiting, syncope,  or any other new/concerning symptoms. ***

## 2020-07-29 LAB
CULTURE RESULTS: SIGNIFICANT CHANGE UP
SPECIMEN SOURCE: SIGNIFICANT CHANGE UP

## 2020-07-30 ENCOUNTER — APPOINTMENT (OUTPATIENT)
Dept: OTOLARYNGOLOGY | Facility: CLINIC | Age: 35
End: 2020-07-30
Payer: COMMERCIAL

## 2020-07-30 VITALS
HEIGHT: 62 IN | SYSTOLIC BLOOD PRESSURE: 112 MMHG | DIASTOLIC BLOOD PRESSURE: 74 MMHG | TEMPERATURE: 98 F | BODY MASS INDEX: 29.44 KG/M2 | HEART RATE: 75 BPM | WEIGHT: 160 LBS

## 2020-07-30 PROCEDURE — 99214 OFFICE O/P EST MOD 30 MIN: CPT

## 2020-07-31 NOTE — PHYSICAL EXAM
[Normal] : the right mastoid was normal [Hearing Loss Left Only] : normal [Hearing Loss Right Only] : normal [Hearing Sheikh Test (Tuning Fork On Forehead)] : no lateralization of tone [Rinne Test Air Conduction Persists > Bone Conduction Right] : air conduction greater than bone conduction on the right [Rinne Test Air Conduction Persists > Bone Conduction Left] : air conduction greater than bone conduction on the left [Fukuda Step Test] : Fukuda Step Test was Positive [Romberg's Sign] : Romberg's sign was absent [Nystagmus] : ~T no ~M nystagmus was seen [Fistula Sign] : Fistula Sign: Negative [Orion-Hallenmanuelke] : Marion-Hallpike: Negative [Past-Pointing] : Past-Pointing: Negative [FreeTextEntry1] : Fukrosalba to kendra

## 2020-07-31 NOTE — REASON FOR VISIT
[Subsequent Evaluation] : a subsequent evaluation for [[employer] ] : provided by [employer]   [FreeTextEntry2] : Joann [FreeTextEntry1] : [id number] [TWNoteComboBox1] : Wallisian

## 2020-07-31 NOTE — HISTORY OF PRESENT ILLNESS
[de-identified] : f/u dizziness\par had VNG here to review\par pt does suffer from migraines\par she was ok for 2 mos but last night had an episode\par was scrolling on her phone, sitting, got up and went to kitchen\par then felt a black out and nearly fainted\par called out to her \par couple minutes duration\par sometimes her visual field seems like it is swimming\par

## 2020-08-01 ENCOUNTER — EMERGENCY (EMERGENCY)
Facility: HOSPITAL | Age: 35
LOS: 1 days | Discharge: HOME | End: 2020-08-01
Attending: EMERGENCY MEDICINE
Payer: MEDICAID

## 2020-08-01 VITALS
SYSTOLIC BLOOD PRESSURE: 114 MMHG | DIASTOLIC BLOOD PRESSURE: 78 MMHG | WEIGHT: 160.06 LBS | HEART RATE: 79 BPM | RESPIRATION RATE: 16 BRPM | OXYGEN SATURATION: 100 % | HEIGHT: 62 IN | TEMPERATURE: 98 F

## 2020-08-01 DIAGNOSIS — F43.22 ADJUSTMENT DISORDER WITH ANXIETY: ICD-10-CM

## 2020-08-01 LAB
ALBUMIN SERPL ELPH-MCNC: 4.5 G/DL — SIGNIFICANT CHANGE UP (ref 3.3–5)
ALP SERPL-CCNC: 71 U/L — SIGNIFICANT CHANGE UP (ref 40–120)
ALT FLD-CCNC: 33 U/L — SIGNIFICANT CHANGE UP (ref 10–45)
ANION GAP SERPL CALC-SCNC: 16 MMOL/L — SIGNIFICANT CHANGE UP (ref 5–17)
APAP SERPL-MCNC: <15 UG/ML — SIGNIFICANT CHANGE UP (ref 10–30)
APPEARANCE UR: CLEAR — SIGNIFICANT CHANGE UP
AST SERPL-CCNC: 22 U/L — SIGNIFICANT CHANGE UP (ref 10–40)
BACTERIA # UR AUTO: NEGATIVE — SIGNIFICANT CHANGE UP
BILIRUB SERPL-MCNC: 0.4 MG/DL — SIGNIFICANT CHANGE UP (ref 0.2–1.2)
BILIRUB UR-MCNC: NEGATIVE — SIGNIFICANT CHANGE UP
BUN SERPL-MCNC: 8 MG/DL — SIGNIFICANT CHANGE UP (ref 7–23)
CALCIUM SERPL-MCNC: 9.8 MG/DL — SIGNIFICANT CHANGE UP (ref 8.4–10.5)
CHLORIDE SERPL-SCNC: 103 MMOL/L — SIGNIFICANT CHANGE UP (ref 96–108)
CO2 SERPL-SCNC: 22 MMOL/L — SIGNIFICANT CHANGE UP (ref 22–31)
COLOR SPEC: COLORLESS — SIGNIFICANT CHANGE UP
CREAT SERPL-MCNC: 0.61 MG/DL — SIGNIFICANT CHANGE UP (ref 0.5–1.3)
DIFF PNL FLD: NEGATIVE — SIGNIFICANT CHANGE UP
EPI CELLS # UR: 0 /HPF — SIGNIFICANT CHANGE UP
ETHANOL SERPL-MCNC: SIGNIFICANT CHANGE UP MG/DL (ref 0–10)
GLUCOSE SERPL-MCNC: 99 MG/DL — SIGNIFICANT CHANGE UP (ref 70–99)
GLUCOSE UR QL: NEGATIVE — SIGNIFICANT CHANGE UP
HCG UR QL: NEGATIVE — SIGNIFICANT CHANGE UP
HCT VFR BLD CALC: 41.4 % — SIGNIFICANT CHANGE UP (ref 34.5–45)
HGB BLD-MCNC: 14 G/DL — SIGNIFICANT CHANGE UP (ref 11.5–15.5)
HYALINE CASTS # UR AUTO: 0 /LPF — SIGNIFICANT CHANGE UP (ref 0–2)
KETONES UR-MCNC: NEGATIVE — SIGNIFICANT CHANGE UP
LEUKOCYTE ESTERASE UR-ACNC: NEGATIVE — SIGNIFICANT CHANGE UP
MCHC RBC-ENTMCNC: 32.8 PG — SIGNIFICANT CHANGE UP (ref 27–34)
MCHC RBC-ENTMCNC: 33.8 GM/DL — SIGNIFICANT CHANGE UP (ref 32–36)
MCV RBC AUTO: 97 FL — SIGNIFICANT CHANGE UP (ref 80–100)
NITRITE UR-MCNC: NEGATIVE — SIGNIFICANT CHANGE UP
NRBC # BLD: 0 /100 WBCS — SIGNIFICANT CHANGE UP (ref 0–0)
PH UR: 7.5 — SIGNIFICANT CHANGE UP (ref 5–8)
PLATELET # BLD AUTO: 190 K/UL — SIGNIFICANT CHANGE UP (ref 150–400)
POTASSIUM SERPL-MCNC: 4.2 MMOL/L — SIGNIFICANT CHANGE UP (ref 3.5–5.3)
POTASSIUM SERPL-SCNC: 4.2 MMOL/L — SIGNIFICANT CHANGE UP (ref 3.5–5.3)
PROT SERPL-MCNC: 7.1 G/DL — SIGNIFICANT CHANGE UP (ref 6–8.3)
PROT UR-MCNC: NEGATIVE — SIGNIFICANT CHANGE UP
RBC # BLD: 4.27 M/UL — SIGNIFICANT CHANGE UP (ref 3.8–5.2)
RBC # FLD: 11.8 % — SIGNIFICANT CHANGE UP (ref 10.3–14.5)
RBC CASTS # UR COMP ASSIST: 1 /HPF — SIGNIFICANT CHANGE UP (ref 0–4)
SALICYLATES SERPL-MCNC: <2 MG/DL — LOW (ref 15–30)
SARS-COV-2 RNA SPEC QL NAA+PROBE: SIGNIFICANT CHANGE UP
SODIUM SERPL-SCNC: 141 MMOL/L — SIGNIFICANT CHANGE UP (ref 135–145)
SP GR SPEC: 1 — LOW (ref 1.01–1.02)
TSH SERPL-MCNC: 2.55 UIU/ML — SIGNIFICANT CHANGE UP (ref 0.27–4.2)
UROBILINOGEN FLD QL: NEGATIVE — SIGNIFICANT CHANGE UP
WBC # BLD: 7.25 K/UL — SIGNIFICANT CHANGE UP (ref 3.8–10.5)
WBC # FLD AUTO: 7.25 K/UL — SIGNIFICANT CHANGE UP (ref 3.8–10.5)
WBC UR QL: 0 /HPF — SIGNIFICANT CHANGE UP (ref 0–5)

## 2020-08-01 PROCEDURE — 84443 ASSAY THYROID STIM HORMONE: CPT

## 2020-08-01 PROCEDURE — 99284 EMERGENCY DEPT VISIT MOD MDM: CPT

## 2020-08-01 PROCEDURE — 81001 URINALYSIS AUTO W/SCOPE: CPT

## 2020-08-01 PROCEDURE — 96361 HYDRATE IV INFUSION ADD-ON: CPT

## 2020-08-01 PROCEDURE — 80053 COMPREHEN METABOLIC PANEL: CPT

## 2020-08-01 PROCEDURE — 85027 COMPLETE CBC AUTOMATED: CPT

## 2020-08-01 PROCEDURE — 99285 EMERGENCY DEPT VISIT HI MDM: CPT | Mod: 25

## 2020-08-01 PROCEDURE — 80307 DRUG TEST PRSMV CHEM ANLYZR: CPT

## 2020-08-01 PROCEDURE — 93005 ELECTROCARDIOGRAM TRACING: CPT

## 2020-08-01 PROCEDURE — 93010 ELECTROCARDIOGRAM REPORT: CPT

## 2020-08-01 PROCEDURE — 96360 HYDRATION IV INFUSION INIT: CPT

## 2020-08-01 PROCEDURE — 87086 URINE CULTURE/COLONY COUNT: CPT

## 2020-08-01 PROCEDURE — 81025 URINE PREGNANCY TEST: CPT

## 2020-08-01 RX ORDER — ACETAMINOPHEN 500 MG
975 TABLET ORAL ONCE
Refills: 0 | Status: COMPLETED | OUTPATIENT
Start: 2020-08-01 | End: 2020-08-01

## 2020-08-01 RX ORDER — SODIUM CHLORIDE 9 MG/ML
1000 INJECTION, SOLUTION INTRAVENOUS ONCE
Refills: 0 | Status: COMPLETED | OUTPATIENT
Start: 2020-08-01 | End: 2020-08-01

## 2020-08-01 RX ADMIN — SODIUM CHLORIDE 1000 MILLILITER(S): 9 INJECTION, SOLUTION INTRAVENOUS at 15:16

## 2020-08-01 RX ADMIN — SODIUM CHLORIDE 1000 MILLILITER(S): 9 INJECTION, SOLUTION INTRAVENOUS at 12:47

## 2020-08-01 RX ADMIN — Medication 975 MILLIGRAM(S): at 15:15

## 2020-08-01 NOTE — ED PROVIDER NOTE - CARE PROVIDER_API CALL
Owen Sweeney  PSYCHIATRY  [address] [place] [id number]  Phone: [phone]  Fax: [phone]  Follow Up Time:

## 2020-08-01 NOTE — ED PROVIDER NOTE - OBJECTIVE STATEMENT
35y f PMhx hypothyroidism/GERD p/w hyperventilation and diarrhea. Pt reports several episodes of hyperventilation accompanied by diffuse pins&needles and palpitations. admits to significant stress in life - was COVID + in March, reports since she hasn't felt herself and feels she cannot take care of her children at home. Admits to SI frequently but not right now. Has never seen a therapist/psychiatrist but agreed to in ED today. pt also reports several episodes of watery diarrhea since yesterday accompanied by feeling lightheaded when sitting from standing. +recent abx use (bactrim) last month, no recent hospitalizations. reports hydrating well w/water. Denies CP, SOB, fever, chills, abd pain, urinary symptoms, HAs, focal numbness/weakness, or loss of consciousness. Denies illicit drug use or etOH use. LMP July 16th. 35y f PMhx hypothyroidism/GERD p/w hyperventilation and diarrhea. Pt reports several episodes of hyperventilation accompanied by diffuse pins&needles and palpitations. admits to significant stress in life - was COVID + in March, reports since she hasn't felt herself and feels she cannot take care of her children at home. Admits to SI frequently but not right now. Has never seen a therapist/psychiatrist but agreeable to psych eval in ED today. pt also reports several episodes of watery diarrhea since yesterday accompanied by feeling lightheaded when sitting from standing. +recent abx use (bactrim) last month, no recent hospitalizations. reports hydrating well w/water. Denies CP, SOB, fever, chills, abd pain, urinary symptoms, HAs, focal numbness/weakness, room-spinning senation, or loss of consciousness. Denies illicit drug use or etOH use. LMP July 16th.

## 2020-08-01 NOTE — BEHAVIORAL HEALTH ASSESSMENT NOTE - RISK ASSESSMENT
Low Acute Suicide Risk The patient has modifiable risk factors for suicide which include underlying anxiety and unemployment. Non-modifiable risk factors include hx of recent stressor. Protective factors include supportive family, belief in god, no history of SA or NSSIB. Given the above the patient possess low acute risk of suicide or NSSIB.

## 2020-08-01 NOTE — ED ADULT NURSE REASSESSMENT NOTE - NS ED NURSE REASSESS COMMENT FT1
Patient resting comfortably in stretcher with 1:! observation at bedside  patient appears calm and cooperative and has no complaints at present time  pending psych consult

## 2020-08-01 NOTE — BEHAVIORAL HEALTH ASSESSMENT NOTE - NSBHCHARTREVIEWVS_PSY_A_CORE FT
Vital Signs Last 24 Hrs  T(C): 36.7 (01 Aug 2020 10:43), Max: 36.7 (01 Aug 2020 10:43)  T(F): 98 (01 Aug 2020 10:43), Max: 98 (01 Aug 2020 10:43)  HR: 79 (01 Aug 2020 10:43) (79 - 79)  BP: 114/78 (01 Aug 2020 10:43) (114/78 - 114/78)  BP(mean): --  RR: 16 (01 Aug 2020 10:43) (16 - 16)  SpO2: 100% (01 Aug 2020 10:43) (100% - 100%)

## 2020-08-01 NOTE — ED PROVIDER NOTE - PATIENT PORTAL LINK FT
You can access the FollowMyHealth Patient Portal offered by [facility] by registering at the following website: [email or web address]. By joining [facility]’s FollowMyHealth portal, you will also be able to view your health information using other applications (apps) compatible with our system.

## 2020-08-01 NOTE — ED PROVIDER NOTE - ATTENDING CONTRIBUTION TO CARE
------------ATTENDING NOTE------------   pt c/o anxiety about past COVID illness, now describing episodes of panic attacks, hyperventilating, gradual  and having tingling in hands/face and feeling lightheaded near passing out, also c/o looser nonbloody stools past 2 days, no fevers, now improving, overall very well appearing, nontoxic, nad, tolerating PO, soft benign abd, clear chest w/o distress, nml cardiac exam, equal distal pulses,   - Jaret Jansen MD   ------------------------------------------------ ------------ATTENDING NOTE------------   pt c/o anxiety about past COVID illness, now describing episodes of panic attacks, hyperventilating, gradual  and having tingling in hands/face and feeling lightheaded near passing out, also c/o looser nonbloody stools past 2 days, no fevers, now improving, overall very well appearing, nontoxic, nad, tolerating PO, soft benign abd, clear chest w/o distress, nml cardiac exam, equal distal pulses, pt does describe increased stress/depression, crying, and at times thoughts about harming/killing herself (only passively, never a plan), no drug/intoxicant use, feels safe at home, agreed to d/w Psych, medically cleared, cleared and safe by Psych, nml VS at dc, tolerating PO, in depth dw all about ddx, tx, arce, continued close outpt fu.  - Jaret Jansen MD   ------------------------------------------------

## 2020-08-01 NOTE — ED ADULT NURSE REASSESSMENT NOTE - NS ED NURSE REASSESS COMMENT FT1
patient seen at bedside by psych and 1:1 discontinued. patient calm and cooperative and resting comfortably

## 2020-08-01 NOTE — BEHAVIORAL HEALTH ASSESSMENT NOTE - CASE SUMMARY
Pt is a 36 y/o   speaking female,  used, here for vague somatic complaints of blurry vision. Psychiatry called to assess for +SI which pt had several weeks ago. Pt seen, AOA x 3, denies si/hi, no hx of self injurious behaviors or suicide attempts. denies current depression, but reports periods of intermittent anxiety symptoms secondary to medical issues. pt denies hx of manic or psychotic symptoms. Collateral information from pt's  also confirms pt is not a danger to self or others.  feels comfortable with pt's d/c home, with outpt mental health referrals.

## 2020-08-01 NOTE — ED PROVIDER NOTE - CARE PLAN
Principal Discharge DX:	Diarrhea  Secondary Diagnosis:	Anxiety about health  Secondary Diagnosis:	Hyperventilation syndrome Principal Discharge DX:	Diarrhea  Secondary Diagnosis:	Anxiety about health  Secondary Diagnosis:	Hyperventilation syndrome  Secondary Diagnosis:	Depression

## 2020-08-01 NOTE — ED ADULT NURSE NOTE - OBJECTIVE STATEMENT
patient c/o diarrhea and dizziness. patient also states she has had increasing panic attacks since testing positive for covid in march. states she does have suicidal ideations with no plan or attempts. 1:1 initiated

## 2020-08-01 NOTE — ED PROVIDER NOTE - PROGRESS NOTE DETAILS
on call psych attg paged x1 - Yusuf Ledezma PA-C labwork unrevealing, CHERYL NEG. psych attg paged x2, LVM with secondary phone # - Yusuf Ledezma PA-C D/w psych attg Dr Sweeney aware of pt will see in ED - Yusuf Ledezma PA-C Pt evaluated by psych Dr. Sweeney - cleared from psych standpoint, can followup outpt. pt feeling well currently denies SI/HI. admits to mild frontal HA, requesting tylenol. Will dc with follow up with psychiatry. Discussed plan and return precautions with patient who understands and agrees. All questions answered. - Yusuf Ledezma PA-C

## 2020-08-01 NOTE — ED PROVIDER NOTE - NSFOLLOWUPINSTRUCTIONS_ED_ALL_ED_FT
Consulte a knight médico de cabecera y psiquiatría la próxima semana para un seguimiento; llame para hablar.    Manténgase catracho hidratado, coma comidas saludables regularmente, descanse lo suficiente.    Consulte la información sobre ansiedad y las instrucciones de devolución que le proporcionamos.    Busque atención médica inmediata para los síntomas / inquietudes nuevos / que empeoran.

## 2020-08-01 NOTE — BEHAVIORAL HEALTH ASSESSMENT NOTE - SUICIDE PROTECTIVE FACTORS
Responsibility to family and others/Supportive social network of family or friends/Fear of death or the actual act of killing self/Ability to cope with stress/Frustration tolerance/Jainism beliefs/Identifies reasons for living/Has future plans/Cultural, spiritual and/or moral attitudes against suicide

## 2020-08-01 NOTE — BEHAVIORAL HEALTH ASSESSMENT NOTE - SUMMARY
The patient is a 35 year old woman out of work since March following COVID infection, , lives with  Lukas and young son, no past psychiatric history, not in treatment, no hx of SA, no hx of substance abuse, medical history of hypothyroidism, presenting with diarrhea and weakness. Psychiatry consulted to evaluate for suicidal ideation based on patient self report to providers.    The patient reports 2 episodes of passive SI over the last month, without intent or plan and no history of SA or NSSIB. The patient's  reached for collateral states he has no concerns that the patient would act on any thoughts to harm herself

## 2020-08-01 NOTE — BEHAVIORAL HEALTH ASSESSMENT NOTE - NSBHCONSULTRECOMMENDOTHER_PSY_A_CORE FT
1. Referral to the [facility] adult outpatient division for ongoing outpatient psychiatric care [phone] or to the crisis center walk in clinic at [facility] [phone] 1. Referral to the [facility] adult outpatient division for ongoing outpatient psychiatric care [phone] or to the crisis center walk in clinic at [facility] [phone]    2. Pt psychiatrically cleared for discharge following medical clearance

## 2020-08-01 NOTE — CHART NOTE - NSCHARTNOTEFT_GEN_A_CORE
Social Work was referred to patient in ED due to SI. Medical chart reviewed. Patient is a 35 year old, Bahamian speaking, , female who presents with complaints of dizziness and blurred vision. LMSW met with patient at bedside with the assistance of  #[id number] for psychosocial/ needs assessment and above mentioned reason. Patient is currently on a 1:1. LMSW introduced self and explained the social work role. Patient verbalized understanding and agreed to provide information.   Patient lives with her spouse Lukas Hudson [phone], and 2 year old son in a private home located in [place], [place]. Patient is undocumented with emergency Medicaid. Patient reports she does not have a PCP but goes to the clinic at [address] [place] [address], when needed.   Patient verbalized she has had SI in the recent past but does not currently have those feelings. Patient reports she has blurry vision and dizziness and sometimes it is overwhelming. Patient states she believes in God and would not hurt herself. Patient reports when she has those feelings she repeat to herself "Why am I doing this" and the feelings go away. LMSW provided supportive counseling and offered resources. Patient declined resources and stated she can control her feelings. LMSW provided contact information and will continue to be available. Medical team is aware.

## 2020-08-01 NOTE — BEHAVIORAL HEALTH ASSESSMENT NOTE - NSBHCHARTREVIEWLAB_PSY_A_CORE FT
LABS:  cret                        14.0   7.25  )-----------( 190      ( 01 Aug 2020 12:22 )             41.4     08-01    141  |  103  |  8   ----------------------------<  99  4.2   |  22  |  0.61    Ca    9.8      01 Aug 2020 12:22    TPro  7.1  /  Alb  4.5  /  TBili  0.4  /  DBili  x   /  AST  22  /  ALT  33  /  AlkPhos  71  08-01

## 2020-08-01 NOTE — BEHAVIORAL HEALTH ASSESSMENT NOTE - HPI (INCLUDE ILLNESS QUALITY, SEVERITY, DURATION, TIMING, CONTEXT, MODIFYING FACTORS, ASSOCIATED SIGNS AND SYMPTOMS)
The patient is a 35 year old woman out of work since March following COVID infection, , lives with  Lukas and young son, no past psychiatric history, not in treatment, no hx of SA, no hx of substance abuse, medical history of hypothyroidism, presenting with diarrhea and weakness. Psychiatry consulted to evaluate for suicidal ideation based on patient self report to providers.    The patient reports that since March after she was ill with COVID she has been very anxious and has had several panic attacks and episodes of hyperventilation. She has been feeling weak for the last several months and over the past month has had two episodes where she felt so poorly that she had the thought that maybe she would be better off if she was not alive but had no plan to actively end her life. The thoughts frightened her and she has no intention of acting on them. She endorses some lingering anxiety since her COVID diagnosis in March but denies depression, hopelessness, poor sleep, poor appetite. She denies any current suicidal ideation, suicidal intent, A/V hallucinations, substance abuse.    Patient's  Lukas states that the patient's health has been a concern since December when she was hospitalized for sepsis secondary to e.coli bacteremia. She has had numerous somatic complaints since her discharge and has visited the ED multiple times since that admission. He has no concern that the patient would act on any thoughts to harm herself or anyone else, but would like resources for outpatient psychiatric care for his wife.

## 2020-08-02 LAB
CULTURE RESULTS: SIGNIFICANT CHANGE UP
SPECIMEN SOURCE: SIGNIFICANT CHANGE UP

## 2020-08-04 ENCOUNTER — OUTPATIENT (OUTPATIENT)
Dept: OUTPATIENT SERVICES | Facility: HOSPITAL | Age: 35
LOS: 1 days | End: 2020-08-04
Payer: SELF-PAY

## 2020-08-04 ENCOUNTER — APPOINTMENT (OUTPATIENT)
Dept: NEUROLOGY | Facility: HOSPITAL | Age: 35
End: 2020-08-04

## 2020-08-04 VITALS
BODY MASS INDEX: 29.44 KG/M2 | SYSTOLIC BLOOD PRESSURE: 111 MMHG | WEIGHT: 160 LBS | HEART RATE: 89 BPM | TEMPERATURE: 97.7 F | DIASTOLIC BLOOD PRESSURE: 79 MMHG | HEIGHT: 62 IN | RESPIRATION RATE: 16 BRPM

## 2020-08-04 DIAGNOSIS — H53.8 OTHER VISUAL DISTURBANCES: ICD-10-CM

## 2020-08-04 DIAGNOSIS — R42 DIZZINESS AND GIDDINESS: ICD-10-CM

## 2020-08-04 DIAGNOSIS — R56.9 UNSPECIFIED CONVULSIONS: ICD-10-CM

## 2020-08-04 PROCEDURE — G0463: CPT

## 2020-08-04 NOTE — REASON FOR VISIT
[Initial Evaluation] : an initial evaluation [Source: ______] : History obtained from [unfilled] [[employer] ] : provided by [employer]   [FreeTextEntry1] : [id number] [TWNoteComboBox1] : Cymro [FreeTextEntry2] : Tanya

## 2020-08-04 NOTE — DISCUSSION/SUMMARY
[FreeTextEntry1] : 34 yo woman with MHx of Ecoli sepsis + intubation in 12/2019 from pyelonephritis, self-reported covid+ve in 3/2020 (mild sx), presents to Neurology clinic for vague bitemporal HA, paroxysmal vertigo (internal swaying) and blurry/darkening vision since March.\par \par Impression: Possible Vestibular neuronitis or migraine as etiology of her sx. Negative outside MRI/MRA and ENT w/u no indicative of peripheral type vertigo per VNG. \par \par [] start nortriptyline 10mg qhs  (advised to trial med for at least month, as can take some time to show results)\par [] Magnesium 400mg qd\par [] Vestibular Therapy\par [] Optho eval to r/o organic eye issue given vague reproted sx\par [] counseled on importance of psych f/u for feeling overwhelmed and for better coping mechansim (currently denies depressive sx, SI/HI.) Sleep hygeine\par [] keep HA diary\par \par answered pt's questions and concerns and she voiced understanding.\par \par Pt seen and d/w Dr. Nissenbaum (neurology attending)\par

## 2020-08-04 NOTE — HISTORY OF PRESENT ILLNESS
[FreeTextEntry1] : 34 yo woman with MHx of Ecoli sepsis + intubation in 12/2019 from pyelonephritis, self-reported covid+ve in 3/2020 (mild sx), presents to Neurology clinic for vague bitemporal HA, paroxysmal vertigo (internal swaying) and blurry/darkening vision since March. Pt has had multiple ER visits for similar complaints, had MRI/MRA brain/neck at [facility] which were negative (result scanned in chart already), was also given sumatriptan 50 by PCP which she tried few times (unclear if used appropriately at onset) but discontinued seeing no benefit. She also reports feeling overwhelmed as she has been unable to work due to her vertigo and vision issues, causing marital and economical stress with a 2 year old child at home. She also was seen for panic attack and passive SI in ER, which improved and was cleared by psych for asked for outpt f/u which she has deferred for now. \par She was seen by ENT and their workup revealed evidence of central vestibulopathy but negative for peripheral causes of vertigo.\par Denies toxic habits, photo/phonophobia, focal numbness, weakness, LOC, bowel bladder issues. \par She denies family hx of migraines, aneurysm or strokes.\par Has used tylenol for OTC headache analgesia only

## 2020-08-04 NOTE — PHYSICAL EXAM
[FreeTextEntry1] : Gen: appears comfortable\par neck supple, full rom, no central or midline tenderness\par Affect: somewhat anxious but otherwise pleasant\par AAOX3, speech fluent, rep/comp/nam intact, \par EOMI, PERRLA, no facial asymmetry V1-V3 intact\par MAEx4\par sensory intact to LT/pin\par no dysmetria F2N, H2S\par toes downgoing\par tandem gait wnl, romberg negative\par

## 2020-08-05 ENCOUNTER — OUTPATIENT (OUTPATIENT)
Dept: OUTPATIENT SERVICES | Facility: HOSPITAL | Age: 35
LOS: 1 days | End: 2020-08-05
Payer: SELF-PAY

## 2020-08-05 ENCOUNTER — APPOINTMENT (OUTPATIENT)
Dept: INTERNAL MEDICINE | Facility: CLINIC | Age: 35
End: 2020-08-05

## 2020-08-05 VITALS
WEIGHT: 158 LBS | HEIGHT: 62 IN | DIASTOLIC BLOOD PRESSURE: 60 MMHG | SYSTOLIC BLOOD PRESSURE: 112 MMHG | BODY MASS INDEX: 29.08 KG/M2

## 2020-08-05 DIAGNOSIS — R10.32 LEFT LOWER QUADRANT PAIN: ICD-10-CM

## 2020-08-05 DIAGNOSIS — G89.29 LEFT LOWER QUADRANT PAIN: ICD-10-CM

## 2020-08-05 DIAGNOSIS — F41.9 ANXIETY DISORDER, UNSPECIFIED: ICD-10-CM

## 2020-08-05 DIAGNOSIS — H81.20 VESTIBULAR NEURONITIS, UNSPECIFIED EAR: ICD-10-CM

## 2020-08-05 DIAGNOSIS — R42 DIZZINESS AND GIDDINESS: ICD-10-CM

## 2020-08-05 DIAGNOSIS — I10 ESSENTIAL (PRIMARY) HYPERTENSION: ICD-10-CM

## 2020-08-05 PROCEDURE — G0463: CPT

## 2020-08-06 PROBLEM — F41.9 ANXIETY: Status: ACTIVE | Noted: 2020-08-03

## 2020-08-06 PROBLEM — R10.32 CHRONIC LEFT LOWER QUADRANT PAIN: Status: ACTIVE | Noted: 2020-08-06

## 2020-08-06 NOTE — HISTORY OF PRESENT ILLNESS
[FreeTextEntry1] : dizziness [de-identified] : Patient is a 35 year old female with PMHx of E coli sepsis c/b MICU admission with intubation and COVID-19 infection in April 2020 (PCR neg, Antibody positive) presents with dizziness, blurry vision and HA for 4 months. Patient has had multiple ED visits within and outside of [facility]. Has had extensive cardiology (ECHO with normal LV, normal RV and EF 55-60%, mild MR) and neuro workup (normal CT, MRA head and neck normal, MRI normal) at [facility]. Neg VNG r/o peripheral vestibulopathy. Neuro ruperto 8/3 dx patient with vestibular neuronitis with migraines and prescribed nortryptiline and Mg. \par \par At presentation, endorses intermittent dizziness that occurs randomly and improves with rest. Has not started meds prescribed by neuro. Often feels uneasy on her feet but denies falls. Blurry vision with darkening of field of vision. Bilateral and unilateral temporal headaches, worsens with sunlight. Previous syncopal episodes 3 months ago after episode of dizziness. No recent episodes of fainting, LOC. Denies N/V, subjective fevers, SOB, paresthesias, numbness or loss of motion.\par \par Endorses LLQ abdominal pain that worsens with lying on L side. Has hx of ovarian cysts. Pain not associated with menstruation, not associated with diarrhea, nausea, vomiting. The pain is not constant, it does not radiate and it started years ago and she was noted to have a cyst or "ball" in her words that her obgyn told her was nothing to worry about. 2015 TVUS negative for any abnormality. She has no fever or chills. The  Recent ED visit where patient for panic attack, endorsing depressed mood and possible SI. States provider misinterpreted her and does not have SI and does not want to go to a psychiatrist.  \par

## 2020-08-06 NOTE — ASSESSMENT
[FreeTextEntry1] : Patient is a 35 year old female with E. coli sepsis c/b MICU admission with intubation, likely COVID-19 infection and vestibular neuronitis s/p extensive cardio and neuro workup presenting for follow-up.

## 2020-08-06 NOTE — PHYSICAL EXAM
[No Acute Distress] : no acute distress [Well Developed] : well developed [Well Nourished] : well nourished [Well-Appearing] : well-appearing [Normal Sclera/Conjunctiva] : normal sclera/conjunctiva [PERRL] : pupils equal round and reactive to light [EOMI] : extraocular movements intact [No Lymphadenopathy] : no lymphadenopathy [Supple] : supple [No Respiratory Distress] : no respiratory distress  [Clear to Auscultation] : lungs were clear to auscultation bilaterally [No Accessory Muscle Use] : no accessory muscle use [Regular Rhythm] : with a regular rhythm [Normal Rate] : normal rate  [No Carotid Bruits] : no carotid bruits [Normal S1, S2] : normal S1 and S2 [Soft] : abdomen soft [Non-distended] : non-distended [Non Tender] : non-tender [Normal Bowel Sounds] : normal bowel sounds [Coordination Grossly Intact] : coordination grossly intact [No Focal Deficits] : no focal deficits [Normal Gait] : normal gait [Normal Insight/Judgement] : insight and judgment were intact [Normal Affect] : the affect was normal [de-identified] : CN 2-12 intact, no vertical or horizonatal nystagmus, 5/5 strength in upper and lower extremities b/l, Romberg negative [de-identified] : Denies SI

## 2020-08-06 NOTE — PLAN
[FreeTextEntry1] : \par #Vestibular neuronitis\par - Likely 2/2 to COVID-19 infection\par - C/w nortriptyline 10 mg QD\par - C/w Mg 400 mg QD\par - Headache + symptoms diary\par - Optho appt\par - F/u neurology\par \par # LLQ Abdominal pain\par - Extensive GI workup, denies N/V, diarrhea, constipation, change in diet, melena, hematochezia/hematemesis\par - Concern for ovarian cyst\par - Ordered Transvaginal U/S\par \par # Panic attack\par - Endorsed recent panic attack with depressed mood\par - Mood has improved, endorses reduced anxiety, denied SI after extensive questioning\par - Declined Psychiatry referral but reiterated to patient that she can f/u with Psychiatry clinic with phone number received at ED visit\par \par #HCM\par -RTC in 3 months

## 2020-08-06 NOTE — REVIEW OF SYSTEMS
[Vision Problems] : vision problems [Sore Throat] : sore throat [Abdominal Pain] : abdominal pain [Dizziness] : dizziness [Fever] : no fever [Chills] : no chills [Fatigue] : no fatigue [Night Sweats] : no night sweats [Earache] : no earache [Hearing Loss] : no hearing loss [Hoarseness] : no hoarseness [Postnasal Drip] : no postnasal drip [Palpitations] : no palpitations [Chest Pain] : no chest pain [Orthopnea] : no orthopnea [Lower Ext Edema] : no lower extremity edema [Shortness Of Breath] : no shortness of breath [Wheezing] : no wheezing [Constipation] : no constipation [Cough] : no cough [Nausea] : no nausea [Diarrhea] : diarrhea [Vomiting] : no vomiting [Melena] : no melena [Dysuria] : no dysuria [Heartburn] : no heartburn [Frequency] : no frequency [Hematuria] : no hematuria [Suicidal] : not suicidal [Headache] : no headache [FreeTextEntry7] : LLQ pain that worsens with lying on L side [FreeTextEntry3] : Blurry vision

## 2020-08-06 NOTE — END OF VISIT
[] : Resident [FreeTextEntry3] : 35F with hx covid infection who comes to clinic complaining of dizziness, headaches that started after covid infection. Many of her symptoms she is describing occurred weeks ago when she had the infection and she is looking for answers as to why they are occurring. She also has LLQ pain that is chronic, has been present for years and has no associated GI or  symptoms. She did mention possible hx of cyst and will obtain US. recommended she f/u with neurology and try Mg and nortriptyline. She should have frequent visits with medicine clinic as well as it seems that some of her symptoms are anxiety driven although ENT work up did show some abnormalities and she will f/u with ophthalmology as well.

## 2020-08-18 ENCOUNTER — OUTPATIENT (OUTPATIENT)
Dept: OUTPATIENT SERVICES | Facility: HOSPITAL | Age: 35
LOS: 1 days | End: 2020-08-18
Payer: SELF-PAY

## 2020-08-18 ENCOUNTER — APPOINTMENT (OUTPATIENT)
Dept: OPHTHALMOLOGY | Facility: CLINIC | Age: 35
End: 2020-08-18

## 2020-08-18 ENCOUNTER — APPOINTMENT (OUTPATIENT)
Dept: NEUROLOGY | Facility: HOSPITAL | Age: 35
End: 2020-08-18

## 2020-08-18 VITALS
TEMPERATURE: 99.5 F | WEIGHT: 158 LBS | HEIGHT: 62 IN | RESPIRATION RATE: 14 BRPM | BODY MASS INDEX: 29.08 KG/M2 | SYSTOLIC BLOOD PRESSURE: 110 MMHG | DIASTOLIC BLOOD PRESSURE: 77 MMHG | HEART RATE: 85 BPM

## 2020-08-18 DIAGNOSIS — R56.9 UNSPECIFIED CONVULSIONS: ICD-10-CM

## 2020-08-18 DIAGNOSIS — G43.109 MIGRAINE WITH AURA, NOT INTRACTABLE, WITHOUT STATUS MIGRAINOSUS: ICD-10-CM

## 2020-08-18 PROCEDURE — G0463: CPT

## 2020-08-18 NOTE — HISTORY OF PRESENT ILLNESS
[FreeTextEntry1] : Interval History\par Language line  [id number] used, patient speaks Monegasque\par Patient presents to clinic 2 weeks after initial appointment - states she was told to come here, today CC is continued blurred vision especially when she had throat pain (which she has had for a year) and associated blurred vision and dizziness. She does take the Nortriptyline and Mag Rxed last time, states that they do help with her headache. She has an optho appointment scheduled for today, and she is actively following with her PCP. \par \par HPI\par Patient is a 35 year old woman with PMHx of Ecoli sepsis + intubation in 12/2019 from pyelonephritis, self-reported covid+ve in 3/2020 (mild sx), who had presented to Neurology clinic originally on 8/4/20 for vague bitemporal HA, paroxysmal vertigo (internal swaying) and blurry/darkening vision since March. Pt has had multiple ER visits for similar complaints, had MRI/MRA brain/neck at [facility] which were negative, was also given sumatriptan 50 by PCP which she tried few times (unclear if used appropriately at onset) but discontinued seeing no benefit. She also reports feeling overwhelmed as she has been unable to work due to her vertigo and vision issues, causing marital and economical stress with a 2 year old child at home. She also was seen for panic attack and passive SI in ER, which improved and was cleared by psych for asked for outpt f/u which she has deferred for now. She was seen by ENT and their workup revealed evidence of central vestibulopathy but negative for peripheral causes of vertigo.\par Denies toxic habits, photo/phonophobia, focal numbness, weakness, LOC, bowel bladder issues. \par She denies family hx of migraines, aneurysm or strokes. Has used tylenol for OTC headache analgesia only

## 2020-08-18 NOTE — PHYSICAL EXAM
[FreeTextEntry1] : Gen: appears comfortable\par neck supple, full ROM, no central or midline tenderness\par AAOX3, speech fluent, rep/comp/nam intact, \par CN: EOMI, PERRLA, no facial asymmetry V1-V3 intact\par Motor: MAEx4\par SILT\par no dysmetria F2N, H2S\par toes downgoing\par tandem gait wnl, romberg negative\par

## 2020-08-18 NOTE — DISCUSSION/SUMMARY
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 Negative outside MRI/MRA and ENT w/u no indicative of peripheral type vertigo per VNG. \par \par [] continue nortriptyline 10mg qhs \par [] Magnesium 400mg qd\par [] Vestibular Therapy\par [] Optho eval to r/o organic eye issue given vague reproted sx - appt today\par [] keep HA diary\par [] RTC in 3 months\par \par Pt d/w Dr Robson Zavaleta\par

## 2020-08-18 NOTE — REASON FOR VISIT
[Follow-Up: _____] : a [unfilled] follow-up visit [Initial Evaluation] : an initial evaluation [[employer] ] : provided by [employer]   [Source: ______] : History obtained from [unfilled] [FreeTextEntry1] : [id number] [TWNoteComboBox1] : Romanian [FreeTextEntry2] : Tanya

## 2020-08-24 ENCOUNTER — RESULT REVIEW (OUTPATIENT)
Age: 35
End: 2020-08-24

## 2020-08-24 ENCOUNTER — APPOINTMENT (OUTPATIENT)
Dept: ULTRASOUND IMAGING | Facility: IMAGING CENTER | Age: 35
End: 2020-08-24
Payer: COMMERCIAL

## 2020-08-24 ENCOUNTER — OUTPATIENT (OUTPATIENT)
Dept: OUTPATIENT SERVICES | Facility: HOSPITAL | Age: 35
LOS: 1 days | End: 2020-08-24
Payer: SELF-PAY

## 2020-08-24 DIAGNOSIS — Z00.00 ENCOUNTER FOR GENERAL ADULT MEDICAL EXAMINATION WITHOUT ABNORMAL FINDINGS: ICD-10-CM

## 2020-08-24 PROCEDURE — 76830 TRANSVAGINAL US NON-OB: CPT

## 2020-08-24 PROCEDURE — 76830 TRANSVAGINAL US NON-OB: CPT | Mod: 26

## 2020-08-26 ENCOUNTER — APPOINTMENT (OUTPATIENT)
Dept: INTERNAL MEDICINE | Facility: CLINIC | Age: 35
End: 2020-08-26

## 2020-08-26 VITALS
SYSTOLIC BLOOD PRESSURE: 112 MMHG | DIASTOLIC BLOOD PRESSURE: 70 MMHG | WEIGHT: 160 LBS | HEIGHT: 62 IN | BODY MASS INDEX: 29.44 KG/M2

## 2020-08-26 DIAGNOSIS — Z86.69 PERSONAL HISTORY OF OTHER DISEASES OF THE NERVOUS SYSTEM AND SENSE ORGANS: ICD-10-CM

## 2020-08-28 PROBLEM — Z86.69 HISTORY OF ITCHING OF EYE: Status: ACTIVE | Noted: 2020-08-28

## 2020-08-31 NOTE — HISTORY OF PRESENT ILLNESS
[FreeTextEntry8] : ID: [id number] Daniel , in addition to [id number] \par \par 35F PMH Ecoli sepsis + intubation 12/2019 from pyelonephritis, self reported COVID+ 03/2020 (mild sx), who pw with throat pain. Pt describes throat pain x 1 yr described as central throat pain with lateral pain with movement of head, worse with swallowing (however not related to specific food type), and at times laying down, unchanged by time of day. In addition she describes dry/itchy eyes with pain and visual disturbance (dim vision), rhinorrhea, ear pain, without sneezing or other body itch. Also reports abd/pelvic pain but states she has been seen by gyn w/o abnormality.  She reports CRAFT, chest pain, 23lb wt loss x 5 mo when she states she had COVID in march/april despite adequate diet. She denies fever, chills, night sweats, denies issue w/ BM or dysuria.\par \par Pt states that she has used OTC fluticasone and claritin for few days before cessation due to lack of benefit.\par \par Of note pt seen by ENT for episodic dizziness w/ w/u negative for peripheral vestibulopathy, VNG showed central vestibulopathy which could be 2/2 migraines, referred to neurology, and PMD for perfusion issues with regard to feeling faint/dizzy. Seen by neuro for HA, vertigo, blurry/darkening vision, thought to be due to vestibular neuronitis/migraine, stated that outside MRI/MRA negative. Referred to optho who pt saw and she states that they did not find any abnormality.

## 2020-08-31 NOTE — ASSESSMENT
[FreeTextEntry1] : 35F PMH Ecoli sepsis + intubation 12/2019 from pyelonephritis, self reported COVID+ 03/2020 (mild sx), who pw with throat pain, dry/itchy eyes, rhinorrhea, ear pain for ~ 1 year -  likely 2/2 allergic rhinitis/conjunctivitis vs thyroiditis vs GERD.\par \par

## 2020-08-31 NOTE — PHYSICAL EXAM
[No Acute Distress] : no acute distress [Well Nourished] : well nourished [Normal Sclera/Conjunctiva] : normal sclera/conjunctiva [Well-Appearing] : well-appearing [Well Developed] : well developed [PERRL] : pupils equal round and reactive to light [No Respiratory Distress] : no respiratory distress  [Supple] : supple [Normal Rate] : normal rate  [Regular Rhythm] : with a regular rhythm [Clear to Auscultation] : lungs were clear to auscultation bilaterally [Soft] : abdomen soft [No Murmur] : no murmur heard [Normal S1, S2] : normal S1 and S2 [Non Tender] : non-tender [Non-distended] : non-distended [Normal Bowel Sounds] : normal bowel sounds [de-identified] : +pupils medium to large size however equal and reactive to light b/l [de-identified] : Outer ear nml, ear canal with some erythema, normal TMs. Normal nasal mucosa. With some oropharyngeal erythema [de-identified] : No tenderness to palpation [de-identified] : anxious appearing at times

## 2020-08-31 NOTE — PLAN
[FreeTextEntry1] : #Allergic rhinitis/conjunctivitis\par - trial of OTC oral antihistamine allegra (fexofenadine)\par - trial of OTC nasal steroid fluticasone (2 sprays each nostril once per day)\par - educated pt regarding consistent use, as benefit may not be apparent for first 7-10 days. \par - rtc 5 wks to assess whether sx improve, as well as to discuss other sx\par \par Case and plan d/w Dr. Rogers\par \par

## 2020-08-31 NOTE — REVIEW OF SYSTEMS
[Pain] : pain [Dryness] : dryness  [Vision Problems] : vision problems [Itching] : itching [Abdominal Pain] : abdominal pain [Nausea] : nausea [Headache] : headache [Dizziness] : dizziness [Earache] : earache [Fever] : no fever [Chills] : no chills [Night Sweats] : no night sweats [Constipation] : no constipation [Diarrhea] : diarrhea [Dysuria] : no dysuria [Itching] : no itching [FreeTextEntry3] : as per HPI [FreeTextEntry4] : as per HPI [FreeTextEntry5] : as per HPI [FreeTextEntry6] : as per HPI

## 2020-09-01 ENCOUNTER — APPOINTMENT (OUTPATIENT)
Dept: OBGYN | Facility: HOSPITAL | Age: 35
End: 2020-09-01
Payer: COMMERCIAL

## 2020-09-01 ENCOUNTER — OUTPATIENT (OUTPATIENT)
Dept: OUTPATIENT SERVICES | Facility: HOSPITAL | Age: 35
LOS: 1 days | End: 2020-09-01

## 2020-09-01 ENCOUNTER — LABORATORY RESULT (OUTPATIENT)
Age: 35
End: 2020-09-01

## 2020-09-01 ENCOUNTER — RESULT REVIEW (OUTPATIENT)
Age: 35
End: 2020-09-01

## 2020-09-01 VITALS
DIASTOLIC BLOOD PRESSURE: 79 MMHG | HEIGHT: 62 IN | WEIGHT: 161 LBS | SYSTOLIC BLOOD PRESSURE: 122 MMHG | BODY MASS INDEX: 29.63 KG/M2 | HEART RATE: 71 BPM | TEMPERATURE: 98.2 F

## 2020-09-01 DIAGNOSIS — Z00.00 ENCOUNTER FOR GENERAL ADULT MEDICAL EXAMINATION WITHOUT ABNORMAL FINDINGS: ICD-10-CM

## 2020-09-01 PROCEDURE — 99212 OFFICE O/P EST SF 10 MIN: CPT | Mod: GE

## 2020-09-01 PROCEDURE — P3001: CPT | Mod: NC

## 2020-09-02 LAB
HIV 1+2 AB+HIV1 P24 AG SERPL QL IA: SIGNIFICANT CHANGE UP
HPV HIGH+LOW RISK DNA PNL CVX: SIGNIFICANT CHANGE UP
T PALLIDUM AB TITR SER: NEGATIVE — SIGNIFICANT CHANGE UP

## 2020-09-03 NOTE — PHYSICAL EXAM
[Awake] : awake [Alert] : alert [Soft] : soft [Oriented x3] : oriented to person, place, and time [Normal] : uterus [Labia Majora] : labia major [Labia Minora] : labia minora [Pink Rugae] : pink rugae [Pap Obtained] : a Pap smear was performed [Anteversion] : anteverted [Uterine Adnexae] : were not tender and not enlarged [Goiter] : no goiter [Acute Distress] : no acute distress [Mass] : no breast mass [Nipple Discharge] : no nipple discharge [Distended] : not distended [Tender] : non tender [Axillary LAD] : no axillary lymphadenopathy [Discharge] : had no discharge [Mass ___ cm] : had no mass [Motion Tenderness] : there was no cervical motion tenderness [Tenderness] : nontender [Enlarged ___ wks] : not enlarged [Adnexa Tenderness] : were not tender [Ovarian Mass (___ Cm)] : there were no adnexal masses

## 2020-09-03 NOTE — HISTORY OF PRESENT ILLNESS
[___ Year(s) Ago] : [unfilled] year(s) ago [Good] : being in good health [Healthy Diet] : a healthy diet [Regular Exercise] : regular exercise [Current] : cancer screening reviewed and current [Last Pap ___] : Last cervical pap smear was [unfilled] [Reproductive Age] : is of reproductive age [Pregnancy History] : pregnancy history: [Sexually Active] : is sexually active [Menstrual Problems] : reports normal menses [Contraception] : does not use contraception

## 2020-09-04 LAB
C TRACH RRNA SPEC QL NAA+PROBE: SIGNIFICANT CHANGE UP
N GONORRHOEA RRNA SPEC QL NAA+PROBE: SIGNIFICANT CHANGE UP
SPECIMEN SOURCE: SIGNIFICANT CHANGE UP

## 2020-09-05 LAB — CYTOLOGY SPEC DOC CYTO: SIGNIFICANT CHANGE UP

## 2020-09-30 ENCOUNTER — APPOINTMENT (OUTPATIENT)
Dept: INTERNAL MEDICINE | Facility: CLINIC | Age: 35
End: 2020-09-30

## 2020-09-30 ENCOUNTER — OUTPATIENT (OUTPATIENT)
Dept: OUTPATIENT SERVICES | Facility: HOSPITAL | Age: 35
LOS: 1 days | End: 2020-09-30
Payer: SELF-PAY

## 2020-09-30 VITALS
WEIGHT: 165 LBS | SYSTOLIC BLOOD PRESSURE: 90 MMHG | OXYGEN SATURATION: 99 % | HEART RATE: 78 BPM | HEIGHT: 62 IN | DIASTOLIC BLOOD PRESSURE: 50 MMHG | BODY MASS INDEX: 30.36 KG/M2

## 2020-09-30 DIAGNOSIS — J30.9 ALLERGIC RHINITIS, UNSPECIFIED: ICD-10-CM

## 2020-09-30 DIAGNOSIS — R09.89 OTHER SPECIFIED SYMPTOMS AND SIGNS INVOLVING THE CIRCULATORY AND RESPIRATORY SYSTEMS: ICD-10-CM

## 2020-09-30 DIAGNOSIS — K21.9 GASTRO-ESOPHAGEAL REFLUX DISEASE WITHOUT ESOPHAGITIS: ICD-10-CM

## 2020-09-30 DIAGNOSIS — Z23 ENCOUNTER FOR IMMUNIZATION: ICD-10-CM

## 2020-09-30 DIAGNOSIS — K21.9 GASTRO-ESOPHAGEAL REFLUX DISEASE W/OUT ESOPHAGITIS: ICD-10-CM

## 2020-09-30 DIAGNOSIS — I10 ESSENTIAL (PRIMARY) HYPERTENSION: ICD-10-CM

## 2020-09-30 PROCEDURE — G0008: CPT

## 2020-09-30 PROCEDURE — 90688 IIV4 VACCINE SPLT 0.5 ML IM: CPT

## 2020-09-30 PROCEDURE — G0463: CPT | Mod: 25

## 2020-10-08 PROBLEM — J30.9 ALLERGIC RHINITIS: Status: ACTIVE | Noted: 2020-10-08

## 2020-10-08 PROBLEM — K21.9 GERD WITHOUT ESOPHAGITIS: Status: ACTIVE | Noted: 2019-11-20

## 2020-10-08 NOTE — HISTORY OF PRESENT ILLNESS
[FreeTextEntry1] : F/u regarding sx including throat pain, ear pain, jaw pain, headache, dizziness [de-identified] : 35F Hungarian-speaking with PMH E.coli sepsis w/ intubation 12/2019 2/2 pyelonephritis, self reported COVID infxn 3/2020 (+ antibody 6/2020) who presents for f/u of throat pain, dry/itchy eyes, rhinorrhea, ear pain x 1 yr in addition to dizziness, thought to be 2/2 allergic rhinitis/conjunctivitis given trial of OTC oral antihistamine (fexofenadine), nasal steroid (fluticasone). \par \par She states that she continues to experience throat pain despite medication use, in addition to head pain. In addition she still reports ear pain, jaw pain, which coincides with head pain, and with persisting dizziness x 6 mo (states occurs constantly). She reports resolution of rhinorrhea, improvement in eye itchiness/dryness but at times still has blurry vision. States she used nasal spray and oral antihistamine in addition to eye  drops x 30 days. \par \par ROS: denies SOB, reports some pain at sides of chest that isn't excessive, not worse with exertion, not temporally associated with episodes of dizziness, sometimes abd pain in LUQ, states GYN visit recently w/o issues (however she states she was told she had "lumps" somewhere in past during pregnancy by GYN). She also reports L foot numbness extending to knee x 1-2 wks without known exacerbating factor or time of day. \par \par She attributes start of all symptoms to feeling of an insect entering her ear 1 year ago. Denies depression or anxiety at current time.

## 2020-10-08 NOTE — PLAN
[FreeTextEntry1] : 35F Lao-speaking with PMH E.coli sepsis w/ intubation 12/2019 2/2 pyelonephritis, self reported COVID infxn 3/2020 (+ antibody 6/2020) who presents for f/u of throat pain, dry/itchy eyes, rhinorrhea, ear pain x 1 yr in addition to dizziness, thought to be 2/2 allergic rhinitis/conjunctivitis given trial of OTC oral antihistamine (fexofenadine), nasal steroid (fluticasone). Pt followed by GI, ENT, neurology, ophtho, for continuing sx\par \par #ENT symptoms/headache\par - pt s/p trial of OTC oral antihistamine and nasal steroid with some improvement but not full\par - can continue use of above through fall prn as if allergic component may be exacerbated\par - pt advised to f/u neurology with appt scheduled 11/17\par - pt advised to f/u ENT for further evaluation of sx\par - pt w/u by ENT/GI in past for globus sensation, advised to f/u GI as below\par \par #Atypical Chest pain - not left sided or exertional. present since june\par -possibly 2/2 GERD, pt advised regarding dietary modification to reduce GERD sx\par - OTC pepcid - daily for 6 weeks and then prn\par - pt advised to f/u with GI\par -continue to monitor\par \par #Abd pain\par - likely 2/2 GERD, plan as above\par \par #Foot numbness\par - pt to f/u neuro\par - will ctm as pt without sx or signs on physical examination currently\par \par #HCM\par - pt to receive influenza vaccination at current visit\par \par Case and plan d/w Dr. Rogers\par \par

## 2020-10-08 NOTE — REVIEW OF SYSTEMS
[Fever] : no fever [Chills] : no chills [Shortness Of Breath] : no shortness of breath [FreeTextEntry3] : improved itching/dryness as per HPI [FreeTextEntry4] : persistent earache as per HPI, describes globus sensation [FreeTextEntry5] : chest pain as per HPI [FreeTextEntry7] : abd pain as per HPI [FreeTextEntry9] : +L foot intermittent numbness as per HPI [de-identified] : +headache, dizziness, L foot numnbess, as per HPI [de-identified] : Denies current anxiety, depression as per HPI

## 2020-10-08 NOTE — PHYSICAL EXAM
[Well Nourished] : well nourished [Well Developed] : well developed [Well-Appearing] : well-appearing [Normal Sclera/Conjunctiva] : normal sclera/conjunctiva [PERRL] : pupils equal round and reactive to light [EOMI] : extraocular movements intact [Normal Outer Ear/Nose] : the outer ears and nose were normal in appearance [Normal Oropharynx] : the oropharynx was normal [Normal TMs] : both tympanic membranes were normal [Supple] : supple [Thyroid Normal, No Nodules] : the thyroid was normal and there were no nodules present [No Respiratory Distress] : no respiratory distress  [Clear to Auscultation] : lungs were clear to auscultation bilaterally [Normal Rate] : normal rate  [Regular Rhythm] : with a regular rhythm [Normal S1, S2] : normal S1 and S2 [No Murmur] : no murmur heard [No Edema] : there was no peripheral edema [Soft] : abdomen soft [Non Tender] : non-tender [Non-distended] : non-distended [No HSM] : no HSM [Normal Posterior Cervical Nodes] : no posterior cervical lymphadenopathy [Normal Anterior Cervical Nodes] : no anterior cervical lymphadenopathy [Grossly Normal Strength/Tone] : grossly normal strength/tone [Speech Grossly Normal] : speech grossly normal [Memory Grossly Normal] : memory grossly normal [Alert and Oriented x3] : oriented to person, place, and time [de-identified] : +Pupils large [de-identified] : +anxious appearing at times [de-identified] : +tenderness to palpation of lower neck, describes globus sensation [de-identified] : BS hypoactive x 4 [de-identified] : without pain to palpation of temples [de-identified] : Equal sensation/strength of LE, patellar reflexes equal and intact [de-identified] : Pt anxious appearing at times as above

## 2020-10-15 ENCOUNTER — APPOINTMENT (OUTPATIENT)
Dept: OTOLARYNGOLOGY | Facility: CLINIC | Age: 35
End: 2020-10-15
Payer: COMMERCIAL

## 2020-10-15 VITALS
DIASTOLIC BLOOD PRESSURE: 79 MMHG | HEART RATE: 89 BPM | BODY MASS INDEX: 30.36 KG/M2 | HEIGHT: 62 IN | WEIGHT: 165 LBS | SYSTOLIC BLOOD PRESSURE: 115 MMHG

## 2020-10-15 DIAGNOSIS — M26.623 ARTHRALGIA OF BILATERAL TEMPOROMANDIBULAR JOINT: ICD-10-CM

## 2020-10-15 PROCEDURE — 99213 OFFICE O/P EST LOW 20 MIN: CPT

## 2020-10-15 NOTE — REVIEW OF SYSTEMS
[As Noted in HPI] : as noted in HPI [Dizziness] : dizziness [Ear Pain] : ear pain [Negative] : Psychiatric

## 2020-10-19 ENCOUNTER — APPOINTMENT (OUTPATIENT)
Dept: OTOLARYNGOLOGY | Facility: CLINIC | Age: 35
End: 2020-10-19
Payer: COMMERCIAL

## 2020-10-19 VITALS — SYSTOLIC BLOOD PRESSURE: 106 MMHG | HEART RATE: 86 BPM | DIASTOLIC BLOOD PRESSURE: 65 MMHG

## 2020-10-19 DIAGNOSIS — K21.9 GASTRO-ESOPHAGEAL REFLUX DISEASE W/OUT ESOPHAGITIS: ICD-10-CM

## 2020-10-19 PROCEDURE — 99214 OFFICE O/P EST MOD 30 MIN: CPT | Mod: 25

## 2020-10-19 PROCEDURE — 31575 DIAGNOSTIC LARYNGOSCOPY: CPT

## 2020-10-19 NOTE — HISTORY OF PRESENT ILLNESS
[de-identified] : 35F referred by Dr Lehman for throat pain. has hx of previous GI EGD in June 2020 wnl, bx showed chronic inactive gastritis of stomach. esophageal bx of distal and middle wnl. previously scoped by Dr Reed showed LPRD. has tried omeprazole in the past with dietary modifications with little benefit. says the pain is worse when lying down and in the mornings, with progressive improvement during the day. pain is not associated with meals, no other relieving or aggravating factors.

## 2020-10-19 NOTE — REASON FOR VISIT
[Initial Consultation] : an initial consultation for [Other: _____] : [unfilled] [[employer] ] : provided by [employer]   [Source: ______] : History obtained from [unfilled] [FreeTextEntry1] : [id number] [FreeTextEntry2] : No/Breanna [TWNoteComboBox1] : [place] Leonean

## 2020-10-20 NOTE — HISTORY OF PRESENT ILLNESS
[de-identified] : 35 year woman follow dizziness.\par Reports continues to have dizziness, improved since last visit. Continues to take vitamins and watching diet.\par Reports blurry vision when she has dizziness. \par Reports having intermittent bilateral otalgia, started last year, now radiating to jaw and throat.\par Reports intermittent bilateral tinnitus.\par Denies otorrhea, ear infections, hearing loss.

## 2020-10-20 NOTE — PROCEDURE
[Other ___] : [unfilled] [Same] : same as the Pre Op Dx. [] : Binocular Microscopy [FreeTextEntry1] : otalgia [FreeTextEntry4] : none [FreeTextEntry6] : Operative microscope was used to examine the ear canal, ear drum and visible middle ear landmarks. Adequate exam would not have been possible without the use of a microscope. Findings are described.\par \par

## 2020-10-20 NOTE — PHYSICAL EXAM
[de-identified] : + TTP [Hearing Loss Left Only] : normal [Hearing Loss Right Only] : normal [Normal] : no rashes

## 2020-10-30 NOTE — ED PROVIDER NOTE - GASTROINTESTINAL [+], MLM
I will refill the hydrocodone.  Can increase the Vyvanse up to 30 mg daily number 30 would still recommend getting in for the blood pressure check.   lower quadrants/ABDOMINAL PAIN

## 2020-11-02 DIAGNOSIS — M26.623 ARTHRALGIA OF BILATERAL TEMPOROMANDIBULAR JOINT: ICD-10-CM

## 2020-11-02 DIAGNOSIS — H92.09 OTALGIA, UNSPECIFIED EAR: ICD-10-CM

## 2020-11-02 DIAGNOSIS — K21.9 GASTRO-ESOPHAGEAL REFLUX DISEASE WITHOUT ESOPHAGITIS: ICD-10-CM

## 2020-11-04 ENCOUNTER — OUTPATIENT (OUTPATIENT)
Dept: OUTPATIENT SERVICES | Facility: HOSPITAL | Age: 35
LOS: 1 days | End: 2020-11-04
Payer: SELF-PAY

## 2020-11-04 ENCOUNTER — APPOINTMENT (OUTPATIENT)
Dept: INTERNAL MEDICINE | Facility: CLINIC | Age: 35
End: 2020-11-04

## 2020-11-04 VITALS
SYSTOLIC BLOOD PRESSURE: 110 MMHG | HEIGHT: 62 IN | WEIGHT: 160 LBS | BODY MASS INDEX: 29.44 KG/M2 | DIASTOLIC BLOOD PRESSURE: 70 MMHG

## 2020-11-04 DIAGNOSIS — H81.20 VESTIBULAR NEURONITIS, UNSPECIFIED EAR: ICD-10-CM

## 2020-11-04 DIAGNOSIS — I10 ESSENTIAL (PRIMARY) HYPERTENSION: ICD-10-CM

## 2020-11-04 DIAGNOSIS — R42 DIZZINESS AND GIDDINESS: ICD-10-CM

## 2020-11-04 PROCEDURE — G0463: CPT

## 2020-11-09 PROBLEM — R42 DIZZINESS: Status: ACTIVE | Noted: 2020-05-05

## 2020-11-09 PROBLEM — H81.20 VESTIBULAR NEURONITIS: Status: ACTIVE | Noted: 2020-08-06

## 2020-11-09 NOTE — REVIEW OF SYSTEMS
[Fever] : no fever [Chills] : no chills [Chest Pain] : no chest pain [Shortness Of Breath] : no shortness of breath [Dysuria] : no dysuria [Hematuria] : no hematuria [FreeTextEntry3] : +as per HPI [FreeTextEntry4] : +as per HPI [FreeTextEntry5] : + [FreeTextEntry7] : +as per HPI [FreeTextEntry9] : as per HPI [de-identified] : +as per HPI

## 2020-11-09 NOTE — PHYSICAL EXAM
[No Acute Distress] : no acute distress [Well Nourished] : well nourished [Well Developed] : well developed [Well-Appearing] : well-appearing [Normal Sclera/Conjunctiva] : normal sclera/conjunctiva [Normal Outer Ear/Nose] : the outer ears and nose were normal in appearance [Normal Oropharynx] : the oropharynx was normal [Normal TMs] : both tympanic membranes were normal [No Lymphadenopathy] : no lymphadenopathy [Supple] : supple [No Respiratory Distress] : no respiratory distress  [Clear to Auscultation] : lungs were clear to auscultation bilaterally [Normal Rate] : normal rate  [Regular Rhythm] : with a regular rhythm [Normal S1, S2] : normal S1 and S2 [No Murmur] : no murmur heard [Pedal Pulses Present] : the pedal pulses are present [No Edema] : there was no peripheral edema [Soft] : abdomen soft [Non Tender] : non-tender [Non-distended] : non-distended [Normal Bowel Sounds] : normal bowel sounds [Grossly Normal Strength/Tone] : grossly normal strength/tone [Alert and Oriented x3] : oriented to person, place, and time [de-identified] : +Pupils large, but equal, round, reactive to light [de-identified] : +external auditory canal with some erythema

## 2020-11-09 NOTE — ASSESSMENT
[FreeTextEntry1] : 34F Yi-speaking with PMH E. coli sepsis w/ intubation 12/2019 2/2 pyelonephritis, COVID infection, who presents for f/u of HA, dizziness, abd pain, visual disturbance, globus sensation, foot numnbess, and CP

## 2020-11-09 NOTE — HISTORY OF PRESENT ILLNESS
[FreeTextEntry1] : Follow up of HA, dizziness, abd pain, visual disturbance, globus sensation, foot numnbess, and CP [de-identified] : 34F Gibraltarian-speaking with PMH E. coli sepsis w/ intubation 12/2019 2/2 pyelonephritis, COVID infection, who presents for f/u of HA, dizziness, abd pain, visual disturbance, globus sensation, foot numbness, and CP. Of note pt seen by ENT x 2 since last visit, neurotology Dr. Lehman noted diziness improved s/p VRT, otalgia believed to be referred from HA, and recommended to c/w nortriptyline and consider Topamax, referred to general ENT Dr. Islas who noted sx c/w LPR and recommended esomeprazole 40 in am and famotidine 40 in evening, with dietary modifications and GI f/u. She reports feeling much better since last visit with us, reports persistent HA, dizziness, abd pain, visual disturbance (cloudiness), and globus sensation but all improved. She reports resolved foot numbness, CP. She denies fever, chills, SOB, back pain, dysuria, hematuria, diarrhea, melena, or hematochezia.

## 2020-11-09 NOTE — PLAN
[FreeTextEntry1] : #ENT sx/HA/Visual disturbance: Pt reports improved HA, dizziness, visual disturbance (seen by ophtho for sx), globus sensation since last visit\par - Seen by ENT in interim. Neurotology Dr. Lehman noted diziness improved s/p VRT, otalgia believed to be referred from HA, and recommended to c/w nortryptiline and consider topamax, referred to general ENT Dr. Islas who noted sx c/w LPR and recommended esomeprazole 40 in am and famotidine 40 in evening, with dietary modifications and GI f/u.\par - C/w esomeprazole 40 in am, famotidine 40 in pm, dietary modification as per ENT. \par - Pt to f/u w/ neurology in coming weeks\par \par #Atypical chest pain: resolved, ctm\par \par #Abd pain: Improved since last visit\par - c/w PPI and H2 blocker as above\par - advised to f/u w/ GI \par \par #Foot numbness: resolved, ctm\par \par #HCM: pt to f/u in 6 mo. unless sx worsen or new sx present

## 2020-11-11 DIAGNOSIS — K21.9 GASTRO-ESOPHAGEAL REFLUX DISEASE WITHOUT ESOPHAGITIS: ICD-10-CM

## 2020-11-11 DIAGNOSIS — H81.20 VESTIBULAR NEURONITIS, UNSPECIFIED EAR: ICD-10-CM

## 2020-11-11 DIAGNOSIS — R42 DIZZINESS AND GIDDINESS: ICD-10-CM

## 2020-11-13 NOTE — ED ADULT NURSE NOTE - [facility] ED NUR SYMPTOMS POS
[facility]: Health  Follow-Up    The history is provided by the patient.             Reason for review: Blood pressure at goal        Topics Covered on Call: physical activity and Diet    Additional Follow-up details: Patients says she is doing well and does not need anything or have any questions.  Her BP remains controlled.  She states she will try to take more consistent readings.            Diet-no change to diet    No change to diet.  Patient reports eating or drinking the following: Patient states she continues to limit salt intake and drinks at least 64 oz of water per day.      Physical Activity-no change to routine  No change to exercise routine.     Medication Adherence-Medication adherence was assessed.        Substance, Sleep, Stress-No change  stress-  Details:  Intervention(s):    Sleep-  Details:  Intervention(s):    Alcohol -  Details:  Intervention(s):    Tobacco-  Details:  Intervention(s):          Continue current diet/physical activity routine.       Addressed patient questions and patient has my contact information if needed prior to next outreach.   Explained the importance of self-monitoring and medication adherence. Encouraged the patient to communicate with their health  for lifestyle modifications to help improve or maintain a healthy lifestyle.               There are no preventive care reminders to display for this patient.      Last 5 Patient Entered Readings                                      Current 30 Day Average: 129/80     Recent Readings 11/6/2020 9/27/2020 9/24/2020 9/21/2020 9/6/2020    SBP (mmHg) 129 106 136 114 100    DBP (mmHg) 80 72 80 72 83    Pulse 68 69 73 76 75                BLURRED VISION

## 2020-11-17 ENCOUNTER — OUTPATIENT (OUTPATIENT)
Dept: OUTPATIENT SERVICES | Facility: HOSPITAL | Age: 35
LOS: 1 days | End: 2020-11-17
Payer: SELF-PAY

## 2020-11-17 ENCOUNTER — APPOINTMENT (OUTPATIENT)
Dept: NEUROLOGY | Facility: HOSPITAL | Age: 35
End: 2020-11-17
Payer: COMMERCIAL

## 2020-11-17 VITALS
HEIGHT: 62 IN | BODY MASS INDEX: 30.18 KG/M2 | WEIGHT: 164 LBS | HEART RATE: 80 BPM | TEMPERATURE: 97.3 F | DIASTOLIC BLOOD PRESSURE: 78 MMHG | SYSTOLIC BLOOD PRESSURE: 110 MMHG

## 2020-11-17 DIAGNOSIS — R56.9 UNSPECIFIED CONVULSIONS: ICD-10-CM

## 2020-11-17 PROCEDURE — G0463: CPT

## 2020-11-17 PROCEDURE — 99213 OFFICE O/P EST LOW 20 MIN: CPT | Mod: GC

## 2020-11-17 NOTE — HISTORY OF PRESENT ILLNESS
[FreeTextEntry1] : Interval History\par Patient presents for follow up visit of he headache and dizziness. Since last visit she reports significant improvement in vision and headaches on her medication regimen. However the patient reports persistence of dizziness. She states that VRT helped and showed improvement but the dizziness has not gone away and continues to bother her. She denies a room spinning sensation. She describes he dizziness as a feeling that her head is moving even when it is still. Patient had ENT appointments x2 since prior visit. She was seen by neurotology Dr. Lehman who noted that otalgia likely referred from her headache. Patient also had scope showing LPR and started on GI prophylaxis. Patient denies fever, chills, sob, cp. \par \par \par HPI\par Patient is a 35 year old woman with PMHx of Ecoli sepsis + intubation in 12/2019 from pyelonephritis, self-reported covid+ve in 3/2020 (mild sx), who had presented to Neurology clinic originally on 8/4/20 for vague bitemporal HA, paroxysmal vertigo (internal swaying) and blurry/darkening vision since March. Pt has had multiple ER visits for similar complaints, had MRI/MRA brain/neck at [place] which were negative, was also given sumatriptan 50 by PCP which she tried few times (unclear if used appropriately at onset) but discontinued seeing no benefit. She also reports feeling overwhelmed as she has been unable to work due to her vertigo and vision issues, causing marital and economical stress with a 2 year old child at home. She also was seen for panic attack and passive SI in ER, which improved and was cleared by psych for asked for outpt f/u which she has deferred for now. She was seen by ENT and their workup revealed evidence of central vestibulopathy but negative for peripheral causes of vertigo.\par Denies toxic habits, photo/phonophobia, focal numbness, weakness, LOC, bowel bladder issues. \par She denies family hx of migraines, aneurysm or strokes. Has used tylenol for OTC headache analgesia only

## 2020-11-17 NOTE — DISCUSSION/SUMMARY
[FreeTextEntry1] : Patient is a 35 year old woman with PMHx of Ecoli sepsis + intubation in 12/2019 from pyelonephritis, self-reported covid+ve in 3/2020 (mild sx), who had presented to Neurology clinic on 8/4/20 for vague bitemporal HA, paroxysmal vertigo (internal swaying) and blurry/darkening vision since March. Symptoms improved on nortryptline and magnesium\par \par Impression: Likely Vestibular migraine as etiology of her sx. Negative outside MRI/MRA and ENT w/u no indicative of peripheral type vertigo per [facility]. \par \par [] increase nortriptyline from 10mg qhs to 20mg qhs\par [] Continue Magnesium 400mg qd\par [] keep HA diary\par [] RTC in 6 months\par \par Pt d/w Dr Robson Zavaleta\par

## 2020-11-17 NOTE — REASON FOR VISIT
[Follow-Up: _____] : a [unfilled] follow-up visit [[employer] ] : provided by [employer]   [FreeTextEntry1] : [id number] [FreeTextEntry2] : Tanya [TWNoteComboBox1] : Wallis[place]

## 2020-11-17 NOTE — PHYSICAL EXAM
[FreeTextEntry1] : Gen: appears comfortable\par neck supple, full ROM, no central or midline tenderness\par AAOX3, speech fluent, rep/comp/nam intact, \par CN: EOMI, PERRLA, no facial asymmetry V1-V3 intact\par Motor: MAEx4\par no dysmetria F2N, H2S\par toes downgoing\par tandem gait wnl, romberg negative\par

## 2020-11-18 DIAGNOSIS — G43.809 OTHER MIGRAINE, NOT INTRACTABLE, WITHOUT STATUS MIGRAINOSUS: ICD-10-CM

## 2020-12-18 ENCOUNTER — APPOINTMENT (OUTPATIENT)
Dept: OPHTHALMOLOGY | Facility: CLINIC | Age: 35
End: 2020-12-18

## 2021-02-17 NOTE — ED PROVIDER NOTE - CHIEF COMPLAINT
Patient:   DESIREE RICHMOND            MRN: [id number]            FIN: [id number]               Age:   72 years     Sex:  Male     :  46   Associated Diagnoses:   None   Author:   Kaelyn Wadsworth NP      Basic Information   Time seen: Date 2017.   History source: Patient, family, .   Arrival mode: Private vehicle, walking.   History limitation: None.      History of Present Illness   72-year-old male with history of GERD, diabetes, gout, high cholesterol, and fatty liver presents to ED with right knee pain for the last 2 years.  Patient is in this ED today asking if he needs surgery.  Patient denies any recent falls or injury, denies any recent fevers.  Patient states he has been able to ambulate using his cane for many years now.  Patient also complains of right index finger cut that he sustained 2 days ago after slamming his finger onto a car door.  Patient states he has been washing wound however he would like to have it checked if it is infected.  Patient states his last tetanus vaccine was 2 years ago.      Review of Systems   Constitutional symptoms:  No fever, no chills.    Skin symptoms:  right index finger laceration, No rash,    Eye symptoms:  Vision unchanged.   ENMT symptoms:  No sore throat,    Respiratory symptoms:  No shortness of breath, no cough.    Cardiovascular symptoms:  No chest pain,    Gastrointestinal symptoms:  No abdominal pain, no nausea, no vomiting, no diarrhea.    Genitourinary symptoms:  No dysuria, no hematuria.    Musculoskeletal symptoms:  Joint pain, right knee, no back pain, no Muscle pain.    Neurologic symptoms:  No headache, no dizziness, no altered level of consciousness, no numbness, no tingling, no weakness.    Psychiatric symptoms:  No anxiety, no depression.       Health Status   Allergies:    Allergic Reactions (Selected)  NKA.   Immunizations: Up to date, tetanus .      Past Medical/ Family/ Social History      Medical history  The patient is a 35y Female complaining of   Cardiovascular: hyperlipidemia.   Endocrine: diabetes.   Gastrointestinal: gastroesophageal reflux, fatty liver.   Musculoskeletal: gout.   Surgical history: Left knee surgery from arthritis 2015 @ [facility].   Social history: Tobacco use: Denies.      Physical Examination               Vital Signs   Vital Signs   04/28/17 18:51           Peripheral Pulse Rate     67 bpm                             Respiratory Rate          18 br/min                             Systolic Blood Pressure   134 mmHg                             Diastolic Blood Pressure  74 mmHg                             Oxygen Saturation         97 %    04/28/17 17:36           Temperature Oral          98.0 F                             Peripheral Pulse Rate     70 bpm                             Respiratory Rate          18 br/min                             Systolic Blood Pressure   143 mmHg  HI                             Diastolic Blood Pressure  80 mmHg                             Mean Arterial Pressure    101 mmHg                             Oxygen Saturation         96 %  .   Results   04/28/17 17:36           Oxygen Saturation         96 %  .   Pulse Ox > 95% on Room Air which is normal for this patient.   General:  Alert, no acute distress.    Skin:  Warm, pink, intact, moist, No erythema or ecchymosis noted to the right knee, palmar aspect of right index finger 1cm linear laceration. No blood/discharge noted, no surrounding erythema noted, no nail injury noted. .    Head:  Normocephalic, atraumatic.    Neck:  Supple, no tenderness.    Eye:  Pupils are equal, round and reactive to light, extraocular movements are intact, normal conjunctiva.    Ears, nose, mouth and throat:  Oral mucosa moist, no pharyngeal erythema or exudate.    Cardiovascular:  Regular rate and rhythm, Normal peripheral perfusion, No edema.    Respiratory:  Lungs are clear to auscultation, respirations are non-labored, breath sounds are equal, Symmetrical chest wall expansion.     Chest wall:  No tenderness, No deformity.    Back:  Nontender, Normal range of motion, Normal alignment, no step-offs.    Musculoskeletal:  Normal ROM, normal strength, no tenderness, no swelling, Lower extremity: Right, anterior, knee, tenderness, range of motion normal, No tenderness around right knee joint palpation, patient states pain is only present upon ambulation., no swelling, no erythema, no ecchymosis, Ankle/foot: Bilateral, No pedal edema noted, bilateral pedal pulses 3+., no swelling.    Gastrointestinal:  Soft, Nontender, Non distended, Normal bowel sounds.    Neurological:  Alert and oriented to person, place, time, and situation, No focal neurological deficit observed, normal sensory observed, normal motor observed, normal speech observed, normal coordination observed.    Lymphatics:  No lymphadenopathy.   Psychiatric:  Cooperative, appropriate mood & affect.       Medical Decision Making   Differential Diagnosis:  Cellulitis, arthritis, degenerative joint disease, gout, sprain, strain, bursitis.    72-year-old male with multiple medical history presents to ED with a right knee pain for 2 years.  Patient is asking if he needs to have surgery on his right knee.  Patient states he has had left knee surgery in 2015 and feels that his right knee might need the same thing as well.  Patient reports he has been getting knee injections for the last 2 years however his insurance started denying medication.  Patient also states he is unable to see his primary doctor due to insurance reason as well.  Patient denies any recent falls or injury, denies any recent fevers.  Patient states pain is only present when ambulating and feels his bones grinding.  Patient also states that he jammed his right index finger 2 days ago in a car door.  Patient states he has a cut on the palmar aspect of the right index finger and is having some right index finger pain.  Upon examination patient appears well and in no acute  distress, ambulates with a cane that he has been using for many years.  Examination as above, right knee normal examination noted.  No swelling, ecchymosis, warmth, or erythema noted to right knee.  Right index finger with a 1 cm linear laceration to palmar aspect of finger.  Full range of motion to finger, no active bleeding or discharge noted no nail injury noted.  Explained to patient unable to suture finger since it has been more than 6 hours, however we will check x-ray for any fractures finger.  Explained to patient importance to follow-up with orthopaedic doctor due to chronic knee pain.  Advised patient orthopaedic doctor will make the decision whether or not he needs knee surgery.  Explained to patient and family low index of suspicion for joint infection or DVT since examination is normal as above.  Explained to patient and family will hold off any diagnostic imaging to right knee until he is seen by an orthopaedic doctor, since he has not had any acute injury to right knee, x-ray is not warranted at this time since there is low suspicion for knee fracture.  Patient verbalizes understanding, all questions answered via .      Reexamination/ Reevaluation   1830: Reexamined patient, feels okay.  He was given ibuprofen.  Explained to patient and family x-ray of right finger is negative for any fractures or dislocation.  Advised patient and family to wash wound thoroughly, wound care instructions given.  Also explained to patient and family or so referral will be given and to check if M.D. will cover insurance.  Also explained to patient and family ibuprofen as well as Lidoderm patch offered for pain control, patient and family agree with treatment plan.  Also explained to patient and family will give antibiotics to prevent further infection to right index finger.  Patient and family verbalizes understanding, all questions answered via .  Patient continues to be neurologically intact,  ambulating with steady gait using cane.  Patient is stable for discharge.      Impression and Plan   Diagnosis   Right knee pain   Right index finger laceration   Plan   Condition: Stable.    Disposition: Discharged: to home.    Prescriptions: Launch prescriptions   Pharmacy:  Lidoderm 5% topical film (Prescribe): 1 patch, Topical, qDay, Right knee pain  remove patches after 12 hours, PRN:  Pain, 10 patch, 0 Refill(s)  ibuprofen 600 mg oral tablet (Prescribe): 600 mg, 1 tab, PO, q8hr, PRN:  Pain, 30 tab, 0 Refill(s), Launch prescriptions   Pharmacy:  Keflex 500 mg oral capsule (Prescribe): 500 mg, 1 cap(s), PO, QID, for 7 day(s), Right index finger laceration, 28 cap(s), 0 Refill(s).    Patient was given the following educational materials: Wound Care, Easy-to-Read(Turkish), Arthritis, Nonspecific, Easy-to-Read(Turkish).    Follow up with: Mendel Barreto Call for follow up appointment  Return if symptoms worsen.    Counseled: Patient, Family, Regarding diagnosis, Regarding diagnostic results, Regarding treatment plan, Regarding prescription, .

## 2021-04-01 ENCOUNTER — APPOINTMENT (OUTPATIENT)
Dept: OPHTHALMOLOGY | Facility: CLINIC | Age: 36
End: 2021-04-01

## 2021-04-13 ENCOUNTER — OUTPATIENT (OUTPATIENT)
Dept: OUTPATIENT SERVICES | Facility: HOSPITAL | Age: 36
LOS: 1 days | End: 2021-04-13
Payer: SELF-PAY

## 2021-04-13 ENCOUNTER — APPOINTMENT (OUTPATIENT)
Dept: GASTROENTEROLOGY | Facility: HOSPITAL | Age: 36
End: 2021-04-13

## 2021-04-13 VITALS
HEART RATE: 84 BPM | SYSTOLIC BLOOD PRESSURE: 109 MMHG | TEMPERATURE: 98.7 F | DIASTOLIC BLOOD PRESSURE: 72 MMHG | WEIGHT: 168 LBS | BODY MASS INDEX: 30.91 KG/M2 | RESPIRATION RATE: 14 BRPM | HEIGHT: 62 IN

## 2021-04-13 DIAGNOSIS — R10.9 UNSPECIFIED ABDOMINAL PAIN: ICD-10-CM

## 2021-04-13 DIAGNOSIS — K21.9 GASTRO-ESOPHAGEAL REFLUX DISEASE W/OUT ESOPHAGITIS: ICD-10-CM

## 2021-04-13 DIAGNOSIS — E66.9 OBESITY, UNSPECIFIED: ICD-10-CM

## 2021-04-13 DIAGNOSIS — K21.9 GASTRO-ESOPHAGEAL REFLUX DISEASE WITHOUT ESOPHAGITIS: ICD-10-CM

## 2021-04-13 PROCEDURE — G0463: CPT

## 2021-04-13 NOTE — HISTORY OF PRESENT ILLNESS
[Heartburn] : stable heartburn [Nausea] : denies nausea [Vomiting] : denies vomiting [Diarrhea] : denies diarrhea [Constipation] : denies constipation [Yellow Skin Or Eyes (Jaundice)] : denies jaundice [Abdominal Pain] : denies abdominal pain [Abdominal Swelling] : denies abdominal swelling [de-identified] : 34F Zimbabwean-speaking with PMH E. coli sepsis w/ intubation 12/2019 2/2 pyelonephritis, COVID infection, recently diagnosed LPR in 10/2020 (on esomeprazole 40mg daily and Famotidine 40mg bedtime since then) presents as a referral for sore throat.\par \par Patient states that she has been having sore throat and globus sensation for the past year. Patient had an EGD in 06/2020 that was negative including esophageal and gastric biopsies. Patient was then referred to ENT who scoped here and diagnosed her with LPR. Pt states that since being on PPI daily and Famotidine 40mg, patient endorses significant improvement in her symptoms. Pt still  sometimes have sore throat; This is not associated with eating, dysphagia, odynophagia, nausea, vomiting, abd pain. Pt currently also takes Nortriptyline for HA.

## 2021-04-13 NOTE — ASSESSMENT
[FreeTextEntry1] : Impression\par # Sorethroat- S/P Negative EGD in 06/2020 and laryngoscopy revealing LPR, improved on PPI 40mg daily and H2 blocker 40mg at night\par # Obesity\par \par Recommendations:\par -Will refill PPI and famotidine; Patient advised to decrease the dose of PPI to 20mg if continues to experience improvement in symptoms, with the goal of eliminating PPI all together. At this time, pt would like to continue with current regimen for now. Discussed long term risks of PPI including osteoporosis\par -Dietary strategies discussed with the patient including use of psyllium husk before breakfast and supper and Glucerna in place of lunch; mild exercise also discussed; weight once weekly; think of calory spending\par \par D/W Dr Sandoval.

## 2021-04-13 NOTE — PHYSICAL EXAM
[General Appearance - Alert] : alert [General Appearance - Well Nourished] : well nourished [General Appearance - Well Developed] : well developed [Sclera] : the sclera and conjunctiva were normal [Extraocular Movements] : extraocular movements were intact [Outer Ear] : the ears and nose were normal in appearance [Hearing Threshold Finger Rub Not [place]] : hearing was normal [Neck Appearance] : the appearance of the neck was normal [Neck Cervical Mass (___cm)] : no neck mass was observed [Respiration, Rhythm And Depth] : normal respiratory rhythm and effort [Exaggerated Use Of Accessory Muscles For Inspiration] : no accessory muscle use [Heart Sounds] : normal S1 and S2 [Murmurs] : no murmurs [Bowel Sounds] : normal bowel sounds [Abdomen Soft] : soft [Abdomen Tenderness] : non-tender [Cervical Lymph Nodes Enlarged Posterior Bilaterally] : posterior cervical [Cervical Lymph Nodes Enlarged Anterior Bilaterally] : anterior cervical [Nail Clubbing] : no clubbing  or cyanosis of the fingernails [Motor Tone] : muscle strength and tone were normal [] : no rash [Skin Lesions] : no skin lesions [Sensation] : the sensory exam was normal to light touch and pinprick [Motor Exam] : the motor exam was normal [Affect] : the affect was normal [Mood] : the mood was normal

## 2021-04-13 NOTE — HISTORY OF PRESENT ILLNESS
[Heartburn] : stable heartburn [Nausea] : denies nausea [Vomiting] : denies vomiting [Diarrhea] : denies diarrhea [Constipation] : denies constipation [Yellow Skin Or Eyes (Jaundice)] : denies jaundice [Abdominal Pain] : denies abdominal pain [Abdominal Swelling] : denies abdominal swelling [de-identified] : 34F Latvian-speaking with PMH E. coli sepsis w/ intubation 12/2019 2/2 pyelonephritis, COVID infection, recently diagnosed LPR in 10/2020 (on esomeprazole 40mg daily and Famotidine 40mg bedtime since then) presents as a referral for sore throat.\par \par Patient states that she has been having sore throat and globus sensation for the past year. Patient had an EGD in 06/2020 that was negative including esophageal and gastric biopsies. Patient was then referred to ENT who scoped here and diagnosed her with LPR. Pt states that since being on PPI daily and Famotidine 40mg, patient endorses significant improvement in her symptoms. Pt still  sometimes have sore throat; This is not associated with eating, dysphagia, odynophagia, nausea, vomiting, abd pain. Pt currently also takes Nortriptyline for HA.

## 2021-04-22 ENCOUNTER — OUTPATIENT (OUTPATIENT)
Dept: OUTPATIENT SERVICES | Facility: HOSPITAL | Age: 36
LOS: 1 days | End: 2021-04-22
Payer: SELF-PAY

## 2021-04-22 ENCOUNTER — APPOINTMENT (OUTPATIENT)
Dept: INTERNAL MEDICINE | Facility: CLINIC | Age: 36
End: 2021-04-22

## 2021-04-22 VITALS
SYSTOLIC BLOOD PRESSURE: 120 MMHG | BODY MASS INDEX: 30.91 KG/M2 | WEIGHT: 168 LBS | HEIGHT: 62 IN | DIASTOLIC BLOOD PRESSURE: 70 MMHG

## 2021-04-22 DIAGNOSIS — R07.0 PAIN IN THROAT: ICD-10-CM

## 2021-04-22 DIAGNOSIS — O35.9XX0 MATERNAL CARE FOR (SUSPECTED) FETAL ABNORMALITY AND DAMAGE, UNSPECIFIED, NOT APPLICABLE OR UNSPECIFIED: ICD-10-CM

## 2021-04-22 DIAGNOSIS — Z01.818 ENCOUNTER FOR OTHER PREPROCEDURAL EXAMINATION: ICD-10-CM

## 2021-04-22 DIAGNOSIS — Z11.59 ENCOUNTER FOR SCREENING FOR OTHER VIRAL DISEASES: ICD-10-CM

## 2021-04-22 DIAGNOSIS — R09.89 OTHER SPECIFIED SYMPTOMS AND SIGNS INVOLVING THE CIRCULATORY AND RESPIRATORY SYSTEMS: ICD-10-CM

## 2021-04-22 DIAGNOSIS — R42 DIZZINESS AND GIDDINESS: ICD-10-CM

## 2021-04-22 DIAGNOSIS — H81.4 VERTIGO OF CENTRAL ORIGIN: ICD-10-CM

## 2021-04-22 DIAGNOSIS — I10 ESSENTIAL (PRIMARY) HYPERTENSION: ICD-10-CM

## 2021-04-22 DIAGNOSIS — H04.129 DRY EYE SYNDROME OF UNSPECIFIED LACRIMAL GLAND: ICD-10-CM

## 2021-04-22 DIAGNOSIS — Z86.69 PERSONAL HISTORY OF OTHER DISEASES OF THE NERVOUS SYSTEM AND SENSE ORGANS: ICD-10-CM

## 2021-04-22 DIAGNOSIS — O09.93 SUPERVISION OF HIGH RISK PREGNANCY, UNSPECIFIED, THIRD TRIMESTER: ICD-10-CM

## 2021-04-22 DIAGNOSIS — Z20.822 CONTACT WITH AND (SUSPECTED) EXPOSURE TO COVID-19: ICD-10-CM

## 2021-04-22 DIAGNOSIS — E66.9 OBESITY, UNSPECIFIED: ICD-10-CM

## 2021-04-22 DIAGNOSIS — R07.89 OTHER CHEST PAIN: ICD-10-CM

## 2021-04-22 PROCEDURE — G0463: CPT

## 2021-04-23 NOTE — ASSESSMENT
[FreeTextEntry1] : Pt is a 33yo F w/ PMH E. coli sepsis w/ intubation 12/2019 2/2 pyelonephritis, COVID infection, who presents for f/u of HA, and dizziness. \par \par #Central Vestibulopathy\par - Pt w/ previous w/u w/ ENT and neurology and this was dx. Symptoms may be related to her migraines vs another central cause. Pt advised to follow up w/ Neurology next week regarding symptoms and possible medication adjustment/titration for migraines\par - Pt expressed understanding and will plan to follow up w/ neurology\par \par RTC for CPE \par \par Case d/w Dr. Ruiz

## 2021-04-23 NOTE — HISTORY OF PRESENT ILLNESS
[FreeTextEntry1] : Follow up  [de-identified] : Pt is a 33yo F w/ PMH E. coli sepsis w/ intubation 12/2019 2/2 pyelonephritis, COVID infection, who presents for f/u of HA, and dizziness. \par \par Pt states for the past year she has been experiencing vertigo. Sensation though is described as a feeling of unsteadiness room spinning when she is sitting at rest. Pt endorses improvement in symptoms when she is ambulating or changing positions. Of note she was previously seen by ENT and neurology w/ dx of central vestibular vertigo. She follows w/ neurology w/ appt upcoming next week. She is currently on Nortriptyline for HAs.

## 2021-04-23 NOTE — REVIEW OF SYSTEMS
[Dizziness] : dizziness [Negative] : Psychiatric [Headache] : no headache [Fainting] : no fainting [Memory Loss] : no memory loss [Unsteady Walking] : no ataxia

## 2021-04-29 DIAGNOSIS — H81.4 VERTIGO OF CENTRAL ORIGIN: ICD-10-CM

## 2021-05-04 ENCOUNTER — APPOINTMENT (OUTPATIENT)
Dept: NEUROLOGY | Facility: HOSPITAL | Age: 36
End: 2021-05-04

## 2021-05-04 ENCOUNTER — OUTPATIENT (OUTPATIENT)
Dept: OUTPATIENT SERVICES | Facility: HOSPITAL | Age: 36
LOS: 1 days | End: 2021-05-04
Payer: SELF-PAY

## 2021-05-04 VITALS
TEMPERATURE: 96.7 F | WEIGHT: 164 LBS | BODY MASS INDEX: 30.18 KG/M2 | DIASTOLIC BLOOD PRESSURE: 76 MMHG | SYSTOLIC BLOOD PRESSURE: 109 MMHG | HEIGHT: 62 IN | HEART RATE: 73 BPM

## 2021-05-04 DIAGNOSIS — G43.809 OTHER MIGRAINE, NOT INTRACTABLE, WITHOUT STATUS MIGRAINOSUS: ICD-10-CM

## 2021-05-04 DIAGNOSIS — H02.409 UNSPECIFIED PTOSIS OF UNSPECIFIED EYELID: ICD-10-CM

## 2021-05-04 DIAGNOSIS — R51.9 HEADACHE, UNSPECIFIED: ICD-10-CM

## 2021-05-04 LAB — TSH SERPL-MCNC: 2.25 UIU/ML — SIGNIFICANT CHANGE UP (ref 0.27–4.2)

## 2021-05-04 PROCEDURE — 84443 ASSAY THYROID STIM HORMONE: CPT

## 2021-05-04 PROCEDURE — 86366 MUSCLE-SPECIFIC KINASE ANTB: CPT

## 2021-05-04 PROCEDURE — 86041 ACETYLCHOLN RCPTR BNDNG ANTB: CPT

## 2021-05-04 PROCEDURE — G0463: CPT

## 2021-05-04 PROCEDURE — 86042 ACETYLCHOLN RCPTR BLCKG ANTB: CPT

## 2021-05-04 PROCEDURE — 86043 ACETYLCHOLN RCPTR MODLG ANTB: CPT

## 2021-05-04 NOTE — REASON FOR VISIT
[Follow-Up: _____] : a [unfilled] follow-up visit [[employer] ] : provided by [employer]   [FreeTextEntry3] : [employer]  ID [id number] [TWNoteComboBox1] : [place]olan

## 2021-05-04 NOTE — PHYSICAL EXAM
[FreeTextEntry1] : Gen: appears comfortable\par neck supple, full ROM, no central or midline tenderness\par AAOX3, speech fluent, rep/comp/nam intact, \par CN: EOMI, PERRLA, no facial asymmetry V1-V3 intact, no eyelid fatiguing upon persistent upgaze\par Motor: MAEx4\par no dysmetria F2N, H2S\par toes downgoing\par tandem gait wnl, romberg negative\par

## 2021-05-04 NOTE — HISTORY OF PRESENT ILLNESS
[FreeTextEntry1] : HPI 5/4/2021\par 34yo woman w/ h/o covid19 3/2020 with resulting dysequilibrium and vestibular migraine presenting as follow up to Neurology Clinic for dysequilibrium, vision changes, headaches. Dennis  Nelly [id number] used for interpretation. She reports that since the last visit, her headaches have improved and ran out of nortriptyline but has head well-controlled headaches. She reports that she has dry eyes and when she last saw her eye doctor she was prescribed eye drops and was told her vision is improving. She reports that intermittently especially after 3p she feels she feels her eyelids becoming heavy and painful and associated loss of vision which is transient. This has been worked up in the past with neuroimaging and has been negative. She denies recent fevers, chills, nausea, vomiting, dysuria, diarrhea, URI symptoms, recent head or neck trauma, loss of consciousness. She reports she has had intermittent shortness of breath that she notices when she sings. \par \par Interval History 11/2020\par Patient presents for follow up visit of he headache and dizziness. Since last visit she reports significant improvement in vision and headaches on her medication regimen. However the patient reports persistence of dizziness. She states that VRT helped and showed improvement but the dizziness has not gone away and continues to bother her. She denies a room spinning sensation. She describes he dizziness as a feeling that her head is moving even when it is still. Patient had ENT appointments x2 since prior visit. She was seen by neurotology Dr. Lehman who noted that otalgia likely referred from her headache. Patient also had scope showing LPR and started on GI prophylaxis. Patient denies fever, chills, sob, cp. \par \par \par HPI\par Patient is a 35 year old woman with PMHx of Ecoli sepsis + intubation in 12/2019 from pyelonephritis, self-reported covid+ve in 3/2020 (mild sx), who had presented to Neurology clinic originally on 8/4/20 for vague bitemporal HA, paroxysmal vertigo (internal swaying) and blurry/darkening vision since March. Pt has had multiple ER visits for similar complaints, had MRI/MRA brain/neck at [facility] which were negative, was also given sumatriptan 50 by PCP which she tried few times (unclear if used appropriately at onset) but discontinued seeing no benefit. She also reports feeling overwhelmed as she has been unable to work due to her vertigo and vision issues, causing marital and economical stress with a 2 year old child at home. She also was seen for panic attack and passive SI in ER, which improved and was cleared by psych for asked for outpt f/u which she has deferred for now. She was seen by ENT and their workup revealed evidence of central vestibulopathy but negative for peripheral causes of vertigo.\par Denies toxic habits, photo/phonophobia, focal numbness, weakness, LOC, bowel bladder issues. \par She denies family hx of migraines, aneurysm or strokes. Has used tylenol for OTC headache analgesia only

## 2021-05-04 NOTE — DISCUSSION/SUMMARY
[FreeTextEntry1] : Assessment: 36yo woman w/ h/o covid19 3/2020 with resulting dysequilibrium and vestibular migraine presenting as follow up to Neurology Clinic for dysequilibrium, vision changes, headaches. [place]  [place] [phone] used for interpretation. At this time patient is complaining of dysequilibrium and intermittent eyelid heaviness with unclear transient visual changes, as well as noting shortness of breath with limitation in singing.\par \par Impression: known vestibular migraine with concerning symptoms of eyelid heaviness, unclear transient visual changes, and intermittent shortness of breath especially with exertional activity such as singing, rule out myasethenia gravis\par \par [] continue with nortriptyline 20mg qhs - refill sent\par [] Continue Magnesium 400mg qd\par [] keep HA diary\par [] vestibular rehab referral sent for help with dysequilibrium\par [] f/u labwork: acetylcholine binding, blocking, modulating antibodies, MUSK antibody, TSH - if positive, may need sooner follow up\par [] RTC in 6 months\par \par plan d/w Neurology Attending Dr. Nissenbaum\par \par

## 2021-05-07 LAB — ACHR BLOCK AB SER-ACNC: 17 % — SIGNIFICANT CHANGE UP (ref 0–25)

## 2021-05-10 LAB — ACRM MODULATING ANTIBODY: <0.03 NMOL/L — SIGNIFICANT CHANGE UP (ref 0–0.24)

## 2021-05-11 ENCOUNTER — NON-APPOINTMENT (OUTPATIENT)
Age: 36
End: 2021-05-11

## 2021-05-13 LAB
ACHR MOD AB SER-ACNC: <12 % — SIGNIFICANT CHANGE UP (ref 0–20)
MUSK IGG SER IA-MCNC: <1 U/ML — SIGNIFICANT CHANGE UP

## 2021-05-18 ENCOUNTER — NON-APPOINTMENT (OUTPATIENT)
Age: 36
End: 2021-05-18

## 2021-05-25 ENCOUNTER — APPOINTMENT (OUTPATIENT)
Dept: INTERNAL MEDICINE | Facility: CLINIC | Age: 36
End: 2021-05-25

## 2021-05-27 ENCOUNTER — LABORATORY RESULT (OUTPATIENT)
Age: 36
End: 2021-05-27

## 2021-05-27 ENCOUNTER — OUTPATIENT (OUTPATIENT)
Dept: OUTPATIENT SERVICES | Facility: HOSPITAL | Age: 36
LOS: 1 days | End: 2021-05-27
Payer: SELF-PAY

## 2021-05-27 ENCOUNTER — APPOINTMENT (OUTPATIENT)
Dept: INTERNAL MEDICINE | Facility: CLINIC | Age: 36
End: 2021-05-27

## 2021-05-27 VITALS
OXYGEN SATURATION: 97 % | HEIGHT: 62 IN | BODY MASS INDEX: 30.18 KG/M2 | HEART RATE: 86 BPM | WEIGHT: 164 LBS | DIASTOLIC BLOOD PRESSURE: 70 MMHG | SYSTOLIC BLOOD PRESSURE: 112 MMHG

## 2021-05-27 DIAGNOSIS — I10 ESSENTIAL (PRIMARY) HYPERTENSION: ICD-10-CM

## 2021-05-27 PROCEDURE — 85027 COMPLETE CBC AUTOMATED: CPT

## 2021-05-27 PROCEDURE — 80061 LIPID PANEL: CPT

## 2021-05-27 PROCEDURE — 83036 HEMOGLOBIN GLYCOSYLATED A1C: CPT

## 2021-05-27 PROCEDURE — G0463: CPT

## 2021-05-27 PROCEDURE — 80053 COMPREHEN METABOLIC PANEL: CPT

## 2021-05-28 LAB
A1C WITH ESTIMATED AVERAGE GLUCOSE RESULT: 5 % — SIGNIFICANT CHANGE UP (ref 4–5.6)
ALBUMIN SERPL ELPH-MCNC: 4.8 G/DL — SIGNIFICANT CHANGE UP (ref 3.3–5)
ALP SERPL-CCNC: 79 U/L — SIGNIFICANT CHANGE UP (ref 40–120)
ALT FLD-CCNC: 25 U/L — SIGNIFICANT CHANGE UP (ref 10–45)
ANION GAP SERPL CALC-SCNC: 14 MMOL/L — SIGNIFICANT CHANGE UP (ref 5–17)
AST SERPL-CCNC: 20 U/L — SIGNIFICANT CHANGE UP (ref 10–40)
BILIRUB SERPL-MCNC: 0.3 MG/DL — SIGNIFICANT CHANGE UP (ref 0.2–1.2)
BUN SERPL-MCNC: 11 MG/DL — SIGNIFICANT CHANGE UP (ref 7–23)
CALCIUM SERPL-MCNC: 10 MG/DL — SIGNIFICANT CHANGE UP (ref 8.4–10.5)
CHLORIDE SERPL-SCNC: 103 MMOL/L — SIGNIFICANT CHANGE UP (ref 96–108)
CHOLEST SERPL-MCNC: 202 MG/DL — HIGH
CO2 SERPL-SCNC: 22 MMOL/L — SIGNIFICANT CHANGE UP (ref 22–31)
CREAT SERPL-MCNC: 0.64 MG/DL — SIGNIFICANT CHANGE UP (ref 0.5–1.3)
ESTIMATED AVERAGE GLUCOSE: 97 MG/DL — SIGNIFICANT CHANGE UP (ref 68–114)
GLUCOSE SERPL-MCNC: 91 MG/DL — SIGNIFICANT CHANGE UP (ref 70–99)
HCT VFR BLD CALC: 40.4 % — SIGNIFICANT CHANGE UP (ref 34.5–45)
HDLC SERPL-MCNC: 68 MG/DL — SIGNIFICANT CHANGE UP
HGB BLD-MCNC: 14.2 G/DL — SIGNIFICANT CHANGE UP (ref 11.5–15.5)
LIPID PNL WITH DIRECT LDL SERPL: 105 MG/DL — HIGH
MCHC RBC-ENTMCNC: 34.9 PG — HIGH (ref 27–34)
MCHC RBC-ENTMCNC: 35.1 GM/DL — SIGNIFICANT CHANGE UP (ref 32–36)
MCV RBC AUTO: 99.3 FL — SIGNIFICANT CHANGE UP (ref 80–100)
NON HDL CHOLESTEROL: 135 MG/DL — HIGH
PLATELET # BLD AUTO: 236 K/UL — SIGNIFICANT CHANGE UP (ref 150–400)
POTASSIUM SERPL-MCNC: 4.3 MMOL/L — SIGNIFICANT CHANGE UP (ref 3.5–5.3)
POTASSIUM SERPL-SCNC: 4.3 MMOL/L — SIGNIFICANT CHANGE UP (ref 3.5–5.3)
PROT SERPL-MCNC: 7.4 G/DL — SIGNIFICANT CHANGE UP (ref 6–8.3)
RBC # BLD: 4.07 M/UL — SIGNIFICANT CHANGE UP (ref 3.8–5.2)
RBC # FLD: 11.9 % — SIGNIFICANT CHANGE UP (ref 10.3–14.5)
SODIUM SERPL-SCNC: 140 MMOL/L — SIGNIFICANT CHANGE UP (ref 135–145)
TRIGL SERPL-MCNC: 149 MG/DL — SIGNIFICANT CHANGE UP
WBC # BLD: 6.45 K/UL — SIGNIFICANT CHANGE UP (ref 3.8–10.5)
WBC # FLD AUTO: 6.45 K/UL — SIGNIFICANT CHANGE UP (ref 3.8–10.5)

## 2021-06-01 NOTE — REVIEW OF SYSTEMS
[Dryness] : dryness  [Vision Problems] : vision problems [Headache] : headache [Dizziness] : dizziness [Negative] : Psychiatric [Discharge] : no discharge [Pain] : no pain [Fainting] : no fainting [Confusion] : no confusion [Memory Loss] : no memory loss [Unsteady Walking] : no ataxia [FreeTextEntry4] : + headaches  [FreeTextEntry2] : + dizziness

## 2021-06-01 NOTE — HEALTH RISK ASSESSMENT
[Fair] : ~his/her~ current health as fair  [Good] : ~his/her~  mood as  good [0-5] : 0-5 [No] : No [Never (0 pts)] : Never (0 points) [No falls in past year] : Patient reported no falls in the past year [0] : 2) Feeling down, depressed, or hopeless: Not at all (0) [Patient reported PAP Smear was normal] : Patient reported PAP Smear was normal [With Significant Other] : lives with significant other [Employed] : employed [] :  [Sexually Active] : sexually active [Feels Safe at Home] : Feels safe at home [Fully functional (bathing, dressing, toileting, transferring, walking, feeding)] : Fully functional (bathing, dressing, toileting, transferring, walking, feeding) [Fully functional (using the telephone, shopping, preparing meals, housekeeping, doing laundry, using] : Fully functional and needs no help or supervision to perform IADLs (using the telephone, shopping, preparing meals, housekeeping, doing laundry, using transportation, managing medications and managing finances) [Reports changes in vision] : Reports changes in vision [Audit-CScore] : 0 [] : No [[id number]] : 0 [Change in mental status noted] : No change in mental status noted [Language] : denies difficulty with language [Behavior] : denies difficulty with behavior [Learning/Retaining New Information] : denies difficulty learning/retaining new information [Handling Complex Tasks] : denies difficulty handling complex tasks [Reasoning] : denies difficulty with reasoning [High Risk Behavior] : no high risk behavior [Reports changes in hearing] : Reports no changes in hearing [PapSmearDate] : 01/20

## 2021-06-01 NOTE — HISTORY OF PRESENT ILLNESS
[[employer] ] : provided by [employer]   [Time Spent: ____ minutes] : I have spent [unfilled] minutes of time on the encounter. The patient's primary language is not English thus required  services. [FreeTextEntry1] : [id number] [de-identified] : Ms. Brie Key is a 35 year old female presenting for a CPE. \par \par #Headaches and vertigo\par Since she had covid in March 2020, she has been experiencing vertigo and headaches. She has seen both ENT and neurology, and has been on nortriptyline and magnesium. She feels that the headaches are improving but the dizziness remains. She experiences the dizziness at rest, and the symptoms improve when she is moving around. Feels as if the room is spinning.  She was diagnosed with vestibular migraines and went to vestibular rehab, but has felt no relief. She has also experienced eyelid heaviness and blurry vision, and was tested for myasthenia gravis and thyroid disease, both of which were negative. She also had a MRI head and neck which were negative. \par \par #Acid reflux \par -was  seen by GI in April, was prescribed to take esomeprazole 20 for one more month and then stop. However, never received prescription so hasn't been taking any PPI. Experiencing itermittent reflux symptoms. \par #HCM\par -patient had both doses of covid vaccine\par -last pap in 2020, WNL. \par -PHQ-2=0 \par

## 2021-06-01 NOTE — PHYSICAL EXAM
[No Rash] : no rash [Normal] : affect was normal and insight and judgment were intact [de-identified] : CN 2-12 intact, negative romberg

## 2021-06-01 NOTE — ASSESSMENT
[FreeTextEntry1] : 35 year old female, PMH headaches and vertigo since covid in march 2020, presenting for CPE. \par \par #HCM\par -up to date on pap, due next in 2022\par -FU CBC, CMP, A1C, Lipids \par \par #Gerd\par -esomeprazole 20mg prescribed for one month, patient will stop PPI after one month \par -counseled on dietary changes \par \par #Headaches/vertigo\par -continue with nortriptyline, magnesium, headache symptoms improving \par -continue with vestibular rehab \par -counseled to remain well hydrated in hot weather\par -FU w neurology

## 2021-06-07 DIAGNOSIS — Z00.00 ENCOUNTER FOR GENERAL ADULT MEDICAL EXAMINATION WITHOUT ABNORMAL FINDINGS: ICD-10-CM

## 2021-06-17 NOTE — ED ADULT NURSE NOTE - DOES PATIENT HAVE ADVANCE DIRECTIVE
Birth Registry interview complete via phone call. Birth Certification Confirmation sheet to be signed by RN and paperwork to be collected by RN any time prior to discharge.  
unk

## 2021-07-08 ENCOUNTER — APPOINTMENT (OUTPATIENT)
Dept: OPHTHALMOLOGY | Facility: CLINIC | Age: 36
End: 2021-07-08

## 2021-08-04 NOTE — DISCHARGE NOTE ANTEPARTUM - MEDICATION SUMMARY - MEDICATIONS TO TAKE
I will START or STAY ON the medications listed below when I get home from the hospital:  None Secondary Defect Length In Cm (Required For Flaps): 6

## 2021-11-22 ENCOUNTER — NON-APPOINTMENT (OUTPATIENT)
Age: 36
End: 2021-11-22

## 2021-11-27 NOTE — ED ADULT NURSE NOTE - PRIMARY CARE PROVIDER
Pt sitting on bed in room, has been compliant. Took po meds, tolerated well. Pt stated she has cramps due to menses starting. Pt stated she had headache prior to receiving medication but now it has subsided. Rechecked pt's b/p due to it being elevated prior to HTN medication given, about 1hour later b/p was still elevated. MD mckeon notified, and no new orders given at this time. Pt is asymptomatic to elevated B/P. Will continue to monitor. unk

## 2022-01-11 ENCOUNTER — OUTPATIENT (OUTPATIENT)
Dept: OUTPATIENT SERVICES | Facility: HOSPITAL | Age: 37
LOS: 1 days | End: 2022-01-11
Payer: SELF-PAY

## 2022-01-11 ENCOUNTER — APPOINTMENT (OUTPATIENT)
Dept: NEUROLOGY | Facility: HOSPITAL | Age: 37
End: 2022-01-11

## 2022-01-11 VITALS
HEART RATE: 75 BPM | TEMPERATURE: 96.8 F | DIASTOLIC BLOOD PRESSURE: 74 MMHG | BODY MASS INDEX: 32.57 KG/M2 | WEIGHT: 177 LBS | SYSTOLIC BLOOD PRESSURE: 105 MMHG | HEIGHT: 62 IN

## 2022-01-11 DIAGNOSIS — R51.9 HEADACHE, UNSPECIFIED: ICD-10-CM

## 2022-01-11 PROCEDURE — G0463: CPT

## 2022-01-11 RX ORDER — OMEGA-3/DHA/EPA/FISH OIL 300-1000MG
400 CAPSULE ORAL
Qty: 30 | Refills: 6 | Status: DISCONTINUED | COMMUNITY
Start: 2020-08-04 | End: 2022-01-11

## 2022-01-11 RX ORDER — ELECTROLYTES/DEXTROSE
SOLUTION, ORAL ORAL
Refills: 0 | Status: DISCONTINUED | COMMUNITY
End: 2022-01-11

## 2022-01-11 RX ORDER — ESOMEPRAZOLE MAGNESIUM 20 MG/1
20 CAPSULE, DELAYED RELEASE ORAL DAILY
Qty: 30 | Refills: 0 | Status: DISCONTINUED | COMMUNITY
Start: 2020-10-19 | End: 2022-01-11

## 2022-01-11 RX ORDER — FAMOTIDINE 40 MG/1
40 TABLET, FILM COATED ORAL
Qty: 60 | Refills: 1 | Status: DISCONTINUED | COMMUNITY
Start: 2020-10-19 | End: 2022-01-11

## 2022-01-18 DIAGNOSIS — G43.909 MIGRAINE, UNSPECIFIED, NOT INTRACTABLE, WITHOUT STATUS MIGRAINOSUS: ICD-10-CM

## 2022-01-18 NOTE — PHYSICAL EXAM
[General Appearance - Alert] : alert [General Appearance - In No Acute Distress] : in no acute distress [Oriented To Time, Place, And Person] : oriented to person, place, and time [Impaired Insight] : insight and judgment were intact [Affect] : the affect was normal [Person] : oriented to person [Place] : oriented to place [Time] : oriented to time [Fluency] : fluency intact [Cranial Nerves Optic (II)] : visual acuity intact bilaterally,  visual fields full to confrontation, pupils equal round and reactive to light [Cranial Nerves Oculomotor (III)] : extraocular motion intact [Cranial Nerves Trigeminal (V)] : facial sensation intact symmetrically [Cranial Nerves Facial (VII)] : face symmetrical [Cranial Nerves Vestibulocochlear (VIII)] : hearing was intact bilaterally [Cranial Nerves Glossopharyngeal (IX)] : tongue and palate midline [Cranial Nerves Accessory (XI - Cranial And Spinal)] : head turning and shoulder shrug symmetric [Cranial Nerves Hypoglossal (XII)] : there was no tongue deviation with protrusion [Motor Tone] : muscle tone was normal in all four extremities [Motor Strength] : muscle strength was normal in all four extremities [No Muscle Atrophy] : normal bulk in all four extremities [Sensation Tactile Decrease] : light touch was intact [Sensation Pain / Temperature Decrease] : pain and temperature was intact [Balance] : balance was intact [2+] : Ankle jerk left 2+ [Sclera] : the sclera and conjunctiva were normal [PERRL With Normal Accommodation] : pupils were equal in size, round, reactive to light, with normal accommodation [Extraocular Movements] : extraocular movements were intact [Outer Ear] : the ears and nose were normal in appearance [Oropharynx] : the oropharynx was normal [Neck Appearance] : the appearance of the neck was normal [Neck Cervical Mass (___cm)] : no neck mass was observed [] : no respiratory distress [Abnormal Walk] : normal gait [Skin Color & Pigmentation] : normal skin color and pigmentation [FreeTextEntry1] : \par  [Paresis Pronator Drift Right-Sided] : no pronator drift on the right [Paresis Pronator Drift Left-Sided] : no pronator drift on the left [Motor Strength Upper Extremities Bilaterally] : strength was normal in both upper extremities [Motor Strength Lower Extremities Bilaterally] : strength was normal in both lower extremities [Romberg's Sign] : Romberg's sign was negtive [Tremor] : no tremor present [Plantar Reflex Right Only] : normal on the right [Plantar Reflex Left Only] : normal on the left

## 2022-01-18 NOTE — DATA REVIEWED
[de-identified] : Bloodwork 5/21 all wnl: TSH 2.25, Acetylcholine Blocking Ab neg (17), ACRM modulating Ab neg (<0.03)

## 2022-01-18 NOTE — ASSESSMENT
[FreeTextEntry1] : 35yo woman w/ h/o covid19 3/2020 with resulting dysequilibrium and vestibular migraine presenting as follow up to Neurology Clinic for dysequilibrium, vision changes, headaches. [place]  Sami [id number] used for interpretation. At this time patient patient reports improvement in her symptoms, reporting that her headache frequency has decreased to 1-2x/mo, and dizziness has essentially resolved. This improvement has been in absence of nortriptyline, as pt reports she ran out and did not refill for several mos. Is still taking Mg, which she reports she thinks has helped.\par \par Plan: \par [] Continue Magnesium 400mg qd\par [] keep HA diary\par [] vestibular rehab referral previously sent - pt reports she does not have interest in pursuing this at this time, given symptom improvement, but will consider in future if symptoms return \par [] RTC in 6 months\par \par \par

## 2022-01-18 NOTE — HISTORY OF PRESENT ILLNESS
[FreeTextEntry1] : 1/11/22: \par Pt reports improvement in symptoms since her last visit. She reports that her headaches have decreased in frequency to once or twice per month, and are not as severe as they had been in the past. She also reports her dizziness has improved - she previously described feeling dizziness as lightheadedness, upon standing for prolonged periods, but that would resolve with sitting. She reports that her headaches and dizziness were interfering with ability to go to work, but for the last ~4 months, she has been able to work without difficulty due to symptoms. This improvement has been in the absence of nortriptyline, as she reports she ran out of this medication and never got refill several months ago.  \par At last visit, given c/o eyelid drooping and SOB, bloodwork for MG sent, which returned negative. Pt is reassured to hear this, but reports that she has not noted SOB or lid dropping in recent weeks. \par \par \par  5/4/2021\par 34yo woman w/ h/o covid19 3/2020 with resulting dysequilibrium and vestibular migraine presenting as follow up to Neurology Clinic for dysequilibrium, vision changes, headaches. Echograph  Israeli [id number] used for interpretation. She reports that since the last visit, her headaches have improved and ran out of nortriptyline but has head well-controlled headaches. She reports that she has dry eyes and when she last saw her eye doctor she was prescribed eye drops and was told her vision is improving. She reports that intermittently especially after 3p she feels she feels her eyelids becoming heavy and painful and associated loss of vision which is transient. This has been worked up in the past with neuroimaging and has been negative. She denies recent fevers, chills, nausea, vomiting, dysuria, diarrhea, URI symptoms, recent head or neck trauma, loss of consciousness. She reports she has had intermittent shortness of breath that she notices when she sings. \par \par 11/2020\par Patient presents for follow up visit of he headache and dizziness. Since last visit she reports significant improvement in vision and headaches on her medication regimen. However the patient reports persistence of dizziness. She states that VRT helped and showed improvement but the dizziness has not gone away and continues to bother her. She denies a room spinning sensation. She describes he dizziness as a feeling that her head is moving even when it is still. Patient had ENT appointments x2 since prior visit. She was seen by neurotology Dr. Lehman who noted that otalgia likely referred from her headache. Patient also had scope showing LPR and started on GI prophylaxis. Patient denies fever, chills, sob, cp. \par \par HPI\par Patient is a 35 year old woman with PMHx of Ecoli sepsis + intubation in 12/2019 from pyelonephritis, self-reported covid+ve in 3/2020 (mild sx), who had presented to Neurology clinic originally on 8/4/20 for vague bitemporal HA, paroxysmal vertigo (internal swaying) and blurry/darkening vision since March. Pt has had multiple ER visits for similar complaints, had MRI/MRA brain/neck at [facility] which were negative, was also given sumatriptan 50 by PCP which she tried few times (unclear if used appropriately at onset) but discontinued seeing no benefit. She also reports feeling overwhelmed as she has been unable to work due to her vertigo and vision issues, causing marital and economical stress with a 2 year old child at home. She also was seen for panic attack and passive SI in ER, which improved and was cleared by psych for asked for outpt f/u which she has deferred for now. She was seen by ENT and their workup revealed evidence of central vestibulopathy but negative for peripheral causes of vertigo.\par Denies toxic habits, photo/phonophobia, focal numbness, weakness, LOC, bowel bladder issues. \par She denies family hx of migraines, aneurysm or strokes. Has used tylenol for OTC headache analgesia only

## 2022-01-18 NOTE — REASON FOR VISIT
[Follow-Up: _____] : a [unfilled] follow-up visit [[employer] ] : provided by [employer]   [FreeTextEntry1] : [id number] [id number] [Interpreters_IDNumber] : [id number] [TWNoteComboBox1] : Ecuadorean

## 2022-03-28 ENCOUNTER — OUTPATIENT (OUTPATIENT)
Dept: OUTPATIENT SERVICES | Facility: HOSPITAL | Age: 37
LOS: 1 days | End: 2022-03-28
Payer: SELF-PAY

## 2022-03-28 ENCOUNTER — RESULT REVIEW (OUTPATIENT)
Age: 37
End: 2022-03-28

## 2022-03-28 ENCOUNTER — APPOINTMENT (OUTPATIENT)
Dept: INTERNAL MEDICINE | Facility: CLINIC | Age: 37
End: 2022-03-28
Payer: COMMERCIAL

## 2022-03-28 VITALS
BODY MASS INDEX: 33.13 KG/M2 | SYSTOLIC BLOOD PRESSURE: 118 MMHG | DIASTOLIC BLOOD PRESSURE: 78 MMHG | HEIGHT: 62 IN | HEART RATE: 72 BPM | OXYGEN SATURATION: 97 % | WEIGHT: 180 LBS

## 2022-03-28 DIAGNOSIS — I10 ESSENTIAL (PRIMARY) HYPERTENSION: ICD-10-CM

## 2022-03-28 DIAGNOSIS — R10.12 LEFT UPPER QUADRANT PAIN: ICD-10-CM

## 2022-03-28 PROCEDURE — G0463: CPT

## 2022-03-28 PROCEDURE — ZZZZZ: CPT

## 2022-03-30 LAB
HCG UR QL: NEGATIVE
QUALITY CONTROL: NORMAL

## 2022-04-02 ENCOUNTER — OUTPATIENT (OUTPATIENT)
Dept: OUTPATIENT SERVICES | Facility: HOSPITAL | Age: 37
LOS: 1 days | End: 2022-04-02
Payer: SELF-PAY

## 2022-04-02 ENCOUNTER — APPOINTMENT (OUTPATIENT)
Dept: ULTRASOUND IMAGING | Facility: IMAGING CENTER | Age: 37
End: 2022-04-02
Payer: COMMERCIAL

## 2022-04-02 DIAGNOSIS — I10 ESSENTIAL (PRIMARY) HYPERTENSION: ICD-10-CM

## 2022-04-02 DIAGNOSIS — R10.12 LEFT UPPER QUADRANT PAIN: ICD-10-CM

## 2022-04-02 PROCEDURE — 76700 US EXAM ABDOM COMPLETE: CPT | Mod: 26

## 2022-04-02 PROCEDURE — 76700 US EXAM ABDOM COMPLETE: CPT

## 2022-04-03 NOTE — REVIEW OF SYSTEMS
[Abdominal Pain] : abdominal pain [Heartburn] : heartburn [Fever] : no fever [Chest Pain] : no chest pain [Lower Ext Edema] : no lower extremity edema [Orthopnea] : no orthopnea [Shortness Of Breath] : no shortness of breath [Wheezing] : no wheezing [Cough] : no cough [Dyspnea on Exertion] : no dyspnea on exertion [Nausea] : no nausea [Constipation] : no constipation [Diarrhea] : diarrhea [Vomiting] : no vomiting [Melena] : no melena [Dysuria] : no dysuria [Incontinence] : no incontinence [Hematuria] : no hematuria

## 2022-04-03 NOTE — ASSESSMENT
[FreeTextEntry1] : Ms. Brie Key is a 35 year old female with GERD who is presenting for abdominal discomfort.\par \par Pt discussed with Dr. Rogers.\par F/u in 5 weeks.\par Vijay Mancuso, PGY1

## 2022-04-03 NOTE — PHYSICAL EXAM
[Soft] : abdomen soft [Non-distended] : non-distended [No Masses] : no abdominal mass palpated [No HSM] : no HSM [Normal Bowel Sounds] : normal bowel sounds [Normal] : normal gait, coordination grossly intact, no focal deficits and deep tendon reflexes were 2+ and symmetric [de-identified] : LUQ very mildly tender on deep palpation

## 2022-04-03 NOTE — END OF VISIT
[] : Resident [FreeTextEntry3] : intermittent abd pain x 4 years - in LUQ. unclear etiology. slight localized tenderness on exam. US for further eval. next steps based on results. call immed for any worsening . f/u 5 wks

## 2022-04-03 NOTE — HISTORY OF PRESENT ILLNESS
The patient is a 58y Male complaining of [[employer] ] : provided by [employer]   [FreeTextEntry1] : abdominal discomfort [Interpreters_IDNumber] : [id number] [Interpreters_FullName] : Siria [de-identified] : Ms. Brie Key is a 35 year old female with GERD who is presenting for abdominal discomfort.\par \par #Abdominal Pain\par Reports abdominal pain on the left side that started approximately 2018 since her last pregnancy. States at the time she got a scan of her stomach which showed small "lumps there". Reports since then the pain comes and goes. States the pain worsens when she is standing for long periods of time. Reports previously she was also told she had a cyst but when she went to get imaging there was no cyst. Denies currently having pain. Reports the pain is unrelated to eating. Denies any N/V. Describes the pain as a stabbing pain, and feels like she has a lump in her stomach. Denies any constipation, diarrhea, or hematochezia. Reports when she was taking medication for heart burn but reports not helpful for abdominal pain. Does report she has some mild dysuria approx 1 month ago but has since resolved.\par Saw GI, and EGD (2020) normal. \par Denies any fever, difficulty breathing, cough, or CP. \par \par Reports she is occasionally sexually active and last period was last month on the 24th.\par

## 2022-04-04 ENCOUNTER — NON-APPOINTMENT (OUTPATIENT)
Age: 37
End: 2022-04-04

## 2022-04-11 DIAGNOSIS — R10.12 LEFT UPPER QUADRANT PAIN: ICD-10-CM

## 2022-04-11 PROBLEM — E07.9 THYROID LUMP: Status: ACTIVE | Noted: 2019-10-01

## 2022-04-11 PROBLEM — Z11.59 SCREENING FOR VIRAL DISEASE: Status: RESOLVED | Noted: 2020-06-09 | Resolved: 2021-04-22

## 2022-04-25 ENCOUNTER — APPOINTMENT (OUTPATIENT)
Dept: INTERNAL MEDICINE | Facility: CLINIC | Age: 37
End: 2022-04-25
Payer: COMMERCIAL

## 2022-04-25 ENCOUNTER — OUTPATIENT (OUTPATIENT)
Dept: OUTPATIENT SERVICES | Facility: HOSPITAL | Age: 37
LOS: 1 days | End: 2022-04-25
Payer: SELF-PAY

## 2022-04-25 ENCOUNTER — LABORATORY RESULT (OUTPATIENT)
Age: 37
End: 2022-04-25

## 2022-04-25 VITALS
SYSTOLIC BLOOD PRESSURE: 112 MMHG | HEIGHT: 62 IN | BODY MASS INDEX: 33.13 KG/M2 | DIASTOLIC BLOOD PRESSURE: 78 MMHG | OXYGEN SATURATION: 97 % | HEART RATE: 84 BPM | WEIGHT: 180 LBS

## 2022-04-25 DIAGNOSIS — I10 ESSENTIAL (PRIMARY) HYPERTENSION: ICD-10-CM

## 2022-04-25 DIAGNOSIS — R30.0 DYSURIA: ICD-10-CM

## 2022-04-25 LAB
BILIRUB UR QL STRIP: NORMAL
CLARITY UR: CLEAR
COLLECTION METHOD: NORMAL
GLUCOSE UR-MCNC: NORMAL
HCG UR QL: 0.2 EU/DL
HGB UR QL STRIP.AUTO: NORMAL
KETONES UR-MCNC: NORMAL
LEUKOCYTE ESTERASE UR QL STRIP: NORMAL
NITRITE UR QL STRIP: NORMAL
PH UR STRIP: 7.5
PROT UR STRIP-MCNC: NORMAL
SP GR UR STRIP: 1.02

## 2022-04-25 PROCEDURE — ZZZZZ: CPT

## 2022-04-25 RX ORDER — MAGNESIUM OXIDE 241.3 MG/1000MG
400 TABLET ORAL DAILY
Qty: 30 | Refills: 2 | Status: COMPLETED | COMMUNITY
Start: 2022-01-11 | End: 2022-04-25

## 2022-04-25 RX ORDER — SIMETHICONE 125 MG
125 CAPSULE ORAL
Qty: 1 | Refills: 2 | Status: ACTIVE | COMMUNITY
Start: 2022-04-25 | End: 1900-01-01

## 2022-04-25 NOTE — ASSESSMENT
[FreeTextEntry1] : 36 year old history of GERD (no longer on medication) presenting for a follow up for abdominal pain. \par \par #Abdominal pain \par - patient with LUQ abdominal pain relieved by bowel movements/passing gas, eating fiber and walking \par - pain likely secondary to IBS and gas \par - sent simethicone to pharmacy \par - encouraged patient to continue eating a high fiber diet and remain physically active \par - H. pylori sent \par \par #Dysuria \par - POC UA negative \par - urine culture sent \par \par #HCM \par - CBC, CMP, lipid panel, A1C sent \par \par Follow up for abdominal pain as needed\par \par Ling Diana MD \par PGY1 Internal Medicine \par \par d/w Dr. Chapman

## 2022-04-25 NOTE — HISTORY OF PRESENT ILLNESS
[[employer] ] : provided by [employer]   [Time Spent: ____ minutes] : Total time spent using  services: [unfilled] minutes. The patient's primary language is not English thus required  services. [FreeTextEntry1] : Abdominal Pain  [Interpreters_IDNumber] : [id number] [TWNoteComboBox1] : Cambodian [de-identified] : 36 year old history of GERD (no longer on medication) presenting for a follow up for abdominal pain. Patient recently had an abdominal ultrasound which was negative with the exception of mild hepatic steatosis. Patient states that since her last visit she has been walking and eating more fruits and vegetables and her abdominal pain has improved somewhat but she still experiences mild abdominal pain daily. She reports that after eating she feels "bubbles" in her stomach, and passing gas/having bowel movements helps relieve the pain. Tylenol does not help with the pain. \par \par Patient also reports that she has been experiencing dysuria for the past two days and would like to be tested for a UTI. Also would like to get her routine lab work done today.

## 2022-04-25 NOTE — REVIEW OF SYSTEMS
[Abdominal Pain] : abdominal pain [Dysuria] : dysuria [Fever] : no fever [Chills] : no chills [Fatigue] : no fatigue [Chest Pain] : no chest pain [Lower Ext Edema] : no lower extremity edema [Shortness Of Breath] : no shortness of breath [Nausea] : no nausea [Constipation] : no constipation [Diarrhea] : diarrhea [Vomiting] : no vomiting [Heartburn] : no heartburn [Anxiety] : no anxiety [Depression] : no depression

## 2022-04-25 NOTE — HEALTH RISK ASSESSMENT
[Never] : Never [No] : In the past 12 months have you used drugs other than those required for medical reasons? No [No falls in past year] : Patient reported no falls in the past year [0] : 2) Feeling down, depressed, or hopeless: Not at all (0) [PHQ-2 Negative - No further assessment needed] : PHQ-2 Negative - No further assessment needed [[id number]] : 0

## 2022-04-25 NOTE — PHYSICAL EXAM
[Soft] : abdomen soft [Non-distended] : non-distended [Normal] : affect was normal and insight and judgment were intact [de-identified] : tenderness to palpation in LUQ

## 2022-04-26 LAB
ALBUMIN SERPL ELPH-MCNC: 4.6 G/DL
ALP BLD-CCNC: 71 U/L
ALT SERPL-CCNC: 42 U/L
ANION GAP SERPL CALC-SCNC: 13 MMOL/L
APPEARANCE: CLEAR
AST SERPL-CCNC: 27 U/L
BASOPHILS # BLD AUTO: 0.04 K/UL
BASOPHILS NFR BLD AUTO: 0.7 %
BILIRUB SERPL-MCNC: 0.5 MG/DL
BILIRUBIN URINE: NEGATIVE
BLOOD URINE: NEGATIVE
BUN SERPL-MCNC: 9 MG/DL
CALCIUM SERPL-MCNC: 9.5 MG/DL
CHLORIDE SERPL-SCNC: 102 MMOL/L
CHOLEST SERPL-MCNC: 208 MG/DL
CO2 SERPL-SCNC: 23 MMOL/L
COLOR: NORMAL
CREAT SERPL-MCNC: 0.58 MG/DL
EGFR: 122 ML/MIN/1.73M2
EOSINOPHIL # BLD AUTO: 0.07 K/UL
EOSINOPHIL NFR BLD AUTO: 1.2 %
ESTIMATED AVERAGE GLUCOSE: 108 MG/DL
GLUCOSE QUALITATIVE U: NEGATIVE
GLUCOSE SERPL-MCNC: 95 MG/DL
HBA1C MFR BLD HPLC: 5.4 %
HCT VFR BLD CALC: 44.1 %
HDLC SERPL-MCNC: 67 MG/DL
HGB BLD-MCNC: 14.6 G/DL
IMM GRANULOCYTES NFR BLD AUTO: 0.3 %
KETONES URINE: NEGATIVE
LDLC SERPL CALC-MCNC: 113 MG/DL
LEUKOCYTE ESTERASE URINE: ABNORMAL
LYMPHOCYTES # BLD AUTO: 2.04 K/UL
LYMPHOCYTES NFR BLD AUTO: 34.1 %
MAN DIFF?: NORMAL
MCHC RBC-ENTMCNC: 32.2 PG
MCHC RBC-ENTMCNC: 33.1 GM/DL
MCV RBC AUTO: 97.4 FL
MONOCYTES # BLD AUTO: 0.48 K/UL
MONOCYTES NFR BLD AUTO: 8 %
NEUTROPHILS # BLD AUTO: 3.33 K/UL
NEUTROPHILS NFR BLD AUTO: 55.7 %
NITRITE URINE: NEGATIVE
NONHDLC SERPL-MCNC: 141 MG/DL
PH URINE: 8
PLATELET # BLD AUTO: 186 K/UL
POTASSIUM SERPL-SCNC: 4.1 MMOL/L
PROT SERPL-MCNC: 7.2 G/DL
PROTEIN URINE: NEGATIVE
RBC # BLD: 4.53 M/UL
RBC # FLD: 11.9 %
SODIUM SERPL-SCNC: 138 MMOL/L
SPECIFIC GRAVITY URINE: 1.02
TRIGL SERPL-MCNC: 141 MG/DL
UROBILINOGEN URINE: NORMAL
WBC # FLD AUTO: 5.98 K/UL

## 2022-04-27 DIAGNOSIS — R10.9 UNSPECIFIED ABDOMINAL PAIN: ICD-10-CM

## 2022-04-27 DIAGNOSIS — R30.0 DYSURIA: ICD-10-CM

## 2022-05-23 ENCOUNTER — LABORATORY RESULT (OUTPATIENT)
Age: 37
End: 2022-05-23

## 2022-05-23 PROCEDURE — 87086 URINE CULTURE/COLONY COUNT: CPT

## 2022-05-23 PROCEDURE — 81003 URINALYSIS AUTO W/O SCOPE: CPT

## 2022-05-23 PROCEDURE — 83036 HEMOGLOBIN GLYCOSYLATED A1C: CPT

## 2022-05-23 PROCEDURE — 80061 LIPID PANEL: CPT

## 2022-05-23 PROCEDURE — 80053 COMPREHEN METABOLIC PANEL: CPT

## 2022-05-23 PROCEDURE — 85025 COMPLETE CBC W/AUTO DIFF WBC: CPT

## 2022-05-23 PROCEDURE — 81001 URINALYSIS AUTO W/SCOPE: CPT

## 2022-05-23 PROCEDURE — G0463: CPT | Mod: 25

## 2022-05-23 PROCEDURE — T1013: CPT

## 2022-05-23 PROCEDURE — 87338 HPYLORI STOOL AG IA: CPT

## 2022-06-01 ENCOUNTER — APPOINTMENT (OUTPATIENT)
Dept: INTERNAL MEDICINE | Facility: CLINIC | Age: 37
End: 2022-06-01
Payer: COMMERCIAL

## 2022-06-01 ENCOUNTER — OUTPATIENT (OUTPATIENT)
Dept: OUTPATIENT SERVICES | Facility: HOSPITAL | Age: 37
LOS: 1 days | End: 2022-06-01
Payer: SELF-PAY

## 2022-06-01 VITALS
WEIGHT: 181 LBS | SYSTOLIC BLOOD PRESSURE: 124 MMHG | OXYGEN SATURATION: 99 % | HEART RATE: 88 BPM | HEIGHT: 62 IN | BODY MASS INDEX: 33.31 KG/M2 | DIASTOLIC BLOOD PRESSURE: 74 MMHG

## 2022-06-01 DIAGNOSIS — Z00.00 ENCOUNTER FOR GENERAL ADULT MEDICAL EXAMINATION W/OUT ABNORMAL FINDINGS: ICD-10-CM

## 2022-06-01 DIAGNOSIS — R10.9 UNSPECIFIED ABDOMINAL PAIN: ICD-10-CM

## 2022-06-01 DIAGNOSIS — I10 ESSENTIAL (PRIMARY) HYPERTENSION: ICD-10-CM

## 2022-06-01 PROCEDURE — T1013: CPT

## 2022-06-01 PROCEDURE — ZZZZZ: CPT | Mod: GE

## 2022-06-01 PROCEDURE — G0463: CPT | Mod: 25

## 2022-06-01 RX ORDER — LORATADINE 10 MG
17 TABLET,DISINTEGRATING ORAL
Qty: 3 | Refills: 2 | Status: ACTIVE | COMMUNITY
Start: 2022-06-01 | End: 1900-01-01

## 2022-06-03 NOTE — REVIEW OF SYSTEMS
[Abdominal Pain] : abdominal pain [Constipation] : constipation [Depression] : depression [Fever] : no fever [Chills] : no chills [Chest Pain] : no chest pain [Shortness Of Breath] : no shortness of breath [Nausea] : no nausea [Diarrhea] : diarrhea [Vomiting] : no vomiting [Dysuria] : no dysuria [Anxiety] : no anxiety

## 2022-06-03 NOTE — PHYSICAL EXAM
[PERRL] : pupils equal round and reactive to light [Soft] : abdomen soft [Non-distended] : non-distended [Normal] : affect was normal and insight and judgment were intact [de-identified] : tenderness to palpation in LLQ and LUQ

## 2022-06-03 NOTE — HISTORY OF PRESENT ILLNESS
[[employer] ] : provided by [employer]   [Time Spent: ____ minutes] : Total time spent using  services: [unfilled] minutes. The patient's primary language is not English thus required  services. [FreeTextEntry1] : Follow up for Abdominal Pain  [Interpreters_IDNumber] : [id number] [TWNoteComboBox1] : Micronesian [de-identified] : 36 year old history of GERD (not on medication), migraines, depression, pregnancy loss at 23 weeks in 2015 here for a follow up for abdominal pain. \par \par Patient states that overall the pain on the left side of her abdomen is improved from prior. She states that the simethicone helped, along with eating more fiber and increasing her physical activity. However, over the last few days she has felt more constipated than prior and the abdominal pain has become more severe. The pain is not associated with eating. She denies diarrhea, blood in her stool, vomiting, fevers.  \par \par Patient also states that her migraines have become more frequent and severe recently. She has migraines twice per month that last four days at a time. She follows with neurology and was previously on nortriptyline daily which helped but discontinued this medication when her migraines improved.  \par \par Patient also states that after her pregnancy loss in 2015 she was severely depressed and had suicidal ideations. She now occasionally has depressed mood but no longer feels suicidal, and states that her four year old son is a protective factor. She would not like to be treated with medications or therapy at this time. \par \par She would also like a dental referral for a chipped tooth.

## 2022-06-03 NOTE — ASSESSMENT
[FreeTextEntry1] : 36 year old history of GERD (not on medication), migraines, depression, pregnancy loss at 23 weeks in 2015 here for a follow up for abdominal pain. \par \par #Abdominal pain \par - Patient with abdominal pain  improved by simethicone, fiber and activity, worsened when constipated \par - will prescribe patient miralax 17g daily to take as needed for constipation \par - no alarm symptoms \par \par #Migraines \par - patient with a history of migraines, previously on nortriptyline but discontinued when migraines improved \par - migraines have recently increased in frequency and severity \par - will restart nortriptyline 10mg qhs \par \par #Depression \par - patient with a history of depression, improved from prior, no SI \par - would not like therapy or medication management at this time \par \par #HCM \par -- patient requesting dental referral for chipped tooth \par \par Ling Diana MD \par PGY1, Internal Medicine \par \par d/w Dr. Chapman

## 2022-06-14 DIAGNOSIS — R10.9 UNSPECIFIED ABDOMINAL PAIN: ICD-10-CM

## 2022-06-14 DIAGNOSIS — G43.809 OTHER MIGRAINE, NOT INTRACTABLE, WITHOUT STATUS MIGRAINOSUS: ICD-10-CM

## 2022-06-20 ENCOUNTER — APPOINTMENT (OUTPATIENT)
Dept: INTERNAL MEDICINE | Facility: CLINIC | Age: 37
End: 2022-06-20

## 2022-09-08 NOTE — ED PROVIDER NOTE - CONSTITUTIONAL DISTRESS
MILD Finasteride Counseling:  I discussed with the patient the risks of use of finasteride including but not limited to decreased libido, decreased ejaculate volume, gynecomastia, and depression. Women should not handle medication.  All of the patient's questions and concerns were addressed. Finasteride Male Counseling: Finasteride Counseling:  I discussed with the patient the risks of use of finasteride including but not limited to decreased libido, decreased ejaculate volume, gynecomastia, and depression. Women should not handle medication.  All of the patient's questions and concerns were addressed.

## 2022-12-22 ENCOUNTER — OUTPATIENT (OUTPATIENT)
Dept: OUTPATIENT SERVICES | Facility: HOSPITAL | Age: 37
LOS: 1 days | End: 2022-12-22
Payer: SELF-PAY

## 2022-12-22 ENCOUNTER — APPOINTMENT (OUTPATIENT)
Dept: INTERNAL MEDICINE | Facility: CLINIC | Age: 37
End: 2022-12-22

## 2022-12-22 VITALS
BODY MASS INDEX: 33.31 KG/M2 | OXYGEN SATURATION: 98 % | WEIGHT: 181 LBS | DIASTOLIC BLOOD PRESSURE: 78 MMHG | HEART RATE: 95 BPM | HEIGHT: 62 IN | SYSTOLIC BLOOD PRESSURE: 122 MMHG

## 2022-12-22 DIAGNOSIS — L73.9 FOLLICULAR DISORDER, UNSPECIFIED: ICD-10-CM

## 2022-12-22 DIAGNOSIS — I10 ESSENTIAL (PRIMARY) HYPERTENSION: ICD-10-CM

## 2022-12-22 PROCEDURE — ZZZZZ: CPT

## 2022-12-22 PROCEDURE — G0463: CPT | Mod: 25

## 2022-12-22 PROCEDURE — T1013: CPT

## 2022-12-22 RX ORDER — MUPIROCIN 20 MG/G
2 OINTMENT TOPICAL 3 TIMES DAILY
Qty: 1 | Refills: 0 | Status: ACTIVE | COMMUNITY
Start: 2022-12-22 | End: 1900-01-01

## 2022-12-22 NOTE — PHYSICAL EXAM
[No Acute Distress] : no acute distress [Well Nourished] : well nourished [Well Developed] : well developed [Well-Appearing] : well-appearing [No JVD] : no jugular venous distention [No Respiratory Distress] : no respiratory distress  [No Accessory Muscle Use] : no accessory muscle use [Clear to Auscultation] : lungs were clear to auscultation bilaterally [Normal Rate] : normal rate  [Regular Rhythm] : with a regular rhythm [Normal S1, S2] : normal S1 and S2 [No Edema] : there was no peripheral edema [Soft] : abdomen soft [No HSM] : no HSM [de-identified] : R groin has a 2x2cm elevated papule with healing center, no fluctuance noted and no skin erythema  [de-identified] : R labia revealed 1cm skin breakdown with small amount of blood

## 2022-12-22 NOTE — END OF VISIT
[] : Resident [FreeTextEntry3] : 38yo F with PMhx of migraines who presents for a pimple in her groin and labial irritation. Reports the pimp burst already. Still having some mild pain. On exam, 1cm nodule without purulence, fluctuance, or erythema. Labial without rash or discharge, however do note mild erythema and area of skin breakdown in the labial creases. Furuncle/folliculitis overall appears to be healing well. Agree with local wound care for now. Labial irritation seems to be irritant dermatitis, wound care recommended.

## 2022-12-22 NOTE — ASSESSMENT
[FreeTextEntry1] : 37F w/PMHx of GERD, migraines, depression p/w pimple located above groin area likely 2/2 folliculitis.\par \par RPA for next CPE unless any acute changes\par \par d/w Dr. Guardado

## 2022-12-22 NOTE — INTERPRETER SERVICES
[[employer] ] : provided by [employer]   [Time Spent: ____ minutes] : Total time spent using  services: [unfilled] minutes. The patient's primary language is not English thus required  services. [Interpreters_IDNumber] : [id number] [Interpreters_FullName] : [place]

## 2022-12-22 NOTE — HISTORY OF PRESENT ILLNESS
[FreeTextEntry1] : RPA [de-identified] : 37F w/PMHx of GERD, migraines, depression p/w RPA\par \par Called yesterday she has a welt/pimple near her vaginal area. The welt/pimple kept increasing until it "exploded per patient after she put toothpaste on it". At this time the patient still feels that she has something in that area. She noticed the welt/pimple on 12/14.In additionally, per patient vaginal skin has irritation that comes and goes. Other ROS neg.\par Currently not sexually active but uses condoms consistently. Has not tested positive for HIV/Herpes/Chlamdyia/Gonorrhea\par LMP 12/14 and lasted for 4 days and was normal, regularly. \par Last pap smear was on 2 years. \par \par

## 2022-12-27 DIAGNOSIS — L73.9 FOLLICULAR DISORDER, UNSPECIFIED: ICD-10-CM

## 2023-01-01 NOTE — ED ADULT TRIAGE NOTE - PAIN RATING/NUMBER SCALE (0-10): REST
Mother states pt c/o fever and not taking his bottle like he normally does; pt sleeping in car seat upon arrival    No meds given pta; afebrile in triage
8

## 2023-01-03 ENCOUNTER — EMERGENCY (EMERGENCY)
Facility: HOSPITAL | Age: 38
LOS: 1 days | Discharge: HOME | End: 2023-01-03
Attending: EMERGENCY MEDICINE
Payer: MEDICAID

## 2023-01-03 VITALS
WEIGHT: 181 LBS | TEMPERATURE: 99 F | HEIGHT: 62 IN | HEART RATE: 90 BPM | SYSTOLIC BLOOD PRESSURE: 121 MMHG | DIASTOLIC BLOOD PRESSURE: 79 MMHG | RESPIRATION RATE: 18 BRPM | OXYGEN SATURATION: 99 %

## 2023-01-03 VITALS
HEART RATE: 82 BPM | DIASTOLIC BLOOD PRESSURE: 78 MMHG | OXYGEN SATURATION: 99 % | RESPIRATION RATE: 18 BRPM | TEMPERATURE: 98 F | SYSTOLIC BLOOD PRESSURE: 116 MMHG

## 2023-01-03 LAB
ALBUMIN SERPL ELPH-MCNC: 4.7 G/DL — SIGNIFICANT CHANGE UP (ref 3.3–5)
ALP SERPL-CCNC: 81 U/L — SIGNIFICANT CHANGE UP (ref 40–120)
ALT FLD-CCNC: 48 U/L — HIGH (ref 10–45)
ANION GAP SERPL CALC-SCNC: 11 MMOL/L — SIGNIFICANT CHANGE UP (ref 5–17)
APPEARANCE UR: ABNORMAL
APPEARANCE UR: CLEAR — SIGNIFICANT CHANGE UP
AST SERPL-CCNC: 23 U/L — SIGNIFICANT CHANGE UP (ref 10–40)
BACTERIA # UR AUTO: ABNORMAL
BACTERIA # UR AUTO: NEGATIVE — SIGNIFICANT CHANGE UP
BASOPHILS # BLD AUTO: 0.05 K/UL — SIGNIFICANT CHANGE UP (ref 0–0.2)
BASOPHILS NFR BLD AUTO: 0.7 % — SIGNIFICANT CHANGE UP (ref 0–2)
BILIRUB SERPL-MCNC: 0.3 MG/DL — SIGNIFICANT CHANGE UP (ref 0.2–1.2)
BILIRUB UR-MCNC: NEGATIVE — SIGNIFICANT CHANGE UP
BILIRUB UR-MCNC: NEGATIVE — SIGNIFICANT CHANGE UP
BUN SERPL-MCNC: 11 MG/DL — SIGNIFICANT CHANGE UP (ref 7–23)
CALCIUM SERPL-MCNC: 9.8 MG/DL — SIGNIFICANT CHANGE UP (ref 8.4–10.5)
CHLORIDE SERPL-SCNC: 100 MMOL/L — SIGNIFICANT CHANGE UP (ref 96–108)
CO2 SERPL-SCNC: 27 MMOL/L — SIGNIFICANT CHANGE UP (ref 22–31)
COLOR SPEC: SIGNIFICANT CHANGE UP
COLOR SPEC: SIGNIFICANT CHANGE UP
CREAT SERPL-MCNC: 0.61 MG/DL — SIGNIFICANT CHANGE UP (ref 0.5–1.3)
DIFF PNL FLD: NEGATIVE — SIGNIFICANT CHANGE UP
DIFF PNL FLD: NEGATIVE — SIGNIFICANT CHANGE UP
EGFR: 119 ML/MIN/1.73M2 — SIGNIFICANT CHANGE UP
EOSINOPHIL # BLD AUTO: 0.03 K/UL — SIGNIFICANT CHANGE UP (ref 0–0.5)
EOSINOPHIL NFR BLD AUTO: 0.4 % — SIGNIFICANT CHANGE UP (ref 0–6)
EPI CELLS # UR: 12 /HPF — HIGH
EPI CELLS # UR: 6 /HPF — HIGH
GLUCOSE SERPL-MCNC: 86 MG/DL — SIGNIFICANT CHANGE UP (ref 70–99)
GLUCOSE UR QL: NEGATIVE — SIGNIFICANT CHANGE UP
GLUCOSE UR QL: NEGATIVE — SIGNIFICANT CHANGE UP
HCG SERPL-ACNC: <2 MIU/ML — SIGNIFICANT CHANGE UP
HCT VFR BLD CALC: 43.8 % — SIGNIFICANT CHANGE UP (ref 34.5–45)
HGB BLD-MCNC: 14.9 G/DL — SIGNIFICANT CHANGE UP (ref 11.5–15.5)
HYALINE CASTS # UR AUTO: 2 /LPF — SIGNIFICANT CHANGE UP (ref 0–2)
IMM GRANULOCYTES NFR BLD AUTO: 0.4 % — SIGNIFICANT CHANGE UP (ref 0–0.9)
KETONES UR-MCNC: NEGATIVE — SIGNIFICANT CHANGE UP
KETONES UR-MCNC: NEGATIVE — SIGNIFICANT CHANGE UP
LEUKOCYTE ESTERASE UR-ACNC: ABNORMAL
LEUKOCYTE ESTERASE UR-ACNC: ABNORMAL
LYMPHOCYTES # BLD AUTO: 2.35 K/UL — SIGNIFICANT CHANGE UP (ref 1–3.3)
LYMPHOCYTES # BLD AUTO: 31.4 % — SIGNIFICANT CHANGE UP (ref 13–44)
MAGNESIUM SERPL-MCNC: 2.2 MG/DL — SIGNIFICANT CHANGE UP (ref 1.6–2.6)
MCHC RBC-ENTMCNC: 33.1 PG — SIGNIFICANT CHANGE UP (ref 27–34)
MCHC RBC-ENTMCNC: 34 GM/DL — SIGNIFICANT CHANGE UP (ref 32–36)
MCV RBC AUTO: 97.3 FL — SIGNIFICANT CHANGE UP (ref 80–100)
MONOCYTES # BLD AUTO: 0.59 K/UL — SIGNIFICANT CHANGE UP (ref 0–0.9)
MONOCYTES NFR BLD AUTO: 7.9 % — SIGNIFICANT CHANGE UP (ref 2–14)
NEUTROPHILS # BLD AUTO: 4.43 K/UL — SIGNIFICANT CHANGE UP (ref 1.8–7.4)
NEUTROPHILS NFR BLD AUTO: 59.2 % — SIGNIFICANT CHANGE UP (ref 43–77)
NITRITE UR-MCNC: NEGATIVE — SIGNIFICANT CHANGE UP
NITRITE UR-MCNC: NEGATIVE — SIGNIFICANT CHANGE UP
NRBC # BLD: 0 /100 WBCS — SIGNIFICANT CHANGE UP (ref 0–0)
PH UR: 6.5 — SIGNIFICANT CHANGE UP (ref 5–8)
PH UR: 6.5 — SIGNIFICANT CHANGE UP (ref 5–8)
PLATELET # BLD AUTO: 200 K/UL — SIGNIFICANT CHANGE UP (ref 150–400)
POTASSIUM SERPL-MCNC: 3.9 MMOL/L — SIGNIFICANT CHANGE UP (ref 3.5–5.3)
POTASSIUM SERPL-SCNC: 3.9 MMOL/L — SIGNIFICANT CHANGE UP (ref 3.5–5.3)
PROT SERPL-MCNC: 7.7 G/DL — SIGNIFICANT CHANGE UP (ref 6–8.3)
PROT UR-MCNC: NEGATIVE — SIGNIFICANT CHANGE UP
PROT UR-MCNC: NEGATIVE — SIGNIFICANT CHANGE UP
RBC # BLD: 4.5 M/UL — SIGNIFICANT CHANGE UP (ref 3.8–5.2)
RBC # FLD: 11.9 % — SIGNIFICANT CHANGE UP (ref 10.3–14.5)
RBC CASTS # UR COMP ASSIST: 1 /HPF — SIGNIFICANT CHANGE UP (ref 0–4)
RBC CASTS # UR COMP ASSIST: 2 /HPF — SIGNIFICANT CHANGE UP (ref 0–4)
SODIUM SERPL-SCNC: 138 MMOL/L — SIGNIFICANT CHANGE UP (ref 135–145)
SP GR SPEC: 1.01 — LOW (ref 1.01–1.02)
SP GR SPEC: 1.01 — SIGNIFICANT CHANGE UP (ref 1.01–1.02)
UROBILINOGEN FLD QL: NEGATIVE — SIGNIFICANT CHANGE UP
UROBILINOGEN FLD QL: NEGATIVE — SIGNIFICANT CHANGE UP
WBC # BLD: 7.48 K/UL — SIGNIFICANT CHANGE UP (ref 3.8–10.5)
WBC # FLD AUTO: 7.48 K/UL — SIGNIFICANT CHANGE UP (ref 3.8–10.5)
WBC UR QL: 3 /HPF — SIGNIFICANT CHANGE UP (ref 0–5)
WBC UR QL: 31 /HPF — HIGH (ref 0–5)

## 2023-01-03 PROCEDURE — 87086 URINE CULTURE/COLONY COUNT: CPT

## 2023-01-03 PROCEDURE — 84702 CHORIONIC GONADOTROPIN TEST: CPT

## 2023-01-03 PROCEDURE — 70450 CT HEAD/BRAIN W/O DYE: CPT | Mod: 26,MA

## 2023-01-03 PROCEDURE — 99285 EMERGENCY DEPT VISIT HI MDM: CPT

## 2023-01-03 PROCEDURE — 85025 COMPLETE CBC W/AUTO DIFF WBC: CPT

## 2023-01-03 PROCEDURE — 93005 ELECTROCARDIOGRAM TRACING: CPT

## 2023-01-03 PROCEDURE — 83735 ASSAY OF MAGNESIUM: CPT

## 2023-01-03 PROCEDURE — 81001 URINALYSIS AUTO W/SCOPE: CPT

## 2023-01-03 PROCEDURE — 80053 COMPREHEN METABOLIC PANEL: CPT

## 2023-01-03 PROCEDURE — 87491 CHLMYD TRACH DNA AMP PROBE: CPT

## 2023-01-03 PROCEDURE — 87591 N.GONORRHOEAE DNA AMP PROB: CPT

## 2023-01-03 PROCEDURE — 99285 EMERGENCY DEPT VISIT HI MDM: CPT | Mod: 25

## 2023-01-03 PROCEDURE — 70450 CT HEAD/BRAIN W/O DYE: CPT | Mod: MA

## 2023-01-03 RX ORDER — SODIUM CHLORIDE 9 MG/ML
1000 INJECTION, SOLUTION INTRAVENOUS ONCE
Refills: 0 | Status: COMPLETED | OUTPATIENT
Start: 2023-01-03 | End: 2023-01-03

## 2023-01-03 RX ORDER — MECLIZINE HCL 12.5 MG
25 TABLET ORAL ONCE
Refills: 0 | Status: COMPLETED | OUTPATIENT
Start: 2023-01-03 | End: 2023-01-03

## 2023-01-03 RX ORDER — ACETAMINOPHEN 500 MG
975 TABLET ORAL ONCE
Refills: 0 | Status: COMPLETED | OUTPATIENT
Start: 2023-01-03 | End: 2023-01-03

## 2023-01-03 RX ORDER — MECLIZINE HCL 12.5 MG
1 TABLET ORAL
Qty: 12 | Refills: 0
Start: 2023-01-03 | End: 2023-01-05

## 2023-01-03 RX ADMIN — Medication 25 MILLIGRAM(S): at 17:03

## 2023-01-03 RX ADMIN — Medication 975 MILLIGRAM(S): at 17:03

## 2023-01-03 RX ADMIN — SODIUM CHLORIDE 1000 MILLILITER(S): 9 INJECTION, SOLUTION INTRAVENOUS at 17:03

## 2023-01-03 NOTE — ED PROVIDER NOTE - RAPID ASSESSMENT
ap- 37 F w/ hypothyoridism, GERD, presents to the ER w/ headache, pain in the arm in the hip (8 days) and w/ green discharge from the vagina. Pt w/ headache that she thinks it is a migraine, for 1 month, pt offered tylenol but doesn't want medication in the WR. pt sitting in the chair comfortably moving arms and ambulatory w/ out difficulty      [id number]     Attending Note --     I saw the patient waiting area via televideo connection; a brief history was taken and a thorough physical exam was not performed as there is no physical exam room available.  The patient will be seen and further worked up in the main emergency department and their care will be completed by the main emergency department team.  I was not involved in this patient's care during the [facility]DOC process, and unless otherwise noted in the ED provider note, was not involved in their care during their ED course.

## 2023-01-03 NOTE — ED PROVIDER NOTE - PROGRESS NOTE DETAILS
Adria Ortiz PA-C: Patient feeling better dizziness much improved.  Labs and neuroimaging unremarkable.  UA with epithelial cells, will repeat UA/UC and send patient home given without urinary complaints.  All results and discharge instructions discussed with patient.  Return precautions given.  Patient will follow up at medical clinic where she receives care. Meclizine sent to confirm patient pharmacy Adria Ortiz PA-C: Patient feeling better dizziness much improved.  Labs and neuroimaging unremarkable.  UA with epithelial cells, will repeat UA/UC and send patient home given without urinary complaints.  All results and discharge instructions discussed with patient.  Return precautions given.  Patient will follow up at medical clinic where she receives care. Meclizine sent to confirm patient pharmacy.  [id number]

## 2023-01-03 NOTE — ED PROVIDER NOTE - CLINICAL SUMMARY MEDICAL DECISION MAKING FREE TEXT BOX
ISABEL Nelson MD: 36 y/o Faroese speaking female, hx obtained using  listed above, with PMH migraines who p/w c/o intermittent dizziness since yesterday. Described as spinning sensation. Not worsened by position. Pt also c/o mild L-sided HA with nausea. Pt with URI 2 weeks ago. Also c/o b/l chest discomfort down arms, which has since improved. Pt also c/o green vaginal discharge 8 days ago, none currently. Denies current CP, abd pain, SOB, focal neurologic deficits. Physical exam including neurologic exam benign. Normal VS. Plan: meclizine for dizziness, tylenol for HA, basic labs, hcg, 12-lead ekg, reassess for improvement. If no improvement in sx, will order further brain imaging and consult neurology

## 2023-01-03 NOTE — ED PROVIDER NOTE - NS ED ATTENDING STATEMENT MOD
This was a shared visit with the MILO. I reviewed and verified the documentation and independently performed the documented:

## 2023-01-03 NOTE — ED ADULT NURSE NOTE - OBJECTIVE STATEMENT
37 year old female presents to ED via walk in complaining of dizziness and headache x4 days. States she has been dizzy and been having chest pain radiating down left arm. Denies falls. Upon assessment A&O x4, ambulatory with steady gait and well appearing. Patient is Turkmen speaking, therefore for primary history taking, used Turkmen interpretation services, ID #[id number], [place]. Denies chest pain at this time. Seen by QDOC RN, IV Placed by [facility] RN. Bed locked and lowered. Comfort and safety measures maintained.

## 2023-01-03 NOTE — ED ADULT NURSE NOTE - NSIMPLEMENTINTERV_GEN_ALL_ED
Implemented All Universal Safety Interventions:  [place] to call system. Call bell, personal items and telephone within reach. Instruct patient to call for assistance. Room bathroom lighting operational. Non-slip footwear when patient is off stretcher. Physically safe environment: no spills, clutter or unnecessary equipment. Stretcher in lowest position, wheels locked, appropriate side rails in place.

## 2023-01-03 NOTE — ED PROVIDER NOTE - NSFOLLOWUPINSTRUCTIONS_ED_ALL_ED_FT
1) Follow-up with your primary care provider within 2-3 days.    2) Take Meclizine 25mg every 6 hours as needed for vertigo symptoms.     3) Continue to take all other medications as directed.    4) Return to the emergency room immediately if your symptoms worsen or persist, or if you have fever, headache, change in vision, numbness, tingling, weakness, difficulty speaking, walking, swallowing, or if any other concerning or questionable symptoms.

## 2023-01-03 NOTE — ED PROVIDER NOTE - OBJECTIVE STATEMENT
[id number]  37-year-old Faroese-speaking female past medical history migraine headaches not on any medications daily presenting to ED complaining of intermittent dizziness since yesterday while shopping.  Patient states she feels like she is spinning.  Dizziness does not seem to be affected by position.  Had URI 2 weeks ago.  Associated mild L sided headache, nausea, bilateral nonexertional chest discomfort and arm pain. No vomiting or vision changes. Pt endorses green discharge from vagina 8 days ago. Denies pregnancy. LMP mid December. No current abdominal or pelvic pain. Occasional chills but no fevers.  [id number]  37-year-old Latvian-speaking female reported past medical history migraine headaches not on any medications daily presenting to ED complaining of intermittent dizziness since yesterday while shopping.  Patient states she feels like she is spinning.  Dizziness does not seem to be affected by position.  Had URI 2 weeks ago.  Associated mild L sided headache, nausea, bilateral nonexertional chest discomfort and arm pain. No vomiting or vision changes. Pt endorses green discharge from vagina 8 days ago. Denies pregnancy. LMP mid December. No current abdominal or pelvic pain. Occasional chills but no fevers.

## 2023-01-03 NOTE — ED ADULT NURSE NOTE - BEFAST BALANCE
Initiate Treatment: -clobetasol cream 0.05% apply to back twice a day for 2 weeks, once a day for 1 week then discontinue.
Detail Level: Zone
Initiate Treatment: -RCD glycolic lotion qhs
No

## 2023-01-03 NOTE — ED ADULT NURSE REASSESSMENT NOTE - NS ED NURSE REASSESS COMMENT FT1
Report received from Tiff JIMÉNEZ. AOx3, unlabored, spontaneous respirations. Patient reports improvement of symptoms,  used, ID [id number], name: Robson. PA reviewed plan of care and discharge.

## 2023-01-03 NOTE — ED PROVIDER NOTE - PHYSICAL EXAMINATION
GEN: Pt well appearing in NAD, alert.  PSYCH: Affect and mood appropriate.  EYES: Sclera white w/o injection, EOMI, PERRLA.   ENT: MMM. Neck supple. Airway patent.  RESP: CTA b/l, no wheezes, rales, or rhonchi.   CARDIAC: RRR, clear distinct S1, S2, no appreciable murmurs.  ABD: Soft, non-tender. No CVAT b/l.  MSK: MAHAN. FROM throughout.  NEURO: No focal motor or sensory deficits. Strength 5/5 throughout. Steady gait.  VASC: Strong distal pulses. No edema. No calf tenderness.  SKIN: No rashes or lesions.

## 2023-01-03 NOTE — ED PROVIDER NOTE - PATIENT PORTAL LINK FT
You can access the FollowMyHealth Patient Portal offered by [facility] by registering at the following website: [email or web address]. By joining [employer]’s FollowMyHealth portal, you will also be able to view your health information using other applications (apps) compatible with our system.

## 2023-01-03 NOTE — ED PROVIDER NOTE - CARE PLAN
1 Principal Discharge DX:	BPPV (benign paroxysmal positional vertigo)   Patient/Caregiver provided printed discharge information.

## 2023-01-03 NOTE — ED PROVIDER NOTE - TEST CONSIDERED BUT NOT PERFORMED
Tests Considered But Not Performed CTA head/neck - not performed initially. Would perform and consult Neuro if pt's sx do not improve with initial mgt

## 2023-01-04 LAB
C TRACH RRNA SPEC QL NAA+PROBE: SIGNIFICANT CHANGE UP
CULTURE RESULTS: SIGNIFICANT CHANGE UP
N GONORRHOEA RRNA SPEC QL NAA+PROBE: SIGNIFICANT CHANGE UP
SPECIMEN SOURCE: SIGNIFICANT CHANGE UP
SPECIMEN SOURCE: SIGNIFICANT CHANGE UP

## 2023-01-10 NOTE — PATIENT PROFILE OB - ANESTHESIA, PREVIOUS REACTION, PROFILE
Impression: S/P CE/Standard IOL OD - 22 Days. Presence of intraocular lens  Z96.1. Plan: S/P Cataract extraction - Mild remaining inflammation OD. Use Prednisolone BID in the operated eye for two weeks. Use Ketorolac QID OU. Advised no heavy lifting or strenuous activity for one week. Advised no swimming for three weeks. Use eye shield at night for the first week. Patient advised to call immediately for any problems including, but not limited to, pain, redness, and decreased vision. Advised to bring eyedrops to each visit. Patient stated an understanding of all the instructions. Follow-up in approximately one month with Dr. Yael Sofia, then may return to the care of Dr. Elena Garcia after that (pt lives on [facility]) / prn.
none

## 2023-01-19 ENCOUNTER — OUTPATIENT (OUTPATIENT)
Dept: OUTPATIENT SERVICES | Facility: HOSPITAL | Age: 38
LOS: 1 days | End: 2023-01-19
Payer: SELF-PAY

## 2023-01-19 ENCOUNTER — APPOINTMENT (OUTPATIENT)
Dept: INTERNAL MEDICINE | Facility: CLINIC | Age: 38
End: 2023-01-19
Payer: COMMERCIAL

## 2023-01-19 VITALS
WEIGHT: 183 LBS | BODY MASS INDEX: 33.68 KG/M2 | SYSTOLIC BLOOD PRESSURE: 106 MMHG | HEART RATE: 75 BPM | DIASTOLIC BLOOD PRESSURE: 78 MMHG | HEIGHT: 62 IN | OXYGEN SATURATION: 96 %

## 2023-01-19 DIAGNOSIS — I10 ESSENTIAL (PRIMARY) HYPERTENSION: ICD-10-CM

## 2023-01-19 DIAGNOSIS — G43.809 OTHER MIGRAINE, NOT INTRACTABLE, W/OUT STATUS MIGRAINOSUS: ICD-10-CM

## 2023-01-19 DIAGNOSIS — Z23 ENCOUNTER FOR IMMUNIZATION: ICD-10-CM

## 2023-01-19 DIAGNOSIS — F32.A DEPRESSION, UNSPECIFIED: ICD-10-CM

## 2023-01-19 PROCEDURE — ZZZZZ: CPT

## 2023-01-19 PROCEDURE — 90688 IIV4 VACCINE SPLT 0.5 ML IM: CPT

## 2023-01-19 PROCEDURE — G0463: CPT | Mod: 25

## 2023-01-19 PROCEDURE — G0008: CPT

## 2023-01-19 RX ORDER — NORTRIPTYLINE HYDROCHLORIDE 10 MG/1
10 CAPSULE ORAL
Qty: 30 | Refills: 3 | Status: ACTIVE | COMMUNITY
Start: 2020-08-04 | End: 1900-01-01

## 2023-01-19 NOTE — PLAN
[FreeTextEntry1] : Plan: \par \par Restart nortriptyline 10 \par \par Restart Magnesium  \par \par Referred to illness anxiety therapy \par \par Gyn referral \par \par F/u for obesity and address diet and exercise with next visit \par \par Flu vaccine

## 2023-01-19 NOTE — INTERPRETER SERVICES
[[employer] ] : provided by [employer]   [Interpreters_IDNumber] : [id number] [Interpreters_FullName] : Elvie [TWNoteComboBox1] : Australian

## 2023-01-19 NOTE — HISTORY OF PRESENT ILLNESS
[FreeTextEntry8] : #Headache \par \par Went to the emergency room 2 weeks ago because she was having head pain radiating to her left arm and left head. At that time had dizziness and vertigo as the main issues. Received a CT head which was negative for acu.  Continues to have left side headache that radiates to arm and back. Has tried vestibular therapy 2 years ago was only told to continue with stretches.  \par \par  \par \par Had taken nortriptyline in the past, has not received the medications in months as our office had d.c’d it. She felt this medication helped with the headaches and she states her greatest benefit was the xmg. She used to take magnesium and nortriptyline. Meclizine helps only a little.  \par \par  \par \par The headaches used to be preceded by a very strong pain in the ear, it would be bilateral at times but moreso on the left. Has the headache every day and it is affecting her qol. If she sits straight up for >2min she will feel dizzy. Pain is currently a 4-5 and at max is an 8. The dizziness is the worst part. Denies hearing loss, denies tinnitus.   \par \par  \par \par  \par \par #Chest discomfort \par \par Also Reports chest pain with exertion. Able to walk 5-6 minutes before being short of breath and tired. She will have chest pain even without ongoing headache. Usually pain is at both sides, and radiates down her left flank/back. The pain only happens when she is working or walking. Describes as a chest pressure, associated with anxiety. The headache is every day, but sometimes having chest pain. The pain is relieved with rest for 1-2 minutes. ECG on 1/6/2023 w.o ST abnormalities. No hx of heart disease in family.  \par \par  \par \par  \par \par #Abdominal pain \par \par Takes medications for GERD prn, usually takes OTC: pepcid. Occasional total abdominal cramps which improve with passing gas.  \par \par  \par \par #Vaginal discharge  \par \par Also complaining of vaginal green-ness. Happened one time 2 weeks ago. Not reporting any discharge, changes in urination, or pain. Reports no changes in menstrual period. Has not happened since. \par \par  \par \par Fhx: Mhx diabetes – no hx of heart disease in family.  \par \par Brother diabetes

## 2023-01-23 DIAGNOSIS — G43.809 OTHER MIGRAINE, NOT INTRACTABLE, WITHOUT STATUS MIGRAINOSUS: ICD-10-CM

## 2023-01-23 DIAGNOSIS — Z23 ENCOUNTER FOR IMMUNIZATION: ICD-10-CM

## 2023-01-23 DIAGNOSIS — F32.A DEPRESSION, UNSPECIFIED: ICD-10-CM

## 2023-01-26 ENCOUNTER — OUTPATIENT (OUTPATIENT)
Dept: OUTPATIENT SERVICES | Facility: HOSPITAL | Age: 38
LOS: 1 days | End: 2023-01-26
Payer: SELF-PAY

## 2023-01-26 ENCOUNTER — APPOINTMENT (OUTPATIENT)
Dept: OBGYN | Facility: CLINIC | Age: 38
End: 2023-01-26
Payer: COMMERCIAL

## 2023-01-26 ENCOUNTER — RESULT CHARGE (OUTPATIENT)
Age: 38
End: 2023-01-26

## 2023-01-26 ENCOUNTER — LABORATORY RESULT (OUTPATIENT)
Age: 38
End: 2023-01-26

## 2023-01-26 VITALS — WEIGHT: 187 LBS | DIASTOLIC BLOOD PRESSURE: 78 MMHG | SYSTOLIC BLOOD PRESSURE: 118 MMHG | BODY MASS INDEX: 34.2 KG/M2

## 2023-01-26 DIAGNOSIS — M54.50 LOW BACK PAIN, UNSPECIFIED: ICD-10-CM

## 2023-01-26 DIAGNOSIS — B37.31 ACUTE CANDIDIASIS OF VULVA AND VAGINA: ICD-10-CM

## 2023-01-26 DIAGNOSIS — R10.2 PELVIC AND PERINEAL PAIN: ICD-10-CM

## 2023-01-26 DIAGNOSIS — N76.0 ACUTE VAGINITIS: ICD-10-CM

## 2023-01-26 PROCEDURE — 96372 THER/PROPH/DIAG INJ SC/IM: CPT | Mod: NC,GC

## 2023-01-26 PROCEDURE — 99213 OFFICE O/P EST LOW 20 MIN: CPT | Mod: GC,25

## 2023-01-26 PROCEDURE — 87624 HPV HI-RISK TYP POOLED RSLT: CPT

## 2023-01-26 PROCEDURE — G0463: CPT

## 2023-01-26 PROCEDURE — 87800 DETECT AGNT MULT DNA DIREC: CPT

## 2023-01-26 PROCEDURE — 81025 URINE PREGNANCY TEST: CPT

## 2023-01-26 PROCEDURE — 87591 N.GONORRHOEAE DNA AMP PROB: CPT

## 2023-01-26 PROCEDURE — 87491 CHLMYD TRACH DNA AMP PROBE: CPT

## 2023-01-26 PROCEDURE — 96372 THER/PROPH/DIAG INJ SC/IM: CPT

## 2023-01-26 PROCEDURE — T1013: CPT

## 2023-01-26 RX ORDER — FLUCONAZOLE 150 MG/1
150 TABLET ORAL
Qty: 2 | Refills: 0 | Status: ACTIVE | COMMUNITY
Start: 2023-01-26 | End: 1900-01-01

## 2023-01-26 RX ORDER — MEDROXYPROGESTERONE ACETATE 150 MG/ML
150 INJECTION, SUSPENSION INTRAMUSCULAR
Qty: 0 | Refills: 0 | Status: COMPLETED | OUTPATIENT
Start: 2023-01-26

## 2023-01-26 NOTE — PHYSICAL EXAM
[Chaperone Present] : A chaperone was present in the examining room during all aspects of the physical examination [Appropriately responsive] : appropriately responsive [Alert] : alert [No Acute Distress] : no acute distress [No Lymphadenopathy] : no lymphadenopathy [Regular Rate Rhythm] : regular rate rhythm [No Murmurs] : no murmurs [Clear to Auscultation B/L] : clear to auscultation bilaterally [Soft] : soft [Non-tender] : non-tender [Non-distended] : non-distended [No HSM] : No HSM [No Lesions] : no lesions [No Mass] : no mass [Oriented x3] : oriented x3 [Examination Of The Breasts] : a normal appearance [No Masses] : no breast masses were palpable [Labia Majora] : normal [Labia Minora] : normal [Discharge] : discharge [Scant] : scant [White] : white [Cheesy] : cheesy [Normal] : normal [Uterine Adnexae] : normal

## 2023-01-27 LAB
C TRACH RRNA SPEC QL NAA+PROBE: SIGNIFICANT CHANGE UP
CANDIDA AB TITR SER: DETECTED
G VAGINALIS DNA SPEC QL NAA+PROBE: DETECTED
HCG UR QL: NEGATIVE
HPV HIGH+LOW RISK DNA PNL CVX: SIGNIFICANT CHANGE UP
N GONORRHOEA RRNA SPEC QL NAA+PROBE: SIGNIFICANT CHANGE UP
QUALITY CONTROL: YES
SPECIMEN SOURCE: SIGNIFICANT CHANGE UP
T VAGINALIS SPEC QL WET PREP: SIGNIFICANT CHANGE UP

## 2023-01-27 RX ORDER — METRONIDAZOLE 7.5 MG/G
0.75 GEL VAGINAL
Qty: 1 | Refills: 0 | Status: ACTIVE | COMMUNITY
Start: 2023-01-27 | End: 1900-01-01

## 2023-01-27 NOTE — END OF VISIT
[] : Resident [FreeTextEntry3] : Attending Note\par \par Pt seen at bedside, agree with above plan of care. Depo given today. To return in 3 months for discussion regarding alternatives for contraception and full annual assessment (breast exam). all questions answered, pt in agreement\par Kristen Bangura MD

## 2023-01-27 NOTE — DISCUSSION/SUMMARY
[FreeTextEntry1] : 36 yo  LMP 23 evaluated for c/o green vaginal discharge w/ associated back and lower abd pain that began 2 months ago.\par \par #Vaginal discharge\par - exam c/w candida; Empirically treat w/ Diflucan\par - Affirm sent\par - GC/CL sent \par \par #Back and lower abdominal pain\par - TVUS sent \par \par #HCM\par - Depo-Provera injection administered \par - Pap/HRHPV sent \par - RTC in 3/4 months for annual physical \par \par Patient seen and evaluated w/ Dr. Bangura, Attending Physician \par \par Anjelica Rousseau, PGY1\par

## 2023-01-27 NOTE — HISTORY OF PRESENT ILLNESS
[FreeTextEntry1] : 38 yo  LMP 23  presents for c/o green vaginal discharge that began 2 months ago. Reports associated foul vaginal odor, B/L back and lower abd pain. Patient also requesting Depo-provera for contraception. Reports she hasn't had an injection in over 2 years. Denies fever, chills, CP/SOB, palpitations, N/V/D, constipation, urinary frequency/urgency, urinary odor, hematuria, vaginal bleeding.\par \par \par Menstrual hx: monthly periods, moderate flow, occasionally heavier. \par SexualHx: Sexually active with one male partner, does not use contraception \par OBHx: , 1x 23 wk loss 2/2 cervical insuffiency, 1xC/S@34 weeks. 1xTOP \par GynHx: Denies; Pap 2017 HRHPV NEG \par PMSHx: Dizziness "vestibular cause" sees neuo and alana, migraines \par Meds: magnesium for migraines \par All: NKDA \par FamHx: No cancer hx in family. Brother with hepatitis \par SocHx: Denies alcohol, tobacco, drugs. Feels safe at home. Exercises, eats a healthy diet, wears a seatbelt. \par \par \par \par \par

## 2023-01-30 LAB — CYTOLOGY SPEC DOC CYTO: SIGNIFICANT CHANGE UP

## 2023-02-01 DIAGNOSIS — M54.50 LOW BACK PAIN, UNSPECIFIED: ICD-10-CM

## 2023-02-01 DIAGNOSIS — Z30.42 ENCOUNTER FOR SURVEILLANCE OF INJECTABLE CONTRACEPTIVE: ICD-10-CM

## 2023-02-01 DIAGNOSIS — R10.2 PELVIC AND PERINEAL PAIN: ICD-10-CM

## 2023-02-01 DIAGNOSIS — B37.31 ACUTE CANDIDIASIS OF VULVA AND VAGINA: ICD-10-CM

## 2023-02-02 NOTE — ED ADULT NURSE NOTE - NSSUHOSCREENINGYN_ED_ALL_ED
Final Anesthesia Post-op Assessment    Patient: Yolie Roman  Procedure(s) Performed: RIGHT SHOULDER REMOVAL OF HARDWARE - RIGHT  Anesthesia type: General    Vitals Value Taken Time   Temp 36.1 °C (97 °F) 02/02/23 1045   Pulse 80 02/02/23 1105   Resp 28 02/02/23 1105   SpO2 93 % 02/02/23 1105   /59 02/02/23 1105            Anesthesia Post-op Note      Patient: Yolie Roman      Mental status recovered.  VS noted and are stable.  Respiratory function satisfactory; airway patent.  Cardiovascular function and hydration status satisfactory.  Adequate pain control.  Nausea and vomiting control satisfactory.  No apparent anesthetic complications.     
Yes - the patient is able to be screened

## 2023-02-07 ENCOUNTER — APPOINTMENT (OUTPATIENT)
Dept: ULTRASOUND IMAGING | Facility: CLINIC | Age: 38
End: 2023-02-07
Payer: COMMERCIAL

## 2023-02-07 ENCOUNTER — OUTPATIENT (OUTPATIENT)
Dept: OUTPATIENT SERVICES | Facility: HOSPITAL | Age: 38
LOS: 1 days | End: 2023-02-07
Payer: SELF-PAY

## 2023-02-07 DIAGNOSIS — B37.31 ACUTE CANDIDIASIS OF VULVA AND VAGINA: ICD-10-CM

## 2023-02-07 DIAGNOSIS — M54.50 LOW BACK PAIN, UNSPECIFIED: ICD-10-CM

## 2023-02-07 DIAGNOSIS — Z30.42 ENCOUNTER FOR SURVEILLANCE OF INJECTABLE CONTRACEPTIVE: ICD-10-CM

## 2023-02-07 DIAGNOSIS — R10.2 PELVIC AND PERINEAL PAIN: ICD-10-CM

## 2023-02-07 PROCEDURE — 76830 TRANSVAGINAL US NON-OB: CPT | Mod: 26

## 2023-02-07 PROCEDURE — 76830 TRANSVAGINAL US NON-OB: CPT

## 2023-04-10 ENCOUNTER — OUTPATIENT (OUTPATIENT)
Dept: OUTPATIENT SERVICES | Facility: HOSPITAL | Age: 38
LOS: 1 days | End: 2023-04-10
Payer: SELF-PAY

## 2023-04-10 ENCOUNTER — APPOINTMENT (OUTPATIENT)
Dept: OBGYN | Facility: CLINIC | Age: 38
End: 2023-04-10
Payer: COMMERCIAL

## 2023-04-10 ENCOUNTER — LABORATORY RESULT (OUTPATIENT)
Age: 38
End: 2023-04-10

## 2023-04-10 VITALS — DIASTOLIC BLOOD PRESSURE: 70 MMHG | BODY MASS INDEX: 33.38 KG/M2 | WEIGHT: 182.5 LBS | SYSTOLIC BLOOD PRESSURE: 112 MMHG

## 2023-04-10 DIAGNOSIS — N89.8 OTHER SPECIFIED NONINFLAMMATORY DISORDERS OF VAGINA: ICD-10-CM

## 2023-04-10 DIAGNOSIS — N76.0 ACUTE VAGINITIS: ICD-10-CM

## 2023-04-10 PROCEDURE — 36415 COLL VENOUS BLD VENIPUNCTURE: CPT

## 2023-04-10 PROCEDURE — 99214 OFFICE O/P EST MOD 30 MIN: CPT

## 2023-04-10 PROCEDURE — G0463: CPT

## 2023-04-10 PROCEDURE — 87591 N.GONORRHOEAE DNA AMP PROB: CPT

## 2023-04-10 PROCEDURE — 87800 DETECT AGNT MULT DNA DIREC: CPT

## 2023-04-10 PROCEDURE — T1013: CPT

## 2023-04-10 PROCEDURE — 81003 URINALYSIS AUTO W/O SCOPE: CPT

## 2023-04-10 PROCEDURE — 87491 CHLMYD TRACH DNA AMP PROBE: CPT

## 2023-04-10 NOTE — HISTORY OF PRESENT ILLNESS
[Definite:  ___ (Date)] : the last menstrual period was [unfilled] [Normal Amount/Duration] : was of a normal amount and duration [Spotting Between  Menses] : no spotting between menses [Regular Cycle Intervals] : periods have been irregular [Sexually Active] : is sexually active [Monogamous] : is monogamous

## 2023-04-11 LAB
C TRACH RRNA SPEC QL NAA+PROBE: SIGNIFICANT CHANGE UP
CANDIDA AB TITR SER: SIGNIFICANT CHANGE UP
G VAGINALIS DNA SPEC QL NAA+PROBE: SIGNIFICANT CHANGE UP
N GONORRHOEA RRNA SPEC QL NAA+PROBE: SIGNIFICANT CHANGE UP
SPECIMEN SOURCE: SIGNIFICANT CHANGE UP
T VAGINALIS SPEC QL WET PREP: SIGNIFICANT CHANGE UP

## 2023-04-19 DIAGNOSIS — N89.8 OTHER SPECIFIED NONINFLAMMATORY DISORDERS OF VAGINA: ICD-10-CM

## 2023-04-26 ENCOUNTER — OUTPATIENT (OUTPATIENT)
Dept: OUTPATIENT SERVICES | Facility: HOSPITAL | Age: 38
LOS: 1 days | End: 2023-04-26
Payer: SELF-PAY

## 2023-04-26 ENCOUNTER — RESULT CHARGE (OUTPATIENT)
Age: 38
End: 2023-04-26

## 2023-04-26 ENCOUNTER — MED ADMIN CHARGE (OUTPATIENT)
Age: 38
End: 2023-04-26

## 2023-04-26 ENCOUNTER — APPOINTMENT (OUTPATIENT)
Dept: OBGYN | Facility: CLINIC | Age: 38
End: 2023-04-26
Payer: COMMERCIAL

## 2023-04-26 DIAGNOSIS — Z30.42 ENCOUNTER FOR SURVEILLANCE OF INJECTABLE CONTRACEPTIVE: ICD-10-CM

## 2023-04-26 DIAGNOSIS — N76.0 ACUTE VAGINITIS: ICD-10-CM

## 2023-04-26 LAB
HCG UR QL: NEGATIVE
QUALITY CONTROL: YES

## 2023-04-26 PROCEDURE — 96372 THER/PROPH/DIAG INJ SC/IM: CPT

## 2023-04-26 PROCEDURE — 81025 URINE PREGNANCY TEST: CPT

## 2023-04-26 PROCEDURE — 96372 THER/PROPH/DIAG INJ SC/IM: CPT | Mod: NC

## 2023-04-26 RX ORDER — MEDROXYPROGESTERONE ACETATE 150 MG/ML
150 INJECTION, SUSPENSION INTRAMUSCULAR
Qty: 0 | Refills: 0 | Status: COMPLETED | OUTPATIENT
Start: 2023-04-26

## 2023-05-08 RX ORDER — OMEGA-3/DHA/EPA/FISH OIL 300-1000MG
400 CAPSULE ORAL
Qty: 30 | Refills: 1 | Status: ACTIVE | COMMUNITY
Start: 2022-12-22 | End: 1900-01-01

## 2023-05-11 DIAGNOSIS — Z30.42 ENCOUNTER FOR SURVEILLANCE OF INJECTABLE CONTRACEPTIVE: ICD-10-CM

## 2023-10-25 NOTE — ED ADULT NURSE NOTE - CAS TRG GENERAL AIRWAY, MLM
Care Transitions Outreach Attempt    Attempted to reach patient for transitions of care follow up. Unable to reach patient. Patient: Rigo Magallon Patient : 1982 MRN: [id number]    Last Discharge Facility       Date Complaint Diagnosis Description Type Department Provider    10/23/23 Flank Pain; Hematuria Right flank pain ED (ELOPEMENT) [facility] ED Jennifer Fregoso MD            Noted following upcoming appointments from discharge chart review:   [facility] follow up appointment(s):   Future Appointments   Date Time Provider [address]   10/30/2023 10:20 AM PAPA Baker - CNP [facility] IM MED NAEEM Villasenor     Attempted to contact pt for care transition follow up. Unable to reach pt. CTN did not leave a message d/t voicemail message stating you have reached Mary Saucedoper. Will try again at a later time.       Leonel Blakely RN Patent

## 2024-04-03 NOTE — END OF VISIT
[] : Resident [FreeTextEntry3] : Several issues as above with negative w/u by ENT, Neuro, ophtho, and GI. \par Trial of treating for allergic rhinitis/PND - counseled in use of medication. Short interval f/u -  if no resolution of sx, consider labs and possibly US to eval for thyroiditis, swallowing study to r/o functional etiology.  no
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
## 2024-07-19 NOTE — DISCHARGE NOTE OB - PROVIDER RX CONTACT NUMBER
July 30, 2024      Elizabeth Carvalho  [address] [place] [address]                Dear Ms. Carvalho:    Miguel Angel Rodriges MD has ordered a colonoscopy for you and we would like to schedule that procedure.     Please call Jeannette [phone] at your earliest convenience.      If you have any questions or concerns, we would be more than happy to discuss them with you. We look forward to assisting you with your health care needs.      Sincerely,  Jeannette [phone]  Surgery Scheduler for Miguel Angel Rodriges MD  [facility] Pre-Admit Department     [phone]

## 2024-09-17 NOTE — ED PROVIDER NOTE - DIFFERENTIAL DIAGNOSIS
room air Differential Diagnosis Ddx includes, however, is not limited to: BPPV, dehydration, labyrinthitis, CVA, other

## 2024-12-16 ENCOUNTER — MED ADMIN CHARGE (OUTPATIENT)
Age: 39
End: 2024-12-16

## 2024-12-16 ENCOUNTER — OUTPATIENT (OUTPATIENT)
Dept: OUTPATIENT SERVICES | Facility: HOSPITAL | Age: 39
LOS: 1 days | End: 2024-12-16
Payer: SELF-PAY

## 2024-12-16 ENCOUNTER — APPOINTMENT (OUTPATIENT)
Dept: INTERNAL MEDICINE | Facility: CLINIC | Age: 39
End: 2024-12-16

## 2024-12-16 VITALS
HEIGHT: 62 IN | WEIGHT: 199 LBS | BODY MASS INDEX: 36.62 KG/M2 | OXYGEN SATURATION: 98 % | HEART RATE: 91 BPM | DIASTOLIC BLOOD PRESSURE: 78 MMHG | SYSTOLIC BLOOD PRESSURE: 110 MMHG

## 2024-12-16 DIAGNOSIS — E66.9 OBESITY, UNSPECIFIED: ICD-10-CM

## 2024-12-16 DIAGNOSIS — I10 ESSENTIAL (PRIMARY) HYPERTENSION: ICD-10-CM

## 2024-12-16 DIAGNOSIS — Z80.9 FAMILY HISTORY OF MALIGNANT NEOPLASM, UNSPECIFIED: ICD-10-CM

## 2024-12-16 DIAGNOSIS — Z23 ENCOUNTER FOR IMMUNIZATION: ICD-10-CM

## 2024-12-16 DIAGNOSIS — R42 DIZZINESS AND GIDDINESS: ICD-10-CM

## 2024-12-16 DIAGNOSIS — Z00.00 ENCOUNTER FOR GENERAL ADULT MEDICAL EXAMINATION W/OUT ABNORMAL FINDINGS: ICD-10-CM

## 2024-12-16 PROCEDURE — 80061 LIPID PANEL: CPT

## 2024-12-16 PROCEDURE — G0463: CPT

## 2024-12-16 PROCEDURE — T1013: CPT

## 2024-12-16 PROCEDURE — 84443 ASSAY THYROID STIM HORMONE: CPT

## 2024-12-16 PROCEDURE — 90656 IIV3 VACC NO PRSV 0.5 ML IM: CPT

## 2024-12-16 PROCEDURE — 86706 HEP B SURFACE ANTIBODY: CPT

## 2024-12-16 PROCEDURE — G0008: CPT

## 2024-12-16 PROCEDURE — 86803 HEPATITIS C AB TEST: CPT

## 2024-12-16 PROCEDURE — 87340 HEPATITIS B SURFACE AG IA: CPT

## 2024-12-16 PROCEDURE — 83036 HEMOGLOBIN GLYCOSYLATED A1C: CPT

## 2024-12-16 PROCEDURE — 80053 COMPREHEN METABOLIC PANEL: CPT

## 2024-12-16 PROCEDURE — 99203 OFFICE O/P NEW LOW 30 MIN: CPT | Mod: GE,25

## 2024-12-16 PROCEDURE — 85027 COMPLETE CBC AUTOMATED: CPT

## 2024-12-18 ENCOUNTER — NON-APPOINTMENT (OUTPATIENT)
Age: 39
End: 2024-12-18

## 2024-12-18 LAB
ALBUMIN SERPL ELPH-MCNC: 4.5 G/DL
ALP BLD-CCNC: 81 U/L
ALT SERPL-CCNC: 59 U/L
ANION GAP SERPL CALC-SCNC: 15 MMOL/L
AST SERPL-CCNC: 32 U/L
BILIRUB SERPL-MCNC: 0.3 MG/DL
BUN SERPL-MCNC: 11 MG/DL
CALCIUM SERPL-MCNC: 9.5 MG/DL
CHLORIDE SERPL-SCNC: 103 MMOL/L
CHOLEST SERPL-MCNC: 233 MG/DL
CO2 SERPL-SCNC: 23 MMOL/L
CREAT SERPL-MCNC: 0.71 MG/DL
EGFR: 112 ML/MIN/1.73M2
ESTIMATED AVERAGE GLUCOSE: 105 MG/DL
GLUCOSE SERPL-MCNC: 88 MG/DL
HBA1C MFR BLD HPLC: 5.3 %
HBV SURFACE AB SER QL: NONREACTIVE
HBV SURFACE AG SER QL: NONREACTIVE
HCT VFR BLD CALC: 47 %
HCV AB SER QL: NONREACTIVE
HCV S/CO RATIO: 0.08 S/CO
HDLC SERPL-MCNC: 62 MG/DL
HGB BLD-MCNC: 14.9 G/DL
LDLC SERPL CALC-MCNC: 127 MG/DL
MCHC RBC-ENTMCNC: 31.7 G/DL
MCHC RBC-ENTMCNC: 32.2 PG
MCV RBC AUTO: 101.5 FL
NONHDLC SERPL-MCNC: 172 MG/DL
PLATELET # BLD AUTO: 218 K/UL
POTASSIUM SERPL-SCNC: 3.8 MMOL/L
PROT SERPL-MCNC: 7.3 G/DL
RBC # BLD: 4.63 M/UL
RBC # FLD: 12.4 %
SODIUM SERPL-SCNC: 141 MMOL/L
TRIGL SERPL-MCNC: 252 MG/DL
TSH SERPL-ACNC: 2.66 UIU/ML
WBC # FLD AUTO: 8.2 K/UL

## 2024-12-23 ENCOUNTER — APPOINTMENT (OUTPATIENT)
Dept: INTERNAL MEDICINE | Facility: CLINIC | Age: 39
End: 2024-12-23

## 2024-12-23 ENCOUNTER — OUTPATIENT (OUTPATIENT)
Dept: OUTPATIENT SERVICES | Facility: HOSPITAL | Age: 39
LOS: 1 days | End: 2024-12-23
Payer: SELF-PAY

## 2024-12-23 DIAGNOSIS — Z23 ENCOUNTER FOR IMMUNIZATION: ICD-10-CM

## 2024-12-23 DIAGNOSIS — I10 ESSENTIAL (PRIMARY) HYPERTENSION: ICD-10-CM

## 2024-12-23 PROCEDURE — 90746 HEPB VACCINE 3 DOSE ADULT IM: CPT

## 2024-12-23 PROCEDURE — G0010: CPT

## 2024-12-26 NOTE — HISTORY OF PRESENT ILLNESS
[FreeTextEntry1] : CPE, episodes of lightheadedness/blurry vision/warmth over body [de-identified] : - A month ago, patient reports she felt really hot, and that she was going to pass out. Felt weak and had blurry vision and then she reports she had green vaginal discharge - Regarding vaginal discharge, no vaginal itching. Had malodor after the discharge very briefly. Nothing since - Since then, she has had episodes of feeling warm in her body. Feels weak and with blurry vision  - Endorses that she has had episodes like this for over a year - Has associated chest pain on both sides that last for a minute. 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 Doesn't really hydrate much at work but does eat. - Maybe can happen at home, but vast majority happens at work - Endorses the temperature  - When it happens she will feel better if she lays down at home or walks when at work

## 2024-12-26 NOTE — REVIEW OF SYSTEMS
[Fever] : no fever [Night Sweats] : no night sweats [Recent Change In Weight] : ~T no recent weight change [Discharge] : no discharge [Vision Problems] : no vision problems [Earache] : no earache [Hearing Loss] : no hearing loss [Nasal Discharge] : no nasal discharge [Sore Throat] : no sore throat [Shortness Of Breath] : no shortness of breath [Cough] : no cough [Abdominal Pain] : no abdominal pain [Nausea] : no nausea [Vomiting] : no vomiting [Dysuria] : no dysuria [Hematuria] : no hematuria [Joint Pain] : no joint pain [Muscle Pain] : no muscle pain [Itching] : no itching [de-identified] : Per HPI [FreeTextEntry5] : Per HPI

## 2024-12-26 NOTE — HEALTH RISK ASSESSMENT
[No] : In the past 12 months have you used drugs other than those required for medical reasons? No [1] : 2) Feeling down, depressed, or hopeless for several days (1) [PHQ-2 Negative - No further assessment needed] : PHQ-2 Negative - No further assessment needed [Never] : Never [With Family] : lives with family [# of Members in Household ___] :  household currently consist of [unfilled] member(s) [Employed] : employed [] :  [Feels Safe at Home] : Feels safe at home [Fully functional (bathing, dressing, toileting, transferring, walking, feeding)] : Fully functional (bathing, dressing, toileting, transferring, walking, feeding) [Fully functional (using the telephone, shopping, preparing meals, housekeeping, doing laundry, using] : Fully functional and needs no help or supervision to perform IADLs (using the telephone, shopping, preparing meals, housekeeping, doing laundry, using transportation, managing medications and managing finances) [de-identified] : Does body weight exercises a few times a week [de-identified] : Eats mostly everything. 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

## 2024-12-26 NOTE — REVIEW OF SYSTEMS
[Fever] : no fever [Night Sweats] : no night sweats [Recent Change In Weight] : ~T no recent weight change [Discharge] : no discharge [Vision Problems] : no vision problems [Earache] : no earache [Hearing Loss] : no hearing loss [Nasal Discharge] : no nasal discharge [Sore Throat] : no sore throat [Shortness Of Breath] : no shortness of breath [Cough] : no cough [Abdominal Pain] : no abdominal pain [Nausea] : no nausea [Vomiting] : no vomiting [Dysuria] : no dysuria [Hematuria] : no hematuria [Joint Pain] : no joint pain [Muscle Pain] : no muscle pain [Itching] : no itching [FreeTextEntry5] : Per HPI [de-identified] : Per HPI

## 2024-12-26 NOTE — ASSESSMENT
[FreeTextEntry1] : 39 year old woman with migraines presenting for CPE and with complaint of episodes of lightheadedness, blurry vision, warmth over body  #Orthostasis - Reported symptoms and surround circumstances sound like orthostatic hypotension given when at home it helps when she lays down and at work when she walks - Discussed with patient ways to help prevent occurrence including counter pulsation maneuvers and use of compression stockings - Encouraged her to maintain adequate hydration while also being sure to eat food to have solute to hold onto fluids - Low suspicion for primary cardiac etiology. Hold off on EKG - Will still pursue TSH, A1C, lipid panel for evaluation and risk stratification - Counseled to return to clinic if worsening or new symptoms  #Vaginal discharge - Green discharge more in line with vaginosis, but only one occurrence a month ago per patient endorsement. None since, no malodor, no vaginal itching reported - Hold off on empiric treatment given above  #HCM - CBC, CMP, lipid, A1C, TSH, hep B, hep C - Flu vaccine today - Weight management referral placed - Gyn referral - Not due for pap at this time  Return to clinic in one year for next CPE or sooner as needed   Patient's visit and plan of care discussed with Dr. Ami Frausto MD PGY3

## 2024-12-26 NOTE — INTERPRETER SERVICES
[Other: ______] : provided by SAMANTHA [Time Spent: ____ minutes] : Total time spent using  services: [unfilled] minutes. The patient's primary language is not English thus required  services. [Interpreters_IDNumber] : [id number] [Interpreters_FullName] : Teagan [TWNoteComboBox1] : Monegasque

## 2024-12-26 NOTE — HISTORY OF PRESENT ILLNESS
[FreeTextEntry1] : CPE, episodes of lightheadedness/blurry vision/warmth over body [de-identified] : - A month ago, patient reports she felt really hot, and that she was going to pass out. Felt weak and had blurry vision and then she reports she had green vaginal discharge - Regarding vaginal discharge, no vaginal itching. Had malodor after the discharge very briefly. Nothing since - Since then, she has had episodes of feeling warm in her body. Feels weak and with blurry vision  - Endorses that she has had episodes like this for over a year - Has associated chest pain on both sides that last for a minute. 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 Doesn't really hydrate much at work but does eat. - Maybe can happen at home, but vast majority happens at work - Endorses the temperature  - When it happens she will feel better if she lays down at home or walks when at work

## 2024-12-26 NOTE — HEALTH RISK ASSESSMENT
[No] : In the past 12 months have you used drugs other than those required for medical reasons? No [1] : 2) Feeling down, depressed, or hopeless for several days (1) [PHQ-2 Negative - No further assessment needed] : PHQ-2 Negative - No further assessment needed [Never] : Never [With Family] : lives with family [# of Members in Household ___] :  household currently consist of [unfilled] member(s) [Employed] : employed [] :  [Feels Safe at Home] : Feels safe at home [Fully functional (bathing, dressing, toileting, transferring, walking, feeding)] : Fully functional (bathing, dressing, toileting, transferring, walking, feeding) [Fully functional (using the telephone, shopping, preparing meals, housekeeping, doing laundry, using] : Fully functional and needs no help or supervision to perform IADLs (using the telephone, shopping, preparing meals, housekeeping, doing laundry, using transportation, managing medications and managing finances) [de-identified] : Does body weight exercises a few times a week [de-identified] : Eats mostly everything. 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

## 2024-12-26 NOTE — INTERPRETER SERVICES
[Other: ______] : provided by SAMANTHA [Time Spent: ____ minutes] : Total time spent using  services: [unfilled] minutes. The patient's primary language is not English thus required  services. [Interpreters_IDNumber] : [id number] [Interpreters_FullName] : Teagan [TWNoteComboBox1] : Cameroonian

## 2024-12-30 DIAGNOSIS — E66.9 OBESITY, UNSPECIFIED: ICD-10-CM

## 2024-12-30 DIAGNOSIS — Z80.9 FAMILY HISTORY OF MALIGNANT NEOPLASM, UNSPECIFIED: ICD-10-CM

## 2024-12-30 DIAGNOSIS — R42 DIZZINESS AND GIDDINESS: ICD-10-CM

## 2024-12-30 DIAGNOSIS — Z00.00 ENCOUNTER FOR GENERAL ADULT MEDICAL EXAMINATION WITHOUT ABNORMAL FINDINGS: ICD-10-CM

## 2024-12-30 DIAGNOSIS — Z23 ENCOUNTER FOR IMMUNIZATION: ICD-10-CM

## 2025-01-21 ENCOUNTER — APPOINTMENT (OUTPATIENT)
Dept: INTERNAL MEDICINE | Facility: CLINIC | Age: 40
End: 2025-01-21

## 2025-01-21 ENCOUNTER — OUTPATIENT (OUTPATIENT)
Dept: OUTPATIENT SERVICES | Facility: HOSPITAL | Age: 40
LOS: 1 days | End: 2025-01-21
Payer: SELF-PAY

## 2025-01-21 DIAGNOSIS — Z23 ENCOUNTER FOR IMMUNIZATION: ICD-10-CM

## 2025-01-21 DIAGNOSIS — I10 ESSENTIAL (PRIMARY) HYPERTENSION: ICD-10-CM

## 2025-01-21 PROCEDURE — 90746 HEPB VACCINE 3 DOSE ADULT IM: CPT

## 2025-01-21 PROCEDURE — G0010: CPT

## 2025-01-22 ENCOUNTER — APPOINTMENT (OUTPATIENT)
Dept: OBGYN | Facility: CLINIC | Age: 40
End: 2025-01-22

## 2025-01-22 ENCOUNTER — LABORATORY RESULT (OUTPATIENT)
Age: 40
End: 2025-01-22

## 2025-01-22 ENCOUNTER — OUTPATIENT (OUTPATIENT)
Dept: OUTPATIENT SERVICES | Facility: HOSPITAL | Age: 40
LOS: 1 days | End: 2025-01-22
Payer: SELF-PAY

## 2025-01-22 VITALS — DIASTOLIC BLOOD PRESSURE: 82 MMHG | WEIGHT: 199 LBS | SYSTOLIC BLOOD PRESSURE: 118 MMHG | BODY MASS INDEX: 36.4 KG/M2

## 2025-01-22 DIAGNOSIS — N89.8 OTHER SPECIFIED NONINFLAMMATORY DISORDERS OF VAGINA: ICD-10-CM

## 2025-01-22 DIAGNOSIS — N76.0 ACUTE VAGINITIS: ICD-10-CM

## 2025-01-22 DIAGNOSIS — R30.0 DYSURIA: ICD-10-CM

## 2025-01-22 PROCEDURE — 87661 TRICHOMONAS VAGINALIS AMPLIF: CPT

## 2025-01-22 PROCEDURE — G0463: CPT

## 2025-01-22 PROCEDURE — 99213 OFFICE O/P EST LOW 20 MIN: CPT | Mod: GC

## 2025-01-22 PROCEDURE — 87481 CANDIDA DNA AMP PROBE: CPT

## 2025-01-22 PROCEDURE — 87086 URINE CULTURE/COLONY COUNT: CPT

## 2025-01-22 PROCEDURE — 87591 N.GONORRHOEAE DNA AMP PROB: CPT

## 2025-01-22 PROCEDURE — 81513 NFCT DS BV RNA VAG FLU ALG: CPT

## 2025-01-22 PROCEDURE — 87491 CHLMYD TRACH DNA AMP PROBE: CPT

## 2025-01-23 ENCOUNTER — LABORATORY RESULT (OUTPATIENT)
Age: 40
End: 2025-01-23

## 2025-01-23 LAB
BV BACTERIA RRNA VAG QL NAA+PROBE: DETECTED
C GLABRATA RNA VAG QL NAA+PROBE: SIGNIFICANT CHANGE UP
C TRACH RRNA SPEC QL NAA+PROBE: SIGNIFICANT CHANGE UP
CANDIDA RRNA VAG QL PROBE: DETECTED
CULTURE RESULTS: SIGNIFICANT CHANGE UP
N GONORRHOEA RRNA SPEC QL NAA+PROBE: SIGNIFICANT CHANGE UP
SPECIMEN SOURCE: SIGNIFICANT CHANGE UP
T VAGINALIS RRNA SPEC QL NAA+PROBE: SIGNIFICANT CHANGE UP

## 2025-01-23 NOTE — PHYSICAL EXAM
[Chaperone Declined] : Patient declined chaperone [Appropriately responsive] : appropriately responsive [Alert] : alert [No Acute Distress] : no acute distress [Soft] : soft [Non-tender] : non-tender [Non-distended] : non-distended [Labia Majora] : normal [Labia Minora] : normal [Discharge] : a  ~M vaginal discharge was present [Clear] : clear [Thin] : thin [Scant] : There was scant vaginal bleeding [Normal] : normal [FreeTextEntry8] : wnl

## 2025-01-23 NOTE — PLAN
[FreeTextEntry1] : 39y.o  LMP  presenting for complaint of green vaginal discharge.   -Affirm collected -Urine sent  -Pt declining contraception currently -Pt being followed by PCP for orthostatic hypotension. Labs reviewed and wnl. VSS at today's visit. Pt to follow up with PCP for possible cardiac workup given continued experience of symptoms.   Cori Clancy, PGY-1 Discussed with Dr. Og

## 2025-01-23 NOTE — HISTORY OF PRESENT ILLNESS
[FreeTextEntry1] : 39y.o  LMP  presenting for complaint of green vaginal discharge. Pt seen multiple times for complaint in the past and reports improvement after treatment with medication after last visit. On chart review pt treated empirically with Diflucan in 2023. Pt reports intermittent green, foul smelling discharge. Not currently experiencing this discharge; Most recent episode last week. Denies urinary symptoms/ bleeding in between menses.   Pt with additional complaints of intermittent dizziness/ lightheadedness with standing. 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 Pt seen by PCP in December for issue.

## 2025-01-24 DIAGNOSIS — N76.0 ACUTE VAGINITIS: ICD-10-CM

## 2025-01-24 DIAGNOSIS — B37.31 ACUTE CANDIDIASIS OF VULVA AND VAGINA: ICD-10-CM

## 2025-01-24 DIAGNOSIS — B96.89 ACUTE VAGINITIS: ICD-10-CM

## 2025-01-24 RX ORDER — METRONIDAZOLE 7.5 MG/G
0.75 GEL VAGINAL
Qty: 1 | Refills: 0 | Status: ACTIVE | COMMUNITY
Start: 2025-01-24 | End: 1900-01-01

## 2025-01-24 RX ORDER — FLUCONAZOLE 150 MG/1
150 TABLET ORAL
Qty: 2 | Refills: 0 | Status: ACTIVE | COMMUNITY
Start: 2025-01-24 | End: 1900-01-01

## 2025-01-24 NOTE — ED ADULT NURSE NOTE - NS ED NURSE RECORD ANOTHER HT AND WT
Reviewed Diagnosis of: Principal Problem:    Bipolar 1 disorder (HCC)  Active Problems:    Asthma    Opioid use disorder, severe, dependence (HCC)    Tobacco use disorder    Polysubstance abuse (HCC)    Medical clearance for psychiatric admission    Reviewed Short Term Goals of: decrease in depressive symptoms, decrease in anxiety symptoms, decrease in suicidal thoughts, improvement in insight, sleep improvement, improvement in appetite, mood stabilization              01/24/25 0200   Team Meeting   Meeting Type Tx Team Meeting   Initial Conference Date 01/24/25   Next Conference Date 02/21/25   Team Members Present   Team Members Present Physician;Nurse;   Physician Team Member Dr. Gonzales   Nursing Team Member Ming   Care Management Team Member Andrade   Patient/Family Present   Patient Present No  (declined to participate)   Patient's Family Present No        Yes

## 2025-02-27 NOTE — ED ADULT NURSE NOTE - CHIEF COMPLAINT QUOTE
Real Secours Program blurry vision x3 mos

## 2025-04-07 ENCOUNTER — RX RENEWAL (OUTPATIENT)
Age: 40
End: 2025-04-07

## 2025-05-05 ENCOUNTER — RX RENEWAL (OUTPATIENT)
Age: 40
End: 2025-05-05

## 2025-05-19 ENCOUNTER — LABORATORY RESULT (OUTPATIENT)
Age: 40
End: 2025-05-19

## 2025-05-19 ENCOUNTER — OUTPATIENT (OUTPATIENT)
Dept: OUTPATIENT SERVICES | Facility: HOSPITAL | Age: 40
LOS: 1 days | End: 2025-05-19
Payer: SELF-PAY

## 2025-05-19 ENCOUNTER — APPOINTMENT (OUTPATIENT)
Dept: OBGYN | Facility: CLINIC | Age: 40
End: 2025-05-19
Payer: COMMERCIAL

## 2025-05-19 VITALS — DIASTOLIC BLOOD PRESSURE: 78 MMHG | WEIGHT: 198 LBS | BODY MASS INDEX: 36.21 KG/M2 | SYSTOLIC BLOOD PRESSURE: 118 MMHG

## 2025-05-19 DIAGNOSIS — R10.2 PELVIC AND PERINEAL PAIN: ICD-10-CM

## 2025-05-19 DIAGNOSIS — N89.8 OTHER SPECIFIED NONINFLAMMATORY DISORDERS OF VAGINA: ICD-10-CM

## 2025-05-19 DIAGNOSIS — O09.299 SUPERVISION OF PREGNANCY WITH OTHER POOR REPRODUCTIVE OR OBSTETRIC HISTORY, UNSPECIFIED TRIMESTER: ICD-10-CM

## 2025-05-19 DIAGNOSIS — N76.0 ACUTE VAGINITIS: ICD-10-CM

## 2025-05-19 DIAGNOSIS — Z01.419 ENCOUNTER FOR GYNECOLOGICAL EXAMINATION (GENERAL) (ROUTINE) W/OUT ABNORMAL FINDINGS: ICD-10-CM

## 2025-05-19 PROCEDURE — 87491 CHLMYD TRACH DNA AMP PROBE: CPT

## 2025-05-19 PROCEDURE — 87624 HPV HI-RISK TYP POOLED RSLT: CPT

## 2025-05-19 PROCEDURE — 81025 URINE PREGNANCY TEST: CPT

## 2025-05-19 PROCEDURE — 87591 N.GONORRHOEAE DNA AMP PROB: CPT

## 2025-05-19 PROCEDURE — T1013: CPT

## 2025-05-19 NOTE — HISTORY OF PRESENT ILLNESS
[Definite:  ___ (Date)] : the last menstrual period was [unfilled] [Normal Amount/Duration] : was of a normal amount and duration [Spotting Between  Menses] : no spotting between menses [Regular Cycle Intervals] : periods have been irregular [Sexually Active] : is sexually active [Monogamous] : is monogamous [Contraception] : does not use contraception

## 2025-05-19 NOTE — BEGINNING OF VISIT
[Patient] : patient [] :  [Language Line ] : provided by Language Line   [Time Spent: ____ minutes] : Total time spent using  services: [unfilled] minutes. The patient's primary language is not English thus required  services. [Interpreters_IDNumber] : number not recorded [Interpreters_FullName] : Osmar [TWNoteComboBox1] : Andorran

## 2025-05-20 LAB
C TRACH RRNA SPEC QL NAA+PROBE: SIGNIFICANT CHANGE UP
HPV HIGH+LOW RISK DNA PNL CVX: SIGNIFICANT CHANGE UP
N GONORRHOEA RRNA SPEC QL NAA+PROBE: SIGNIFICANT CHANGE UP
SPECIMEN SOURCE: SIGNIFICANT CHANGE UP

## 2025-05-21 ENCOUNTER — APPOINTMENT (OUTPATIENT)
Dept: OBGYN | Facility: CLINIC | Age: 40
End: 2025-05-21
Payer: COMMERCIAL

## 2025-05-21 ENCOUNTER — OUTPATIENT (OUTPATIENT)
Dept: OUTPATIENT SERVICES | Facility: HOSPITAL | Age: 40
LOS: 1 days | End: 2025-05-21
Payer: SELF-PAY

## 2025-05-21 VITALS — BODY MASS INDEX: 36.21 KG/M2 | DIASTOLIC BLOOD PRESSURE: 80 MMHG | WEIGHT: 198 LBS | SYSTOLIC BLOOD PRESSURE: 120 MMHG

## 2025-05-21 DIAGNOSIS — O20.0 THREATENED ABORTION: ICD-10-CM

## 2025-05-21 DIAGNOSIS — N76.0 ACUTE VAGINITIS: ICD-10-CM

## 2025-05-21 LAB — CYTOLOGY SPEC DOC CYTO: SIGNIFICANT CHANGE UP

## 2025-05-21 PROCEDURE — G0463: CPT

## 2025-05-21 PROCEDURE — 99213 OFFICE O/P EST LOW 20 MIN: CPT | Mod: 25

## 2025-05-21 NOTE — REASON FOR VISIT
[Follow-Up] : a follow-up evaluation of [Ad Hoc ] : provided by an ad hoc  [Interpreters_FullName] : Aracelis Yanez MA [TWNoteComboBox1] : Lao

## 2025-05-21 NOTE — PHYSICAL EXAM
[Chaperoned Physical Exam] : A chaperone was present in the examining room during all aspects of the physical examination. [MA] : MA [FreeTextEntry2] : Aracelis Yanez [Normal] : normal [Moderate] : There was moderate vaginal bleeding [Old Blood] : old blood was present in the vagina [Tenderness] : nontender [FreeTextEntry4] : old brown blood filling vault. [FreeTextEntry5] : appears long and closed on visual inspection.

## 2025-05-21 NOTE — PROCEDURE
[Transvaginal OB Sonogram] : Transvaginal OB Sonogram [Intrauterine Pregnancy] : intrauterine pregnancy [Fetal Heart] : fetal heart present [CRL: ___ (mm)] : CRL - [unfilled]Umm [Current GA by Sonogram: ___ (wks)] : Current GA by Sonogram: [unfilled]Uwks [___ day(s)] : [unfilled] days

## 2025-05-21 NOTE — DISCUSSION/SUMMARY
[FreeTextEntry1] : 38yo   @9.4 wks -with h/o cervical insuff  here for VB in early pregnancy- threatened ab - viable IUP  +FH,  CRL 2.7cm (9.4wks)- NOT consistent with LMP dating (7.5wks) - Blood type O pos- not rhogam candidate - pelvic rest until establishing care w/ [facility].  RT [facility] 1 wk-  appt scheduled SAB precautions DMaccourtney ,PAC

## 2025-05-22 ENCOUNTER — NON-APPOINTMENT (OUTPATIENT)
Age: 40
End: 2025-05-22

## 2025-05-27 ENCOUNTER — LABORATORY RESULT (OUTPATIENT)
Age: 40
End: 2025-05-27

## 2025-05-27 ENCOUNTER — ASOB RESULT (OUTPATIENT)
Age: 40
End: 2025-05-27

## 2025-05-27 ENCOUNTER — OUTPATIENT (OUTPATIENT)
Dept: OUTPATIENT SERVICES | Facility: HOSPITAL | Age: 40
LOS: 1 days | End: 2025-05-27
Payer: MEDICAID

## 2025-05-27 ENCOUNTER — APPOINTMENT (OUTPATIENT)
Dept: ANTEPARTUM | Facility: CLINIC | Age: 40
End: 2025-05-27
Payer: MEDICAID

## 2025-05-27 ENCOUNTER — APPOINTMENT (OUTPATIENT)
Dept: MATERNAL FETAL MEDICINE | Facility: CLINIC | Age: 40
End: 2025-05-27
Payer: MEDICAID

## 2025-05-27 ENCOUNTER — NON-APPOINTMENT (OUTPATIENT)
Age: 40
End: 2025-05-27

## 2025-05-27 VITALS
WEIGHT: 198 LBS | OXYGEN SATURATION: 99 % | HEART RATE: 73 BPM | HEIGHT: 62 IN | SYSTOLIC BLOOD PRESSURE: 90 MMHG | DIASTOLIC BLOOD PRESSURE: 73 MMHG | BODY MASS INDEX: 36.44 KG/M2

## 2025-05-27 DIAGNOSIS — O09.90 SUPERVISION OF HIGH RISK PREGNANCY, UNSPECIFIED, UNSPECIFIED TRIMESTER: ICD-10-CM

## 2025-05-27 DIAGNOSIS — O09.899 SUPERVISION OF OTHER HIGH RISK PREGNANCIES, UNSPECIFIED TRIMESTER: ICD-10-CM

## 2025-05-27 DIAGNOSIS — Z30.42 ENCOUNTER FOR SURVEILLANCE OF INJECTABLE CONTRACEPTIVE: ICD-10-CM

## 2025-05-27 DIAGNOSIS — Z34.91 ENCOUNTER FOR SUPERVISION OF NORMAL PREGNANCY, UNSPECIFIED, FIRST TRIMESTER: ICD-10-CM

## 2025-05-27 LAB
BILIRUB UR QL STRIP: NORMAL
GLUCOSE UR-MCNC: NORMAL
HCG UR QL: 0.2 EU/DL
HCT VFR BLD CALC: 41.5 % — SIGNIFICANT CHANGE UP (ref 34.5–45)
HGB BLD-MCNC: 13.6 G/DL — SIGNIFICANT CHANGE UP (ref 11.5–15.5)
HGB UR QL STRIP.AUTO: NORMAL
KETONES UR-MCNC: NORMAL
LEUKOCYTE ESTERASE UR QL STRIP: NORMAL
MCHC RBC-ENTMCNC: 32.8 G/DL — SIGNIFICANT CHANGE UP (ref 32–36)
MCHC RBC-ENTMCNC: 33.1 PG — SIGNIFICANT CHANGE UP (ref 27–34)
MCV RBC AUTO: 101 FL — HIGH (ref 80–100)
MEV IGG SER-ACNC: 18.9 AU/ML — SIGNIFICANT CHANGE UP
MEV IGG+IGM SER-IMP: POSITIVE — SIGNIFICANT CHANGE UP
NITRITE UR QL STRIP: NORMAL
PH UR STRIP: 6
PLATELET # BLD AUTO: 201 K/UL — SIGNIFICANT CHANGE UP (ref 150–400)
PROT UR STRIP-MCNC: NORMAL
RBC # BLD: 4.11 M/UL — SIGNIFICANT CHANGE UP (ref 3.8–5.2)
RBC # FLD: 12.8 % — SIGNIFICANT CHANGE UP (ref 10.3–14.5)
RUBV IGG SER-ACNC: 6.06 INDEX — SIGNIFICANT CHANGE UP
RUBV IGG SER-IMP: POSITIVE — SIGNIFICANT CHANGE UP
SP GR UR STRIP: 1.02
T PALLIDUM AB TITR SER: NEGATIVE — SIGNIFICANT CHANGE UP
TSH SERPL-MCNC: 2.47 UIU/ML — SIGNIFICANT CHANGE UP (ref 0.27–4.2)
VZV IGG FLD QL IA: 0.02 S/CO — SIGNIFICANT CHANGE UP
VZV IGG FLD QL IA: NEGATIVE — SIGNIFICANT CHANGE UP
WBC # BLD: 7.45 K/UL — SIGNIFICANT CHANGE UP (ref 3.8–10.5)
WBC # FLD AUTO: 7.45 K/UL — SIGNIFICANT CHANGE UP (ref 3.8–10.5)

## 2025-05-27 PROCEDURE — 83655 ASSAY OF LEAD: CPT

## 2025-05-27 PROCEDURE — 76801 OB US < 14 WKS SINGLE FETUS: CPT | Mod: 26

## 2025-05-27 PROCEDURE — 87086 URINE CULTURE/COLONY COUNT: CPT

## 2025-05-27 PROCEDURE — 86803 HEPATITIS C AB TEST: CPT

## 2025-05-27 PROCEDURE — 99203 OFFICE O/P NEW LOW 30 MIN: CPT | Mod: GC,25

## 2025-05-27 PROCEDURE — 85027 COMPLETE CBC AUTOMATED: CPT

## 2025-05-27 PROCEDURE — 84443 ASSAY THYROID STIM HORMONE: CPT

## 2025-05-27 PROCEDURE — 86850 RBC ANTIBODY SCREEN: CPT

## 2025-05-27 PROCEDURE — 36415 COLL VENOUS BLD VENIPUNCTURE: CPT

## 2025-05-27 PROCEDURE — 86765 RUBEOLA ANTIBODY: CPT

## 2025-05-27 PROCEDURE — 86901 BLOOD TYPING SEROLOGIC RH(D): CPT

## 2025-05-27 PROCEDURE — 86780 TREPONEMA PALLIDUM: CPT

## 2025-05-27 PROCEDURE — 76801 OB US < 14 WKS SINGLE FETUS: CPT

## 2025-05-27 PROCEDURE — 81420 FETAL CHRMOML ANEUPLOIDY: CPT

## 2025-05-27 PROCEDURE — 87340 HEPATITIS B SURFACE AG IA: CPT

## 2025-05-27 PROCEDURE — 81422 FETAL CHRMOML MICRODELTJ: CPT

## 2025-05-27 PROCEDURE — 87389 HIV-1 AG W/HIV-1&-2 AB AG IA: CPT

## 2025-05-27 PROCEDURE — G0463: CPT

## 2025-05-27 PROCEDURE — 86706 HEP B SURFACE ANTIBODY: CPT

## 2025-05-27 PROCEDURE — 86762 RUBELLA ANTIBODY: CPT

## 2025-05-27 PROCEDURE — 76817 TRANSVAGINAL US OBSTETRIC: CPT | Mod: 26

## 2025-05-27 PROCEDURE — 76817 TRANSVAGINAL US OBSTETRIC: CPT

## 2025-05-27 PROCEDURE — 86900 BLOOD TYPING SEROLOGIC ABO: CPT

## 2025-05-27 PROCEDURE — 86480 TB TEST CELL IMMUN MEASURE: CPT

## 2025-05-27 PROCEDURE — 86787 VARICELLA-ZOSTER ANTIBODY: CPT

## 2025-05-28 ENCOUNTER — NON-APPOINTMENT (OUTPATIENT)
Age: 40
End: 2025-05-28

## 2025-05-28 LAB
BLD GP AB SCN SERPL QL: SIGNIFICANT CHANGE UP
CULTURE RESULTS: SIGNIFICANT CHANGE UP
HBV SURFACE AB SER-ACNC: REACTIVE — SIGNIFICANT CHANGE UP
HBV SURFACE AG SER-ACNC: SIGNIFICANT CHANGE UP
HCV AB S/CO SERPL IA: 0.1 S/CO — SIGNIFICANT CHANGE UP (ref 0–0.79)
HCV AB SERPL-IMP: SIGNIFICANT CHANGE UP
HIV 1+2 AB+HIV1 P24 AG SERPL QL IA: SIGNIFICANT CHANGE UP
LEAD BLD-MCNC: <1 UG/DL — SIGNIFICANT CHANGE UP (ref 0–3.4)
SPECIMEN SOURCE: SIGNIFICANT CHANGE UP

## 2025-05-29 DIAGNOSIS — O34.211 MATERNAL CARE FOR LOW TRANSVERSE SCAR FROM PREVIOUS CESAREAN DELIVERY: ICD-10-CM

## 2025-05-29 DIAGNOSIS — Z34.91 ENCOUNTER FOR SUPERVISION OF NORMAL PREGNANCY, UNSPECIFIED, FIRST TRIMESTER: ICD-10-CM

## 2025-05-29 DIAGNOSIS — O09.299 SUPERVISION OF PREGNANCY WITH OTHER POOR REPRODUCTIVE OR OBSTETRIC HISTORY, UNSPECIFIED TRIMESTER: ICD-10-CM

## 2025-05-29 DIAGNOSIS — O99.211 OBESITY COMPLICATING PREGNANCY, FIRST TRIMESTER: ICD-10-CM

## 2025-05-29 DIAGNOSIS — O09.291 SUPERVISION OF PREGNANCY WITH OTHER POOR REPRODUCTIVE OR OBSTETRIC HISTORY, FIRST TRIMESTER: ICD-10-CM

## 2025-05-29 DIAGNOSIS — Z3A.10 10 WEEKS GESTATION OF PREGNANCY: ICD-10-CM

## 2025-05-29 LAB
GAMMA INTERFERON BACKGROUND BLD IA-ACNC: 0.08 IU/ML — SIGNIFICANT CHANGE UP
M TB IFN-G BLD-IMP: NEGATIVE — SIGNIFICANT CHANGE UP
M TB IFN-G CD4+ BCKGRND COR BLD-ACNC: 0 IU/ML — SIGNIFICANT CHANGE UP
M TB IFN-G CD4+CD8+ BCKGRND COR BLD-ACNC: 0 IU/ML — SIGNIFICANT CHANGE UP
QUANT TB PLUS MITOGEN MINUS NIL: >10 IU/ML — SIGNIFICANT CHANGE UP

## 2025-06-01 LAB
11Q23 DELETION (JACOBSEN): SIGNIFICANT CHANGE UP
15Q11 DELETION (PW ANGELMAN): SIGNIFICANT CHANGE UP
1P36 DELETION SYNDROME: SIGNIFICANT CHANGE UP
22Q11 DELETION (DIGEORGE): SIGNIFICANT CHANGE UP
4P16 DELETION(WOLF-HIRSCHHORN): SIGNIFICANT CHANGE UP
5P15 DELETION (CRI-DU-CHAT): SIGNIFICANT CHANGE UP
8Q24 DELETION (LANGER-GIEDION): SIGNIFICANT CHANGE UP
ABOUT THE TEST: SIGNIFICANT CHANGE UP
APPROVED BY: SIGNIFICANT CHANGE UP
FETAL SEX: SIGNIFICANT CHANGE UP
LAB DIRECTOR COMMENTS: SIGNIFICANT CHANGE UP
Lab: NEGATIVE — SIGNIFICANT CHANGE UP
Lab: SIGNIFICANT CHANGE UP
MONOSOMY X (TURNER SYNDROME): SIGNIFICANT CHANGE UP
REFERENCES: SIGNIFICANT CHANGE UP
TRISOMY 13 (PATAU SYNDROME): NEGATIVE — SIGNIFICANT CHANGE UP
TRISOMY 16: SIGNIFICANT CHANGE UP
TRISOMY 18 (EDWARDS SYNDROME): NEGATIVE — SIGNIFICANT CHANGE UP
TRISOMY 21 (DOWN SYNDROME): NEGATIVE — SIGNIFICANT CHANGE UP
TRISOMY 22: SIGNIFICANT CHANGE UP
XXX (TRIPLE X SYNDROME): SIGNIFICANT CHANGE UP
XXY (KLINEFELTER SYNDROME): SIGNIFICANT CHANGE UP
XYY (JACOBS SYNDROME): SIGNIFICANT CHANGE UP

## 2025-06-02 DIAGNOSIS — O20.0 THREATENED ABORTION: ICD-10-CM

## 2025-06-04 ENCOUNTER — NON-APPOINTMENT (OUTPATIENT)
Age: 40
End: 2025-06-04

## 2025-06-04 ENCOUNTER — APPOINTMENT (OUTPATIENT)
Dept: MATERNAL FETAL MEDICINE | Facility: CLINIC | Age: 40
End: 2025-06-04

## 2025-06-04 ENCOUNTER — ASOB RESULT (OUTPATIENT)
Age: 40
End: 2025-06-04

## 2025-06-04 PROCEDURE — 99214 OFFICE O/P EST MOD 30 MIN: CPT | Mod: 95

## 2025-06-10 ENCOUNTER — APPOINTMENT (OUTPATIENT)
Dept: MATERNAL FETAL MEDICINE | Facility: CLINIC | Age: 40
End: 2025-06-10

## 2025-06-10 ENCOUNTER — ASOB RESULT (OUTPATIENT)
Age: 40
End: 2025-06-10

## 2025-06-10 ENCOUNTER — OUTPATIENT (OUTPATIENT)
Dept: OUTPATIENT SERVICES | Facility: HOSPITAL | Age: 40
LOS: 1 days | End: 2025-06-10
Payer: MEDICAID

## 2025-06-10 ENCOUNTER — APPOINTMENT (OUTPATIENT)
Dept: ANTEPARTUM | Facility: CLINIC | Age: 40
End: 2025-06-10
Payer: MEDICAID

## 2025-06-10 VITALS
OXYGEN SATURATION: 99 % | SYSTOLIC BLOOD PRESSURE: 100 MMHG | DIASTOLIC BLOOD PRESSURE: 62 MMHG | BODY MASS INDEX: 36.16 KG/M2 | HEIGHT: 62 IN | HEART RATE: 74 BPM | WEIGHT: 196.5 LBS

## 2025-06-10 DIAGNOSIS — Z86.19 PERSONAL HISTORY OF OTHER INFECTIOUS AND PARASITIC DISEASES: ICD-10-CM

## 2025-06-10 DIAGNOSIS — O26.899 OTHER SPECIFIED PREGNANCY RELATED CONDITIONS, UNSPECIFIED TRIMESTER: ICD-10-CM

## 2025-06-10 DIAGNOSIS — O09.90 SUPERVISION OF HIGH RISK PREGNANCY, UNSPECIFIED, UNSPECIFIED TRIMESTER: ICD-10-CM

## 2025-06-10 DIAGNOSIS — O09.899 SUPERVISION OF OTHER HIGH RISK PREGNANCIES, UNSPECIFIED TRIMESTER: ICD-10-CM

## 2025-06-10 DIAGNOSIS — E66.811 OBESITY, CLASS 1: ICD-10-CM

## 2025-06-10 LAB
BILIRUB UR QL STRIP: NORMAL
GLUCOSE UR-MCNC: NORMAL
HCG UR QL: 1 EU/DL
HGB UR QL STRIP.AUTO: NORMAL
KETONES UR-MCNC: NORMAL
LEUKOCYTE ESTERASE UR QL STRIP: NORMAL
NITRITE UR QL STRIP: NORMAL
PH UR STRIP: 6.5
PROT UR STRIP-MCNC: NORMAL
SP GR UR STRIP: 1.01

## 2025-06-10 PROCEDURE — 76801 OB US < 14 WKS SINGLE FETUS: CPT | Mod: 26

## 2025-06-10 PROCEDURE — 76801 OB US < 14 WKS SINGLE FETUS: CPT

## 2025-06-10 PROCEDURE — 76813 OB US NUCHAL MEAS 1 GEST: CPT | Mod: 26

## 2025-06-10 PROCEDURE — 76813 OB US NUCHAL MEAS 1 GEST: CPT

## 2025-06-10 PROCEDURE — 99213 OFFICE O/P EST LOW 20 MIN: CPT | Mod: GC,25

## 2025-06-10 PROCEDURE — G0463: CPT

## 2025-06-10 PROCEDURE — 81003 URINALYSIS AUTO W/O SCOPE: CPT

## 2025-06-10 RX ORDER — ASPIRIN 81 MG/1
81 TABLET, CHEWABLE ORAL
Qty: 90 | Refills: 2 | Status: ACTIVE | COMMUNITY
Start: 2025-06-10 | End: 1900-01-01

## 2025-06-11 DIAGNOSIS — O34.211 MATERNAL CARE FOR LOW TRANSVERSE SCAR FROM PREVIOUS CESAREAN DELIVERY: ICD-10-CM

## 2025-06-11 DIAGNOSIS — O36.80X0 PREGNANCY WITH INCONCLUSIVE FETAL VIABILITY, NOT APPLICABLE OR UNSPECIFIED: ICD-10-CM

## 2025-06-11 DIAGNOSIS — Z3A.12 12 WEEKS GESTATION OF PREGNANCY: ICD-10-CM

## 2025-06-11 DIAGNOSIS — O99.211 OBESITY COMPLICATING PREGNANCY, FIRST TRIMESTER: ICD-10-CM

## 2025-06-17 ENCOUNTER — TRANSCRIPTION ENCOUNTER (OUTPATIENT)
Age: 40
End: 2025-06-17

## 2025-06-17 ENCOUNTER — OUTPATIENT (OUTPATIENT)
Dept: OUTPATIENT SERVICES | Facility: HOSPITAL | Age: 40
LOS: 1 days | End: 2025-06-17
Payer: MEDICAID

## 2025-06-17 ENCOUNTER — APPOINTMENT (OUTPATIENT)
Dept: ANTEPARTUM | Facility: HOSPITAL | Age: 40
End: 2025-06-17

## 2025-06-17 VITALS — OXYGEN SATURATION: 100 % | HEART RATE: 74 BPM

## 2025-06-17 VITALS
RESPIRATION RATE: 18 BRPM | DIASTOLIC BLOOD PRESSURE: 59 MMHG | HEART RATE: 66 BPM | SYSTOLIC BLOOD PRESSURE: 98 MMHG | TEMPERATURE: 98 F

## 2025-06-17 DIAGNOSIS — O09.291 SUPERVISION OF PREGNANCY WITH OTHER POOR REPRODUCTIVE OR OBSTETRIC HISTORY, FIRST TRIMESTER: ICD-10-CM

## 2025-06-17 DIAGNOSIS — Z98.891 HISTORY OF UTERINE SCAR FROM PREVIOUS SURGERY: Chronic | ICD-10-CM

## 2025-06-17 LAB
HCT VFR BLD CALC: 40.1 % — SIGNIFICANT CHANGE UP (ref 34.5–45)
HGB BLD-MCNC: 13.8 G/DL — SIGNIFICANT CHANGE UP (ref 11.5–15.5)
MCHC RBC-ENTMCNC: 32.9 PG — SIGNIFICANT CHANGE UP (ref 27–34)
MCHC RBC-ENTMCNC: 34.4 G/DL — SIGNIFICANT CHANGE UP (ref 32–36)
MCV RBC AUTO: 95.7 FL — SIGNIFICANT CHANGE UP (ref 80–100)
NRBC BLD AUTO-RTO: 0 /100 WBCS — SIGNIFICANT CHANGE UP (ref 0–0)
PLATELET # BLD AUTO: 174 K/UL — SIGNIFICANT CHANGE UP (ref 150–400)
RBC # BLD: 4.19 M/UL — SIGNIFICANT CHANGE UP (ref 3.8–5.2)
RBC # FLD: 12.6 % — SIGNIFICANT CHANGE UP (ref 10.3–14.5)
WBC # BLD: 7.89 K/UL — SIGNIFICANT CHANGE UP (ref 3.8–10.5)
WBC # FLD AUTO: 7.89 K/UL — SIGNIFICANT CHANGE UP (ref 3.8–10.5)

## 2025-06-17 PROCEDURE — 36415 COLL VENOUS BLD VENIPUNCTURE: CPT

## 2025-06-17 PROCEDURE — 85027 COMPLETE CBC AUTOMATED: CPT

## 2025-06-17 PROCEDURE — 86901 BLOOD TYPING SEROLOGIC RH(D): CPT

## 2025-06-17 PROCEDURE — 86900 BLOOD TYPING SEROLOGIC ABO: CPT

## 2025-06-17 PROCEDURE — 59320 REVISION OF CERVIX: CPT

## 2025-06-17 PROCEDURE — 86850 RBC ANTIBODY SCREEN: CPT

## 2025-06-17 RX ORDER — CITRIC ACID/SODIUM CITRATE 300-500 MG
30 SOLUTION, ORAL ORAL ONCE
Refills: 0 | Status: COMPLETED | OUTPATIENT
Start: 2025-06-17 | End: 2025-06-17

## 2025-06-17 RX ORDER — INDOMETHACIN 50 MG
1 CAPSULE ORAL
Qty: 8 | Refills: 0
Start: 2025-06-17 | End: 2025-06-18

## 2025-06-17 RX ORDER — SODIUM CHLORIDE 9 G/1000ML
1000 INJECTION, SOLUTION INTRAVENOUS
Refills: 0 | Status: DISCONTINUED | OUTPATIENT
Start: 2025-06-17 | End: 2025-06-20

## 2025-06-17 RX ORDER — INDOMETHACIN 50 MG
25 CAPSULE ORAL ONCE
Refills: 0 | Status: DISCONTINUED | OUTPATIENT
Start: 2025-06-17 | End: 2025-07-01

## 2025-06-17 RX ORDER — INDOMETHACIN 50 MG
50 CAPSULE ORAL ONCE
Refills: 0 | Status: COMPLETED | OUTPATIENT
Start: 2025-06-17 | End: 2025-06-17

## 2025-06-17 RX ADMIN — Medication 20 MILLIGRAM(S): at 12:51

## 2025-06-17 RX ADMIN — Medication 50 MILLIGRAM(S): at 15:10

## 2025-06-17 RX ADMIN — Medication 30 MILLILITER(S): at 12:51

## 2025-06-17 NOTE — DISCHARGE NOTE ANTEPARTUM - ADDITIONAL INSTRUCTIONS
You have been sent a prescription for Indocin 25mg to be taken every 8 hours for the next 1.5 days.  Follow up with OB as previously arranged.   Nothing in vagina, no intercourse, no douching, no tampons, no tub baths, and no swimming until cleared by M.D.  Return to hospital immediately if increased vaginal bleeding, leaking fluid , uterine contractions or decreased FM.  Please report any signs and symptoms of infection including Fever (Temp >101 or >100.4), uncontrollable nausea, vomiting, diarrhea, chills & inability to urinate.  Continue with present meds.

## 2025-06-17 NOTE — OB PROVIDER H&P - ATTENDING COMMENTS
MFM Attending    Language Line # [phone] (Croatian)     at 13 3/7 weeks who presents for history indicated cerclage  G1-short cervix at 17 weeks-serial cervical surveillance- induction of labor at 23 weeks  G2- (declined history indicated cerclage) serial cervical surveillance- cervical shortening at 20 weeks- ultrasound indicated cerclage placed- PPROM/PTB at 34 weeks  Patient known to me from [facility] ,risks/benefits/alternatives discussed. Patient reaffirms wish for history indicated cerlcage.  Informed consent obtained.    Yenny Ford MD, MPH

## 2025-06-17 NOTE — DISCHARGE NOTE ANTEPARTUM - HOSPITAL COURSE
@13w3d admitted for history indicated cerclage placement. Cerclage placed without difficulty. Pt voided and was discharged in stable condition.

## 2025-06-17 NOTE — DISCHARGE NOTE ANTEPARTUM - FINANCIAL ASSISTANCE
[facility] provides services at a reduced cost to those who are determined to be eligible through [facility]’s financial assistance program. Information regarding [facility]’s financial assistance program can be found by going to [email or web address] or by calling [phone].

## 2025-06-17 NOTE — DISCHARGE NOTE ANTEPARTUM - CARE PROVIDER_API CALL
[facility],   Call the clinic to make an appointment   [address]   [address]  [facility], [address]  [phone]  Phone: [phone]  Fax: (   )    -  Follow Up Time:

## 2025-06-17 NOTE — DISCHARGE NOTE ANTEPARTUM - CARE PLAN
Principal Discharge DX:	History of prior pregnancy with short cervix, currently pregnant  Assessment and plan of treatment:	s/p cerclarge  uncomplicated   1

## 2025-06-17 NOTE — DISCHARGE NOTE ANTEPARTUM - MEDICATION SUMMARY - MEDICATIONS TO TAKE
I will START or STAY ON the medications listed below when I get home from the hospital:    indomethacin 25 mg oral capsule  -- 1 cap(s) by mouth every 6 hours  -- Indication: For cerclarge    acetaminophen 325 mg oral tablet  -- 2 tab(s) by mouth every 6 hours, As needed, Temp greater or equal to 38C (100.4F)  -- Indication: For pain    meclizine 25 mg oral tablet  -- 1 tab(s) by mouth every 6 hours as needed for dizziness  -- May cause drowsiness.  Alcohol may intensify this effect.  Use care when operating dangerous machinery.    -- Indication: For Home med    ondansetron 4 mg oral tablet, disintegrating  -- 1 tab(s) by mouth 2 times a day   -- Indication: For Home med    Pepcid 20 mg oral tablet  -- 1 tab(s) by mouth once a day   -- It is very important that you take or use this exactly as directed.  Do not skip doses or discontinue unless directed by your doctor.  Obtain medical advice before taking any non-prescription drugs as some may affect the action of this medication.    -- Indication: For Home med

## 2025-06-17 NOTE — DISCHARGE NOTE ANTEPARTUM - PROVIDER TOKENS
FREE:[LAST:[[facility]],PHONE:[phone]],FAX:[(   )    -],ADDRESS:[Call the clinic to make an appointment   [address] [place] [address] [address]  [facility], [address]  [phone]]]

## 2025-06-17 NOTE — OB RN INTRAOPERATIVE NOTE - NSOBSELHIDDEN_OBGYN_ALL_OB_FT
[NSOBAttendingProcedure1_OBGYN_ALL_OB_FT:[id number]=],[NSRNCirculatorProcedure1_OBGYN_ALL_OB_FT:[id number]==],[NSRNCirculator2Procedure1_OBGYN_ALL_OB_FT:[id number]=]

## 2025-06-17 NOTE — OB PROVIDER H&P - ASSESSMENT
39yF  @ 13w3d, presents to L&D for history indicated cerclage.      Plan:  - LR Bolus x 1L, LR@125cc/hr for maintenance.  - Anesthesia consult  - Routine labs  - NPO except meds/ice chips.      Viktoria Willard PGY2  Plan per Dr. Ford

## 2025-06-17 NOTE — OB RN INTRAOPERATIVE NOTE - NS_ADDITIONALPROCINFO2_OBGYN_ALL_OB_FT
Urine= 100ml of yellow urine, straight cath prior to start of procedure. Urine= 100ml of yellow urine, straight cath prior to start of procedure.  EBL=5ml as per MD.

## 2025-06-17 NOTE — BRIEF OPERATIVE NOTE - COMMENTS
Phone  # [phone] (Joe Rodgers) Togolese Phone  # [phone] (Joe Rodgers) Welsh Phone  # [phone] (Joe Rodgers)  Dictation #  [id number]

## 2025-06-17 NOTE — BRIEF OPERATIVE NOTE - OPERATION/FINDINGS
Using 5-0 Prolene suture, stitch was placed immediately placed below the level of the bladder reflection with the knot at 12 o'clock position.    Pt. tolerated procedure well. +FH (144bpm) on postop sono   Cervix appeared closed. Using Prolene suture, stitch was placed immediately below the level of the bladder reflection with the knot at 12 o'clock position.    Pt. tolerated procedure well. +FH (144bpm) on postop sono.

## 2025-06-17 NOTE — DISCHARGE NOTE ANTEPARTUM - PATIENT PORTAL LINK FT
You can access the FollowMyHealth Patient Portal offered by [facility] by registering at the following website: [email or web address]. By joining [facility]’s FollowMyHealth portal, you will also be able to view your health information using other applications (apps) compatible with our system.

## 2025-06-17 NOTE — BRIEF OPERATIVE NOTE - NSICDXBRIEFPREOP_GEN_ALL_CORE_FT
PRE-OP DIAGNOSIS:  History of prior pregnancy with short cervix, currently pregnant 17-Jun-2025 14:42:28  Viktoria Willard   PRE-OP DIAGNOSIS:  13 weeks gestation of pregnancy 2025 18:18:10  Sheela Mcclure  History of cervical cerclage 2025 18:18:36  Sheela Mcclure  History of  delivery 2025 18:18:45  Sheela Mcclure

## 2025-06-17 NOTE — OB RN TRIAGE NOTE - INTERNATIONAL TRAVEL
Corine care performed while pt in bed. Gown changed, pad changed, binder applied, and ice reapplied to incisional area. fair balance No

## 2025-06-17 NOTE — OB PROVIDER H&P - HISTORY OF PRESENT ILLNESS
39yF  @ 13w3d, presents to L&D for history indicated cerclage. Patient denies vaginal bleeding, contractions and leakage of fluid.  Denies fever, chills, headaches, changes in vision, chest pain, palpitations, SOB, cough, nausea, vomiting, diarrhea, constipation, urinary symptoms, edema.      OBhx:    c/b shortened cervix at 17wks. Pt had rescue cerclage placed with f/u showing exposedmembranes past the stich. Pt had IOL and  at 23wks-  - 2018, pt was counseled on history indicated cerclage vs serial CL measurements and pt opted for serial CLs.  At20wk ultrasound cervix was dilated, and fluid was noted in the vagina. Pt was sent to L&D and had amnioreduction to r/oinfection and cerclage was placed.  Pt had PPROM at 34wks c/b dilated cervix at 20wks with cerclarge placed at the time s/p pLTCS for and was delivered via C/S due to breech presentation @ 34wks.-  GYNhx: denies  PMH: hx of migraines, previous on nortriptyline has not taken since   PSH: C/Sx1  Meds: PNV, bASA  All: NKDA  Social Hx: Denies alcohol, tobacco, drug use    PHYSICAL EXAM  Vital Signs Last 24 Hrs  T(C): --  T(F): --  HR: 67 (2025 11:43) (59 - 229)  BP: 98/59 (2025 10:17) (98/59 - 98/59)  BP(mean): --  RR: --  SpO2: 100% (2025 11:43) (82% - 100%)    Parameters below as of 2025 10:32  Patient On (Oxygen Delivery Method): room air        Gen: NAD  Head: NC/AT  Cardio: RRR  Abdomen: Soft, NT/ND    Bedside sono FHR: 148-152bpm

## 2025-06-17 NOTE — BRIEF OPERATIVE NOTE - NSICDXBRIEFPOSTOP_GEN_ALL_CORE_FT
POST-OP DIAGNOSIS:  History of prior pregnancy with short cervix, currently pregnant 17-Jun-2025 14:42:32  Viktoria Willard   POST-OP DIAGNOSIS:  13 weeks gestation of pregnancy 2025 18:18:53  Sheela Mcclure  History of cervical cerclage 2025 18:18:59  Sheela Mcclure  History of  delivery 2025 18:19:05  Sheela Mcclure

## 2025-06-18 PROBLEM — Z33.2 ENCOUNTER FOR ELECTIVE TERMINATION OF PREGNANCY: Chronic | Status: ACTIVE | Noted: 2025-06-17

## 2025-06-24 ENCOUNTER — APPOINTMENT (OUTPATIENT)
Dept: ANTEPARTUM | Facility: CLINIC | Age: 40
End: 2025-06-24
Payer: MEDICAID

## 2025-06-24 ENCOUNTER — APPOINTMENT (OUTPATIENT)
Dept: MATERNAL FETAL MEDICINE | Facility: CLINIC | Age: 40
End: 2025-06-24
Payer: MEDICAID

## 2025-06-24 ENCOUNTER — ASOB RESULT (OUTPATIENT)
Age: 40
End: 2025-06-24

## 2025-06-24 ENCOUNTER — OUTPATIENT (OUTPATIENT)
Dept: OUTPATIENT SERVICES | Facility: HOSPITAL | Age: 40
LOS: 1 days | End: 2025-06-24
Payer: MEDICAID

## 2025-06-24 VITALS
SYSTOLIC BLOOD PRESSURE: 98 MMHG | BODY MASS INDEX: 35.73 KG/M2 | WEIGHT: 195.38 LBS | OXYGEN SATURATION: 98 % | DIASTOLIC BLOOD PRESSURE: 65 MMHG | HEART RATE: 76 BPM

## 2025-06-24 DIAGNOSIS — O09.899 SUPERVISION OF OTHER HIGH RISK PREGNANCIES, UNSPECIFIED TRIMESTER: ICD-10-CM

## 2025-06-24 LAB
BILIRUB UR QL STRIP: NORMAL
GLUCOSE UR-MCNC: NORMAL
HCG UR QL: 0.2 EU/DL
HGB UR QL STRIP.AUTO: NORMAL
KETONES UR-MCNC: 40
LEUKOCYTE ESTERASE UR QL STRIP: NORMAL
NITRITE UR QL STRIP: NORMAL
PH UR STRIP: 5.5
PROT UR STRIP-MCNC: 30
SP GR UR STRIP: 1.02

## 2025-06-24 PROCEDURE — 99214 OFFICE O/P EST MOD 30 MIN: CPT

## 2025-06-24 PROCEDURE — 76815 OB US LIMITED FETUS(S): CPT | Mod: 26

## 2025-06-24 PROCEDURE — 76817 TRANSVAGINAL US OBSTETRIC: CPT

## 2025-06-24 PROCEDURE — 76815 OB US LIMITED FETUS(S): CPT

## 2025-06-24 PROCEDURE — 87086 URINE CULTURE/COLONY COUNT: CPT

## 2025-06-24 PROCEDURE — 76817 TRANSVAGINAL US OBSTETRIC: CPT | Mod: 26

## 2025-06-25 ENCOUNTER — LABORATORY RESULT (OUTPATIENT)
Age: 40
End: 2025-06-25

## 2025-06-25 DIAGNOSIS — O99.212 OBESITY COMPLICATING PREGNANCY, SECOND TRIMESTER: ICD-10-CM

## 2025-06-25 DIAGNOSIS — Z3A.14 14 WEEKS GESTATION OF PREGNANCY: ICD-10-CM

## 2025-06-25 DIAGNOSIS — O09.291 SUPERVISION OF PREGNANCY WITH OTHER POOR REPRODUCTIVE OR OBSTETRIC HISTORY, FIRST TRIMESTER: ICD-10-CM

## 2025-06-26 LAB
CULTURE RESULTS: NO GROWTH — SIGNIFICANT CHANGE UP
SPECIMEN SOURCE: SIGNIFICANT CHANGE UP

## 2025-06-27 ENCOUNTER — NON-APPOINTMENT (OUTPATIENT)
Age: 40
End: 2025-06-27

## 2025-07-08 ENCOUNTER — APPOINTMENT (OUTPATIENT)
Dept: MATERNAL FETAL MEDICINE | Facility: CLINIC | Age: 40
End: 2025-07-08
Payer: MEDICAID

## 2025-07-08 ENCOUNTER — APPOINTMENT (OUTPATIENT)
Dept: ANTEPARTUM | Facility: CLINIC | Age: 40
End: 2025-07-08

## 2025-07-08 ENCOUNTER — OUTPATIENT (OUTPATIENT)
Dept: OUTPATIENT SERVICES | Facility: HOSPITAL | Age: 40
LOS: 1 days | End: 2025-07-08
Payer: COMMERCIAL

## 2025-07-08 ENCOUNTER — ASOB RESULT (OUTPATIENT)
Age: 40
End: 2025-07-08

## 2025-07-08 ENCOUNTER — APPOINTMENT (OUTPATIENT)
Dept: MATERNAL FETAL MEDICINE | Facility: CLINIC | Age: 40
End: 2025-07-08

## 2025-07-08 ENCOUNTER — LABORATORY RESULT (OUTPATIENT)
Age: 40
End: 2025-07-08

## 2025-07-08 ENCOUNTER — APPOINTMENT (OUTPATIENT)
Dept: ANTEPARTUM | Facility: CLINIC | Age: 40
End: 2025-07-08
Payer: MEDICAID

## 2025-07-08 VITALS
HEART RATE: 71 BPM | DIASTOLIC BLOOD PRESSURE: 62 MMHG | WEIGHT: 196.5 LBS | SYSTOLIC BLOOD PRESSURE: 93 MMHG | HEIGHT: 62 IN | BODY MASS INDEX: 36.16 KG/M2 | OXYGEN SATURATION: 98 %

## 2025-07-08 DIAGNOSIS — O09.899 SUPERVISION OF OTHER HIGH RISK PREGNANCIES, UNSPECIFIED TRIMESTER: ICD-10-CM

## 2025-07-08 DIAGNOSIS — Z98.891 HISTORY OF UTERINE SCAR FROM PREVIOUS SURGERY: Chronic | ICD-10-CM

## 2025-07-08 PROBLEM — Z86.19 HISTORY OF SEPTIC SHOCK: Status: ACTIVE | Noted: 2025-06-10

## 2025-07-08 PROBLEM — O26.899 HEADACHE IN PREGNANCY: Status: ACTIVE | Noted: 2025-06-10

## 2025-07-08 PROBLEM — O09.299 HISTORY OF CERVICAL INCOMPETENCE IN PREGNANCY, CURRENTLY PREGNANT: Status: ACTIVE | Noted: 2025-06-10

## 2025-07-08 LAB
BILIRUB UR QL STRIP: NORMAL
GLUCOSE UR-MCNC: NORMAL
HCG UR QL: 0.2 EU/DL
HGB UR QL STRIP.AUTO: NORMAL
KETONES UR-MCNC: NORMAL
LEUKOCYTE ESTERASE UR QL STRIP: NORMAL
NITRITE UR QL STRIP: NORMAL
PH UR STRIP: 5.5
PROT UR STRIP-MCNC: NORMAL
SP GR UR STRIP: 1.03

## 2025-07-08 PROCEDURE — 87086 URINE CULTURE/COLONY COUNT: CPT

## 2025-07-08 PROCEDURE — 76817 TRANSVAGINAL US OBSTETRIC: CPT | Mod: 26

## 2025-07-08 PROCEDURE — G0463: CPT

## 2025-07-08 PROCEDURE — 76805 OB US >/= 14 WKS SNGL FETUS: CPT | Mod: 26

## 2025-07-08 PROCEDURE — 36415 COLL VENOUS BLD VENIPUNCTURE: CPT

## 2025-07-08 PROCEDURE — 99213 OFFICE O/P EST LOW 20 MIN: CPT | Mod: GC,25

## 2025-07-08 PROCEDURE — 82105 ALPHA-FETOPROTEIN SERUM: CPT

## 2025-07-08 PROCEDURE — 76817 TRANSVAGINAL US OBSTETRIC: CPT

## 2025-07-08 PROCEDURE — 76805 OB US >/= 14 WKS SNGL FETUS: CPT

## 2025-07-08 PROCEDURE — 81003 URINALYSIS AUTO W/O SCOPE: CPT

## 2025-07-09 LAB
AF-AFP MOM: 0.97 — SIGNIFICANT CHANGE UP
AFP CONCENTRATION: 28.25 NG/ML — SIGNIFICANT CHANGE UP
AFP INTERPRETATION: SIGNIFICANT CHANGE UP
AFP MOM CUT-OFF: 2.5 — SIGNIFICANT CHANGE UP
AFP PERCENTILE: 46.6 — SIGNIFICANT CHANGE UP
AFP SCREENING RESULT: SIGNIFICANT CHANGE UP
AFTER SCREENING RISK OPEN SPINA BIFIDA: SIGNIFICANT CHANGE UP
BEFORE SCREENING RISK OPEN SPINA BIFIDA: SIGNIFICANT CHANGE UP
EXTREME ANALYTE ALERT: NO — SIGNIFICANT CHANGE UP
GEN TEST INFORMATION: SIGNIFICANT CHANGE UP
MS GESTATIONAL AGE: SIGNIFICANT CHANGE UP
ORDER ENTRY INFORMATION: SIGNIFICANT CHANGE UP

## 2025-07-10 LAB
CULTURE RESULTS: NO GROWTH — SIGNIFICANT CHANGE UP
SPECIMEN SOURCE: SIGNIFICANT CHANGE UP

## 2025-07-11 DIAGNOSIS — E66.9 OBESITY, UNSPECIFIED: ICD-10-CM

## 2025-07-11 DIAGNOSIS — O09.90 SUPERVISION OF HIGH RISK PREGNANCY, UNSPECIFIED, UNSPECIFIED TRIMESTER: ICD-10-CM

## 2025-07-11 DIAGNOSIS — O99.212 OBESITY COMPLICATING PREGNANCY, SECOND TRIMESTER: ICD-10-CM

## 2025-07-11 DIAGNOSIS — Z3A.16 16 WEEKS GESTATION OF PREGNANCY: ICD-10-CM

## 2025-07-11 DIAGNOSIS — Z36.2 ENCOUNTER FOR OTHER ANTENATAL SCREENING FOLLOW-UP: ICD-10-CM

## 2025-07-11 DIAGNOSIS — O09.299 SUPERVISION OF PREGNANCY WITH OTHER POOR REPRODUCTIVE OR OBSTETRIC HISTORY, UNSPECIFIED TRIMESTER: ICD-10-CM

## 2025-07-11 DIAGNOSIS — O34.40 MATERNAL CARE FOR OTHER ABNORMALITIES OF CERVIX, UNSPECIFIED TRIMESTER: ICD-10-CM

## 2025-07-15 PROCEDURE — 76801 OB US < 14 WKS SINGLE FETUS: CPT

## 2025-07-15 PROCEDURE — 87086 URINE CULTURE/COLONY COUNT: CPT

## 2025-07-15 PROCEDURE — G0463: CPT

## 2025-07-15 PROCEDURE — 76815 OB US LIMITED FETUS(S): CPT

## 2025-07-15 PROCEDURE — T1013: CPT

## 2025-07-15 PROCEDURE — 36415 COLL VENOUS BLD VENIPUNCTURE: CPT

## 2025-07-15 PROCEDURE — 85027 COMPLETE CBC AUTOMATED: CPT

## 2025-07-15 PROCEDURE — 87591 N.GONORRHOEAE DNA AMP PROB: CPT

## 2025-07-15 PROCEDURE — 81025 URINE PREGNANCY TEST: CPT

## 2025-07-15 PROCEDURE — 87624 HPV HI-RISK TYP POOLED RSLT: CPT

## 2025-07-15 PROCEDURE — 86900 BLOOD TYPING SEROLOGIC ABO: CPT

## 2025-07-15 PROCEDURE — 76813 OB US NUCHAL MEAS 1 GEST: CPT

## 2025-07-15 PROCEDURE — 87491 CHLMYD TRACH DNA AMP PROBE: CPT

## 2025-07-15 PROCEDURE — 76817 TRANSVAGINAL US OBSTETRIC: CPT

## 2025-07-15 PROCEDURE — 81003 URINALYSIS AUTO W/O SCOPE: CPT

## 2025-07-15 PROCEDURE — 86850 RBC ANTIBODY SCREEN: CPT

## 2025-07-15 PROCEDURE — 86901 BLOOD TYPING SEROLOGIC RH(D): CPT

## 2025-07-15 PROCEDURE — 59320 REVISION OF CERVIX: CPT

## 2025-07-22 ENCOUNTER — ASOB RESULT (OUTPATIENT)
Age: 40
End: 2025-07-22

## 2025-07-22 ENCOUNTER — APPOINTMENT (OUTPATIENT)
Dept: ANTEPARTUM | Facility: CLINIC | Age: 40
End: 2025-07-22
Payer: MEDICAID

## 2025-07-22 ENCOUNTER — APPOINTMENT (OUTPATIENT)
Dept: MATERNAL FETAL MEDICINE | Facility: CLINIC | Age: 40
End: 2025-07-22

## 2025-07-22 ENCOUNTER — OUTPATIENT (OUTPATIENT)
Dept: OUTPATIENT SERVICES | Facility: HOSPITAL | Age: 40
LOS: 1 days | End: 2025-07-22
Payer: COMMERCIAL

## 2025-07-22 VITALS
DIASTOLIC BLOOD PRESSURE: 66 MMHG | OXYGEN SATURATION: 99 % | BODY MASS INDEX: 35.61 KG/M2 | HEART RATE: 70 BPM | HEIGHT: 62 IN | SYSTOLIC BLOOD PRESSURE: 106 MMHG | WEIGHT: 193.5 LBS

## 2025-07-22 DIAGNOSIS — O34.30 MATERNAL CARE FOR CERVICAL INCOMPETENCE, UNSPECIFIED TRIMESTER: ICD-10-CM

## 2025-07-22 DIAGNOSIS — O09.899 SUPERVISION OF OTHER HIGH RISK PREGNANCIES, UNSPECIFIED TRIMESTER: ICD-10-CM

## 2025-07-22 DIAGNOSIS — Z87.448 PERSONAL HISTORY OF OTHER DISEASES OF URINARY SYSTEM: ICD-10-CM

## 2025-07-22 DIAGNOSIS — Z98.891 HISTORY OF UTERINE SCAR FROM PREVIOUS SURGERY: Chronic | ICD-10-CM

## 2025-07-22 LAB
BILIRUB UR QL STRIP: NORMAL
GLUCOSE UR-MCNC: NORMAL
HCG UR QL: 0.2 EU/DL
HGB UR QL STRIP.AUTO: NORMAL
KETONES UR-MCNC: NORMAL
LEUKOCYTE ESTERASE UR QL STRIP: NORMAL
NITRITE UR QL STRIP: NORMAL
PH UR STRIP: 6
PROT UR STRIP-MCNC: NORMAL
SP GR UR STRIP: 1.02

## 2025-07-22 PROCEDURE — 76815 OB US LIMITED FETUS(S): CPT

## 2025-07-22 PROCEDURE — 76815 OB US LIMITED FETUS(S): CPT | Mod: 26

## 2025-07-22 PROCEDURE — 76817 TRANSVAGINAL US OBSTETRIC: CPT

## 2025-07-22 PROCEDURE — G0463: CPT

## 2025-07-22 PROCEDURE — 99213 OFFICE O/P EST LOW 20 MIN: CPT | Mod: GC,25

## 2025-07-22 PROCEDURE — 76817 TRANSVAGINAL US OBSTETRIC: CPT | Mod: 26

## 2025-07-22 PROCEDURE — 81003 URINALYSIS AUTO W/O SCOPE: CPT

## 2025-07-23 DIAGNOSIS — O09.291 SUPERVISION OF PREGNANCY WITH OTHER POOR REPRODUCTIVE OR OBSTETRIC HISTORY, FIRST TRIMESTER: ICD-10-CM

## 2025-07-23 DIAGNOSIS — Z36.2 ENCOUNTER FOR OTHER ANTENATAL SCREENING FOLLOW-UP: ICD-10-CM

## 2025-07-23 DIAGNOSIS — Z3A.18 18 WEEKS GESTATION OF PREGNANCY: ICD-10-CM

## 2025-07-23 PROBLEM — Z87.448 HISTORY OF PYELONEPHRITIS: Status: ACTIVE | Noted: 2025-07-23

## 2025-07-29 ENCOUNTER — ASOB RESULT (OUTPATIENT)
Age: 40
End: 2025-07-29

## 2025-07-29 ENCOUNTER — APPOINTMENT (OUTPATIENT)
Dept: ANTEPARTUM | Facility: CLINIC | Age: 40
End: 2025-07-29
Payer: MEDICAID

## 2025-07-29 ENCOUNTER — OUTPATIENT (OUTPATIENT)
Dept: OUTPATIENT SERVICES | Facility: HOSPITAL | Age: 40
LOS: 1 days | End: 2025-07-29
Payer: COMMERCIAL

## 2025-07-29 DIAGNOSIS — Z98.891 HISTORY OF UTERINE SCAR FROM PREVIOUS SURGERY: Chronic | ICD-10-CM

## 2025-07-29 LAB
BILIRUB UR QL STRIP: NORMAL
GLUCOSE UR-MCNC: NORMAL
HCG UR QL: 0.2 EU/DL
HGB UR QL STRIP.AUTO: NORMAL
KETONES UR-MCNC: NORMAL
LEUKOCYTE ESTERASE UR QL STRIP: NORMAL
NITRITE UR QL STRIP: NORMAL
PH UR STRIP: 7
PROT UR STRIP-MCNC: NORMAL
SP GR UR STRIP: 1.01

## 2025-07-29 PROCEDURE — 76815 OB US LIMITED FETUS(S): CPT | Mod: 26

## 2025-07-29 PROCEDURE — 76817 TRANSVAGINAL US OBSTETRIC: CPT | Mod: 26

## 2025-07-29 PROCEDURE — 76817 TRANSVAGINAL US OBSTETRIC: CPT

## 2025-07-29 PROCEDURE — 76815 OB US LIMITED FETUS(S): CPT

## 2025-07-29 RX ORDER — PROGESTERONE 200 MG/1
200 CAPSULE ORAL
Qty: 30 | Refills: 4 | Status: ACTIVE | COMMUNITY
Start: 2025-07-29 | End: 1900-01-01

## 2025-08-01 DIAGNOSIS — Z3A.18 18 WEEKS GESTATION OF PREGNANCY: ICD-10-CM

## 2025-08-01 DIAGNOSIS — O34.30 MATERNAL CARE FOR CERVICAL INCOMPETENCE, UNSPECIFIED TRIMESTER: ICD-10-CM

## 2025-08-01 DIAGNOSIS — O09.291 SUPERVISION OF PREGNANCY WITH OTHER POOR REPRODUCTIVE OR OBSTETRIC HISTORY, FIRST TRIMESTER: ICD-10-CM

## 2025-08-01 DIAGNOSIS — Z36.2 ENCOUNTER FOR OTHER ANTENATAL SCREENING FOLLOW-UP: ICD-10-CM

## 2025-08-05 ENCOUNTER — NON-APPOINTMENT (OUTPATIENT)
Age: 40
End: 2025-08-05

## 2025-08-05 ENCOUNTER — OUTPATIENT (OUTPATIENT)
Dept: OUTPATIENT SERVICES | Facility: HOSPITAL | Age: 40
LOS: 1 days | End: 2025-08-05
Payer: COMMERCIAL

## 2025-08-05 ENCOUNTER — APPOINTMENT (OUTPATIENT)
Dept: ANTEPARTUM | Facility: CLINIC | Age: 40
End: 2025-08-05
Payer: MEDICAID

## 2025-08-05 ENCOUNTER — ASOB RESULT (OUTPATIENT)
Age: 40
End: 2025-08-05

## 2025-08-05 ENCOUNTER — APPOINTMENT (OUTPATIENT)
Dept: MATERNAL FETAL MEDICINE | Facility: CLINIC | Age: 40
End: 2025-08-05
Payer: MEDICAID

## 2025-08-05 VITALS
WEIGHT: 195 LBS | BODY MASS INDEX: 35.88 KG/M2 | HEIGHT: 62 IN | HEART RATE: 66 BPM | SYSTOLIC BLOOD PRESSURE: 100 MMHG | DIASTOLIC BLOOD PRESSURE: 50 MMHG | OXYGEN SATURATION: 99 %

## 2025-08-05 DIAGNOSIS — O09.899 SUPERVISION OF OTHER HIGH RISK PREGNANCIES, UNSPECIFIED TRIMESTER: ICD-10-CM

## 2025-08-05 DIAGNOSIS — O09.529 SUPERVISION OF ELDERLY MULTIGRAVIDA, UNSPECIFIED TRIMESTER: ICD-10-CM

## 2025-08-05 DIAGNOSIS — O09.299 SUPERVISION OF PREGNANCY WITH OTHER POOR REPRODUCTIVE OR OBSTETRIC HISTORY, UNSPECIFIED TRIMESTER: ICD-10-CM

## 2025-08-05 DIAGNOSIS — O09.90 SUPERVISION OF HIGH RISK PREGNANCY, UNSPECIFIED, UNSPECIFIED TRIMESTER: ICD-10-CM

## 2025-08-05 DIAGNOSIS — O34.30 MATERNAL CARE FOR CERVICAL INCOMPETENCE, UNSPECIFIED TRIMESTER: ICD-10-CM

## 2025-08-05 LAB
BACTERIA UR CULT: NORMAL
BILIRUB UR QL STRIP: NORMAL
GLUCOSE UR-MCNC: NORMAL
HCG UR QL: 0.2 EU/DL
HGB UR QL STRIP.AUTO: NORMAL
KETONES UR-MCNC: NORMAL
LEUKOCYTE ESTERASE UR QL STRIP: NORMAL
NITRITE UR QL STRIP: NORMAL
PH UR STRIP: 7
PROT UR STRIP-MCNC: 30
SP GR UR STRIP: 1.02

## 2025-08-05 PROCEDURE — 76817 TRANSVAGINAL US OBSTETRIC: CPT

## 2025-08-05 PROCEDURE — 99213 OFFICE O/P EST LOW 20 MIN: CPT | Mod: GC,25

## 2025-08-05 PROCEDURE — 76817 TRANSVAGINAL US OBSTETRIC: CPT | Mod: 26

## 2025-08-05 PROCEDURE — 81003 URINALYSIS AUTO W/O SCOPE: CPT

## 2025-08-05 PROCEDURE — G0463: CPT

## 2025-08-05 PROCEDURE — 76811 OB US DETAILED SNGL FETUS: CPT | Mod: 26

## 2025-08-05 PROCEDURE — 76811 OB US DETAILED SNGL FETUS: CPT

## 2025-08-06 DIAGNOSIS — O99.212 OBESITY COMPLICATING PREGNANCY, SECOND TRIMESTER: ICD-10-CM

## 2025-08-06 DIAGNOSIS — Z3A.20 20 WEEKS GESTATION OF PREGNANCY: ICD-10-CM

## 2025-08-06 DIAGNOSIS — O09.291 SUPERVISION OF PREGNANCY WITH OTHER POOR REPRODUCTIVE OR OBSTETRIC HISTORY, FIRST TRIMESTER: ICD-10-CM

## 2025-08-06 DIAGNOSIS — O35.8XX0 MATERNAL CARE FOR OTHER (SUSPECTED) FETAL ABNORMALITY AND DAMAGE, NOT APPLICABLE OR UNSPECIFIED: ICD-10-CM

## 2025-08-06 DIAGNOSIS — O09.522 SUPERVISION OF ELDERLY MULTIGRAVIDA, SECOND TRIMESTER: ICD-10-CM

## 2025-08-19 ENCOUNTER — APPOINTMENT (OUTPATIENT)
Dept: MATERNAL FETAL MEDICINE | Facility: CLINIC | Age: 40
End: 2025-08-19
Payer: MEDICAID

## 2025-08-19 ENCOUNTER — ASOB RESULT (OUTPATIENT)
Age: 40
End: 2025-08-19

## 2025-08-19 ENCOUNTER — APPOINTMENT (OUTPATIENT)
Dept: ANTEPARTUM | Facility: CLINIC | Age: 40
End: 2025-08-19
Payer: MEDICAID

## 2025-08-19 ENCOUNTER — OUTPATIENT (OUTPATIENT)
Dept: OUTPATIENT SERVICES | Facility: HOSPITAL | Age: 40
LOS: 1 days | End: 2025-08-19
Payer: COMMERCIAL

## 2025-08-19 VITALS
HEART RATE: 72 BPM | SYSTOLIC BLOOD PRESSURE: 104 MMHG | HEIGHT: 62 IN | BODY MASS INDEX: 36.3 KG/M2 | DIASTOLIC BLOOD PRESSURE: 57 MMHG | WEIGHT: 197.25 LBS | OXYGEN SATURATION: 98 %

## 2025-08-19 DIAGNOSIS — O09.90 SUPERVISION OF HIGH RISK PREGNANCY, UNSPECIFIED, UNSPECIFIED TRIMESTER: ICD-10-CM

## 2025-08-19 DIAGNOSIS — O09.529 SUPERVISION OF ELDERLY MULTIGRAVIDA, UNSPECIFIED TRIMESTER: ICD-10-CM

## 2025-08-19 DIAGNOSIS — O09.299 SUPERVISION OF PREGNANCY WITH OTHER POOR REPRODUCTIVE OR OBSTETRIC HISTORY, UNSPECIFIED TRIMESTER: ICD-10-CM

## 2025-08-19 DIAGNOSIS — O09.292 SUPERVISION OF PREGNANCY WITH OTHER POOR REPRODUCTIVE OR OBSTETRIC HISTORY, SECOND TRIMESTER: ICD-10-CM

## 2025-08-19 DIAGNOSIS — O34.30 MATERNAL CARE FOR CERVICAL INCOMPETENCE, UNSPECIFIED TRIMESTER: ICD-10-CM

## 2025-08-19 DIAGNOSIS — Z98.891 HISTORY OF UTERINE SCAR FROM PREVIOUS SURGERY: Chronic | ICD-10-CM

## 2025-08-19 DIAGNOSIS — O09.899 SUPERVISION OF OTHER HIGH RISK PREGNANCIES, UNSPECIFIED TRIMESTER: ICD-10-CM

## 2025-08-19 LAB
BILIRUB UR QL STRIP: NORMAL
GLUCOSE UR-MCNC: NORMAL
HCG UR QL: 0.2 EU/DL
HGB UR QL STRIP.AUTO: NORMAL
KETONES UR-MCNC: NORMAL
LEUKOCYTE ESTERASE UR QL STRIP: NORMAL
NITRITE UR QL STRIP: NORMAL
PH UR STRIP: 6.5
PROT UR STRIP-MCNC: NORMAL
SP GR UR STRIP: 1.01

## 2025-08-19 PROCEDURE — G0463: CPT

## 2025-08-19 PROCEDURE — 76817 TRANSVAGINAL US OBSTETRIC: CPT | Mod: 26

## 2025-08-19 PROCEDURE — 99213 OFFICE O/P EST LOW 20 MIN: CPT | Mod: GC,25

## 2025-08-19 PROCEDURE — 76816 OB US FOLLOW-UP PER FETUS: CPT | Mod: 26

## 2025-08-19 PROCEDURE — 81003 URINALYSIS AUTO W/O SCOPE: CPT

## 2025-08-21 DIAGNOSIS — O09.291 SUPERVISION OF PREGNANCY WITH OTHER POOR REPRODUCTIVE OR OBSTETRIC HISTORY, FIRST TRIMESTER: ICD-10-CM

## 2025-08-21 DIAGNOSIS — O99.212 OBESITY COMPLICATING PREGNANCY, SECOND TRIMESTER: ICD-10-CM

## 2025-08-21 DIAGNOSIS — Z3A.22 22 WEEKS GESTATION OF PREGNANCY: ICD-10-CM

## 2025-08-21 DIAGNOSIS — Z36.3 ENCOUNTER FOR ANTENATAL SCREENING FOR MALFORMATIONS: ICD-10-CM

## 2025-08-25 DIAGNOSIS — O09.529 SUPERVISION OF ELDERLY MULTIGRAVIDA, UNSPECIFIED TRIMESTER: ICD-10-CM

## 2025-08-25 DIAGNOSIS — O09.299 SUPERVISION OF PREGNANCY WITH OTHER POOR REPRODUCTIVE OR OBSTETRIC HISTORY, UNSPECIFIED TRIMESTER: ICD-10-CM

## 2025-08-25 DIAGNOSIS — O34.90 MATERNAL CARE FOR ABNORMALITY OF PELVIC ORGAN, UNSPECIFIED, UNSPECIFIED TRIMESTER: ICD-10-CM

## 2025-08-25 DIAGNOSIS — O09.90 SUPERVISION OF HIGH RISK PREGNANCY, UNSPECIFIED, UNSPECIFIED TRIMESTER: ICD-10-CM

## 2025-09-09 ENCOUNTER — LABORATORY RESULT (OUTPATIENT)
Age: 40
End: 2025-09-09

## 2025-09-09 ENCOUNTER — APPOINTMENT (OUTPATIENT)
Dept: MATERNAL FETAL MEDICINE | Facility: CLINIC | Age: 40
End: 2025-09-09

## 2025-09-09 VITALS
OXYGEN SATURATION: 98 % | BODY MASS INDEX: 36.25 KG/M2 | HEIGHT: 62 IN | WEIGHT: 197 LBS | HEART RATE: 73 BPM | SYSTOLIC BLOOD PRESSURE: 90 MMHG | DIASTOLIC BLOOD PRESSURE: 60 MMHG
